# Patient Record
Sex: FEMALE | Race: WHITE | NOT HISPANIC OR LATINO | Employment: OTHER | URBAN - NONMETROPOLITAN AREA
[De-identification: names, ages, dates, MRNs, and addresses within clinical notes are randomized per-mention and may not be internally consistent; named-entity substitution may affect disease eponyms.]

---

## 2017-01-02 ENCOUNTER — HOSPITAL ENCOUNTER (OUTPATIENT)
Dept: PHYSICAL THERAPY | Facility: HOSPITAL | Age: 62
Setting detail: THERAPIES SERIES
Discharge: HOME | End: 2017-01-02

## 2017-01-02 DIAGNOSIS — I97.2 POSTMASTECTOMY LYMPHEDEMA SYNDROME: Primary | ICD-10-CM

## 2017-01-02 PROCEDURE — 97140 MANUAL THERAPY 1/> REGIONS: CPT

## 2017-01-02 NOTE — PROGRESS NOTES
Outpatient Physical Therapy Lymphedema Treatment Note  [facility]     Patient Name: Demi Morataya  : 1955  MRN: [id number]  Today's Date: 2017        Visit Date: 2017    Visit Dx:    ICD-10-CM ICD-9-CM   1. 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             Lymphedema       17 1045          Subjective Comments    Subjective Comments She has her sleeve and it fits well.  She has the pump, and someone is coming today to show her how to use it.  She still feels like she has the swelling that accumulates in the Left uppre arm where the sleeve ends as well as the left chest.   -AL      Subjective Pain    Able to rate subjective pain? yes  -AL      Pre-Treatment Pain Level 0  -AL      Post-Treatment Pain Level 0  -AL      Manual Lymphatic Drainage    Manual Lymphatic Drainage initial sequence;opened regional lymph nodes;opened anastamoses;extremity treatment  -AL      Initial Sequence short neck;abdomen;diaphragmatic breathing  -AL      Supraclavicular left;right  -AL      Abdomen superficial;deep  -AL      Diaphragmatic Breathing x 9 with deep abdominals  -AL      Opened Regional Lymph Nodes axillary;inguinal  -AL      Inguinal left  -AL      Opened Anastamoses axillo-inguinal  -AL      Axillo-Inguinal left  -AL      Axillo-Inguinal Comment Spent extra time on the Left chest area and abdomen   -AL      Extremity Treatment MLD to full limb;extremity treatment focus on   left arm and trunk  -AL      MLD to Full Limb Supine and R sidelying  -AL      Compression/Skin Care    Compression/Skin Care compression garment  -AL      Compression Garment Comments assisted with donning L compression sleeve  -AL        User Key  (r) = Recorded By, (t) = Taken By, (c) = Cosigned By    Initials Name Provider Type    AL Divina Robertson PTA Physical Therapy Assistant                              PT Assessment/Plan       17 1045          PT  Assessment    Functional Limitations Decreased safety during functional activities;Performance in leisure activities;Limitation in home management;Limitations in functional capacity and performance  -AL      Impairments Edema;Impaired lymphatic circulation  -AL      Assessment Comments Patient continues to be compliant with complete decongestive therapy.  Her new copmression sleeve fits nicely, and is not pinching anywhere.  She is going to be trained on the home pump today.  -AL      PT Plan    PT Plan Comments Will plan on seeing patient one more visit to make sure the pump is working for her, as well as her copression sleeve is still containing the swelling in the left arm.   -AL        User Key  (r) = Recorded By, (t) = Taken By, (c) = Cosigned By    Initials Name Provider Type    LAWRENCE Robertson PTA Physical Therapy Assistant                     Exercises       01/02/17 1045          Subjective Comments    Subjective Comments She has her sleeve and it fits well.  She has the pump, and someone is coming today to show her how to use it.  She still feels like she has the swelling that accumulates in the Left uppre arm where the sleeve ends as well as the left chest.   -AL      Subjective Pain    Able to rate subjective pain? yes  -AL      Pre-Treatment Pain Level 0  -AL      Post-Treatment Pain Level 0  -AL        User Key  (r) = Recorded By, (t) = Taken By, (c) = Cosigned By    Initials Name Provider Type    LAWRENCE Robertson PTA Physical Therapy Assistant                              PT OP Goals       01/02/17 1045 12/20/16 0900       PT Short Term Goals    STG Date to Achieve 09/13/16  -AL      STG 1 Patient demonstrate proper awareness of What is Lymphedema for improved management, care of symptoms and self-care of condition.   -AL      STG 1 Progress Met  -AL      STG 2 Patient demonstrate decreased net edema of upper extremity >/= 10% for decreased risk of infection.   -AL      STG 2 Progress Ongoing  -AL       STG 2 Progress Comments She is working on wrapping and wearing her new compression sleeve  -AL      STG 3 Patient independent and compliant with self-massage techniques with spouse as needed for improved lymphatic drainage.  -AL      STG 3 Progress Met   also has flexitouch pump  -AL      STG 4 Patient independent and compliant with initial home exercise program focused on deep breathing, range of motion for decreased edema and decreased risk of infection.   -AL      STG 4 Progress Met  -AL      STG 5 Patient independent and compliant with self-wrapping techniques of compression bandages with spouse for self-management of condition.   -AL      STG 5 Progress Met  -AL      Long Term Goals    LTG Date to Achieve 01/19/17  -AL 01/19/17  -HR     LTG 1 Patient independent and compliant with compression garments as indicated for self-management of condition.   -AL Patient independent and compliant with compression garments as indicated for self-management of condition.   -HR     LTG 1 Progress Progressing  -AL Progressing  -HR     LTG 1 Progress Comments The sleeve fits well now, she has not worn the gaunlet much, but she did put it on today.  -AL The sleeve did not fit so had to be re-ordered. She is supposed to pick it up today.   -HR     LTG 2 Patient will receive and be trained on home lymphedema pump as basic sequential pump is not keeping the arm or trunk edema controlled.   -AL Patient will receive and be trained on home lymphedema pump as basic sequential pump is not keeping the arm or trunk edema controlled.   -HR     LTG 2 Progress Ongoing  -AL New  -HR     LTG 2 Progress Comments She is going to be trained on the pump today.  -AL      Time Calculation    PT Goal Re-Cert Due Date 01/19/17  -AL 01/19/17  -HR       User Key  (r) = Recorded By, (t) = Taken By, (c) = Cosigned By    Initials Name Provider Type    LAWRENCE Robertson, PTA Physical Therapy Assistant    HR Kerline Good, PT Physical Therapist                 Therapy Education       01/02/17 1045    Therapy Education    Given Edema management  -AL    Program Reinforced  -AL    How Provided Verbal  -AL    Provided to Patient  -AL    Level of Understanding Verbalized  -AL      User Key  (r) = Recorded By, (t) = Taken By, (c) = Cosigned By    Initials Name Provider Type    LAWRENCE Robertson PTA Physical Therapy Assistant                Time Calculation:   Start Time: 1045  Stop Time: 1157  Time Calculation (min): 72 min  Total Timed Code Minutes- PT: 72 minute(s)     Therapy Charges for Today     Code Description Service Date Service Provider Modifiers Qty    54953655232 HC PT MANUAL THERAPY EA 15 MIN 1/2/2017 Divina Robertson PTA GP 5                    Divina Robertson PTA  1/2/2017

## 2017-01-17 ENCOUNTER — APPOINTMENT (OUTPATIENT)
Dept: PHYSICAL THERAPY | Facility: HOSPITAL | Age: 62
End: 2017-01-17

## 2017-01-18 ENCOUNTER — HOSPITAL ENCOUNTER (OUTPATIENT)
Dept: PHYSICAL THERAPY | Facility: HOSPITAL | Age: 62
Setting detail: THERAPIES SERIES
Discharge: HOME | End: 2017-01-18

## 2017-01-18 DIAGNOSIS — I97.2 POSTMASTECTOMY LYMPHEDEMA SYNDROME: Primary | ICD-10-CM

## 2017-01-18 PROCEDURE — 97140 MANUAL THERAPY 1/> REGIONS: CPT

## 2017-01-18 NOTE — PROGRESS NOTES
Outpatient Physical Therapy Lymphedema Treatment Note  [facility]     Patient Name: Demi Morataya  : 1955  MRN: [id number]  Today's Date: 2017        Visit Date: 2017    Visit Dx:    ICD-10-CM ICD-9-CM   1. 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             Lymphedema       17 0815          Subjective Comments    Subjective Comments She is using the Flexitouch pump every other day for an hour.  She feels like her arm is maintaining in size.  She is having pain under both breast and she is not sure why.  No pain right now.    -AL      Subjective Pain    Able to rate subjective pain? yes  -AL      Pre-Treatment Pain Level 0  -AL      Post-Treatment Pain Level 0  -AL      Lymphedema Measurements    Quick Girth Areas Upper extremities   abdomen 104.8; chest at axillae 114.4;nipple line 122.0  -AL      LUE Volumetric (cm)    Reference Point 0 17.4 distal wrist crease   Web space 18.6  -AL      RUE Volumetric (cm)    Reference Point 0 0  -AL      LUE Volumetric (cm)    5 25 cm  -AL      10 29.5 cm  -AL      15 32.4 cm  -AL      20 34 cm  -AL      25 34.4 cm   elbow  -AL      30 39.1 cm  -AL      35 41 cm  -AL      40 43 cm   axilla  -AL      Manual Lymphatic Drainage    Manual Lymphatic Drainage initial sequence;opened regional lymph nodes;opened anastamoses;extremity treatment  -AL      Initial Sequence short neck;abdomen;diaphragmatic breathing  -AL      Supraclavicular left;right  -AL      Abdomen superficial;deep  -AL      Diaphragmatic Breathing x 9 with deep abdominals  -AL      Opened Regional Lymph Nodes axillary;inguinal  -AL      Inguinal left  -AL      Opened Anastamoses axillo-inguinal  -AL      Axillo-Inguinal left  -AL      Axillo-Inguinal Comment Spent extra time on the Left chest area and abdomen   -AL      Extremity Treatment MLD to full limb;extremity treatment focus on   left arm and trunk  -AL      MLD to  Full Limb Supine and R sidelying  -AL      Compression/Skin Care    Compression/Skin Care compression garment  -AL      Compression Garment Comments assisted with donning L compression sleeve  -AL        User Key  (r) = Recorded By, (t) = Taken By, (c) = Cosigned By    Initials Name Provider Type    LAWRENCE Robertson PTA Physical Therapy Assistant                              PT Assessment/Plan       01/18/17 0815          PT Assessment    Functional Limitations Decreased safety during functional activities;Performance in leisure activities;Limitation in home management;Limitations in functional capacity and performance  -AL      Impairments Edema;Impaired lymphatic circulation  -AL      Assessment Comments Patient is compliant with her complete decongestive therapy.  She has some questions about the Flexitouch, she is going to get in touch with the company.   -AL      PT Plan    PT Plan Comments Will plan to place patient on hold for the next 3 weeks, if we do not hear from this patient will d/c this patient.   -AL        User Key  (r) = Recorded By, (t) = Taken By, (c) = Cosigned By    Initials Name Provider Type    LAWRENCE Robertson PTA Physical Therapy Assistant                     Exercises       01/18/17 0815          Subjective Comments    Subjective Comments She is using the Flexitouch pump every other day for an hour.  She feels like her arm is maintaining in size.  She is having pain under both breast and she is not sure why.  No pain right now.    -AL      Subjective Pain    Able to rate subjective pain? yes  -AL      Pre-Treatment Pain Level 0  -AL      Post-Treatment Pain Level 0  -AL        User Key  (r) = Recorded By, (t) = Taken By, (c) = Cosigned By    Initials Name Provider Type    LAWRENCE Robertson PTA Physical Therapy Assistant                              PT OP Goals       01/18/17 0815          PT Short Term Goals    STG Date to Achieve 09/13/16  -AL      STG 1 Patient demonstrate proper  awareness of What is Lymphedema for improved management, care of symptoms and self-care of condition.   -AL      STG 1 Progress Met  -AL      STG 2 Patient demonstrate decreased net edema of upper extremity >/= 10% for decreased risk of infection.   -AL      STG 2 Progress Ongoing  -AL      STG 2 Progress Comments Fluctuates  -AL      STG 3 Patient independent and compliant with self-massage techniques with spouse as needed for improved lymphatic drainage.  -AL      STG 3 Progress Met   also has flexitouch pump  -AL      STG 4 Patient independent and compliant with initial home exercise program focused on deep breathing, range of motion for decreased edema and decreased risk of infection.   -AL      STG 4 Progress Met  -AL      STG 5 Patient independent and compliant with self-wrapping techniques of compression bandages with spouse for self-management of condition.   -AL      STG 5 Progress Met  -AL      Long Term Goals    LTG Date to Achieve 01/19/17  -AL      LTG 1 Patient independent and compliant with compression garments as indicated for self-management of condition.   -AL      LTG 1 Progress Met  -AL      LTG 2 Patient will receive and be trained on home lymphedema pump as basic sequential pump is not keeping the arm or trunk edema controlled.   -AL      LTG 2 Progress Partially Met  -AL      LTG 2 Progress Comments She is not sure if the pump should give her more pressure in the chest, she is going to call [employer]  -AL      Time Calculation    PT Goal Re-Cert Due Date 01/19/17  -AL        User Key  (r) = Recorded By, (t) = Taken By, (c) = Cosigned By    Initials Name Provider Type    LAWRENCE Robertson PTA Physical Therapy Assistant                Therapy Education       01/18/17 0891    Therapy Education    Given Edema management  -AL    Program Reinforced  -AL    How Provided Verbal  -AL    Provided to Patient  -AL    Level of Understanding Verbalized  -AL      User Key  (r) = Recorded By, (t) =  Taken By, (c) = Cosigned By    Initials Name Provider Type    AL Divina Robertson PTA Physical Therapy Assistant                Time Calculation:   Start Time: 0815  Stop Time: 0930  Time Calculation (min): 75 min  Total Timed Code Minutes- PT: 75 minute(s)     Therapy Charges for Today     Code Description Service Date Service Provider Modifiers Qty    41216460572 HC PT MANUAL THERAPY EA 15 MIN 1/18/2017 Divina Robertson PTA GP 5                    Divina Robertson PTA  1/18/2017

## 2017-01-23 ENCOUNTER — APPOINTMENT (OUTPATIENT)
Dept: PHYSICAL THERAPY | Facility: HOSPITAL | Age: 62
End: 2017-01-23

## 2017-03-20 ENCOUNTER — OFFICE VISIT (OUTPATIENT)
Dept: OBGYN | Age: 62
End: 2017-03-20
Payer: MEDICARE

## 2017-03-20 VITALS
HEIGHT: 64 IN | WEIGHT: 239 LBS | SYSTOLIC BLOOD PRESSURE: 131 MMHG | BODY MASS INDEX: 40.8 KG/M2 | DIASTOLIC BLOOD PRESSURE: 81 MMHG | HEART RATE: 71 BPM

## 2017-03-20 DIAGNOSIS — Z01.419 WELL WOMAN EXAM WITH ROUTINE GYNECOLOGICAL EXAM: Primary | ICD-10-CM

## 2017-03-20 DIAGNOSIS — Z12.72 SCREENING FOR VAGINAL CANCER: ICD-10-CM

## 2017-03-20 PROCEDURE — G0101 CA SCREEN;PELVIC/BREAST EXAM: HCPCS | Performed by: NURSE PRACTITIONER

## 2017-03-20 RX ORDER — ACETAMINOPHEN 160 MG
TABLET,DISINTEGRATING ORAL
COMMUNITY

## 2017-03-20 RX ORDER — LEVOTHYROXINE SODIUM 0.05 MG/1
50 TABLET ORAL DAILY
COMMUNITY
End: 2019-04-02 | Stop reason: CLARIF

## 2017-03-20 RX ORDER — EZETIMIBE 10 MG/1
10 TABLET ORAL DAILY
COMMUNITY

## 2017-03-20 RX ORDER — OMEPRAZOLE 20 MG/1
20 CAPSULE, DELAYED RELEASE ORAL DAILY
COMMUNITY
End: 2018-04-26 | Stop reason: ALTCHOICE

## 2017-03-20 RX ORDER — MAGNESIUM GLUCONATE 27 MG(500)
500 TABLET ORAL 2 TIMES DAILY
COMMUNITY
End: 2019-04-02

## 2017-03-20 ASSESSMENT — ENCOUNTER SYMPTOMS
EYES NEGATIVE: 1
GASTROINTESTINAL NEGATIVE: 1
RESPIRATORY NEGATIVE: 1

## 2017-03-28 ENCOUNTER — TELEPHONE (OUTPATIENT)
Dept: GASTROENTEROLOGY | Age: 62
End: 2017-03-28

## 2017-03-30 ENCOUNTER — TELEPHONE (OUTPATIENT)
Dept: GASTROENTEROLOGY | Age: 62
End: 2017-03-30

## 2017-04-04 ENCOUNTER — TELEPHONE (OUTPATIENT)
Dept: GASTROENTEROLOGY | Age: 62
End: 2017-04-04

## 2017-04-20 ENCOUNTER — ANESTHESIA EVENT (OUTPATIENT)
Dept: OPERATING ROOM | Age: 62
End: 2017-04-20

## 2017-04-26 ENCOUNTER — HOSPITAL ENCOUNTER (OUTPATIENT)
Age: 62
Setting detail: OUTPATIENT SURGERY
Discharge: HOME | End: 2017-04-26
Attending: INTERNAL MEDICINE | Admitting: INTERNAL MEDICINE
Payer: MEDICARE

## 2017-04-26 ENCOUNTER — ANESTHESIA (OUTPATIENT)
Dept: OPERATING ROOM | Age: 62
End: 2017-04-26
Payer: MEDICARE

## 2017-04-26 VITALS
DIASTOLIC BLOOD PRESSURE: 74 MMHG | HEART RATE: 57 BPM | WEIGHT: 215 LBS | TEMPERATURE: 98.2 F | OXYGEN SATURATION: 98 % | HEIGHT: 64 IN | RESPIRATION RATE: 18 BRPM | BODY MASS INDEX: 36.7 KG/M2 | SYSTOLIC BLOOD PRESSURE: 116 MMHG

## 2017-04-26 VITALS — DIASTOLIC BLOOD PRESSURE: 57 MMHG | OXYGEN SATURATION: 97 % | SYSTOLIC BLOOD PRESSURE: 110 MMHG

## 2017-04-26 PROBLEM — Z12.11 SCREENING FOR COLON CANCER: Status: ACTIVE | Noted: 2017-04-26

## 2017-04-26 PROCEDURE — 00810 PR ANESTH,INTESTINE,SCOPE,LOW: CPT | Performed by: NURSE ANESTHETIST, CERTIFIED REGISTERED

## 2017-04-26 PROCEDURE — G8907 PT DOC NO EVENTS ON DISCHARG: HCPCS

## 2017-04-26 PROCEDURE — G8918 PT W/O PREOP ORDER IV AB PRO: HCPCS

## 2017-04-26 PROCEDURE — G0121 COLON CA SCRN NOT HI RSK IND: HCPCS

## 2017-04-26 PROCEDURE — G0121 COLON CA SCRN NOT HI RSK IND: HCPCS | Performed by: INTERNAL MEDICINE

## 2017-04-26 RX ORDER — PROMETHAZINE HYDROCHLORIDE 25 MG/ML
12.5 INJECTION, SOLUTION INTRAMUSCULAR; INTRAVENOUS
Status: DISCONTINUED | OUTPATIENT
Start: 2017-04-26 | End: 2017-04-26 | Stop reason: HOSPADM

## 2017-04-26 RX ORDER — MEPERIDINE HYDROCHLORIDE 25 MG/ML
12.5 INJECTION INTRAMUSCULAR; INTRAVENOUS; SUBCUTANEOUS EVERY 5 MIN PRN
Status: DISCONTINUED | OUTPATIENT
Start: 2017-04-26 | End: 2017-04-26 | Stop reason: HOSPADM

## 2017-04-26 RX ORDER — HYDRALAZINE HYDROCHLORIDE 20 MG/ML
5 INJECTION INTRAMUSCULAR; INTRAVENOUS EVERY 10 MIN PRN
Status: DISCONTINUED | OUTPATIENT
Start: 2017-04-26 | End: 2017-04-26 | Stop reason: HOSPADM

## 2017-04-26 RX ORDER — SODIUM CHLORIDE, SODIUM LACTATE, POTASSIUM CHLORIDE, CALCIUM CHLORIDE 600; 310; 30; 20 MG/100ML; MG/100ML; MG/100ML; MG/100ML
INJECTION, SOLUTION INTRAVENOUS CONTINUOUS
Status: DISCONTINUED | OUTPATIENT
Start: 2017-04-26 | End: 2017-04-26 | Stop reason: HOSPADM

## 2017-04-26 RX ORDER — LABETALOL HYDROCHLORIDE 5 MG/ML
5 INJECTION, SOLUTION INTRAVENOUS EVERY 10 MIN PRN
Status: DISCONTINUED | OUTPATIENT
Start: 2017-04-26 | End: 2017-04-26 | Stop reason: HOSPADM

## 2017-04-26 RX ORDER — PROPOFOL 10 MG/ML
INJECTION, EMULSION INTRAVENOUS PRN
Status: DISCONTINUED | OUTPATIENT
Start: 2017-04-26 | End: 2017-04-26 | Stop reason: SDUPTHER

## 2017-04-26 RX ORDER — ONDANSETRON 2 MG/ML
4 INJECTION INTRAMUSCULAR; INTRAVENOUS
Status: DISCONTINUED | OUTPATIENT
Start: 2017-04-26 | End: 2017-04-26 | Stop reason: HOSPADM

## 2017-04-26 RX ORDER — LIDOCAINE HYDROCHLORIDE 10 MG/ML
1 INJECTION, SOLUTION EPIDURAL; INFILTRATION; INTRACAUDAL; PERINEURAL
Status: DISCONTINUED | OUTPATIENT
Start: 2017-04-26 | End: 2017-04-26 | Stop reason: HOSPADM

## 2017-04-26 RX ORDER — SODIUM CHLORIDE 9 MG/ML
INJECTION, SOLUTION INTRAVENOUS CONTINUOUS
Status: DISCONTINUED | OUTPATIENT
Start: 2017-04-26 | End: 2017-04-26 | Stop reason: HOSPADM

## 2017-04-26 RX ORDER — DIPHENHYDRAMINE HYDROCHLORIDE 50 MG/ML
12.5 INJECTION INTRAMUSCULAR; INTRAVENOUS
Status: DISCONTINUED | OUTPATIENT
Start: 2017-04-26 | End: 2017-04-26 | Stop reason: HOSPADM

## 2017-04-26 RX ADMIN — SODIUM CHLORIDE, SODIUM LACTATE, POTASSIUM CHLORIDE, CALCIUM CHLORIDE: 600; 310; 30; 20 INJECTION, SOLUTION INTRAVENOUS at 07:45

## 2017-04-26 RX ADMIN — PROPOFOL 200 MG: 10 INJECTION, EMULSION INTRAVENOUS at 08:35

## 2017-04-26 RX ADMIN — SODIUM CHLORIDE: 9 INJECTION, SOLUTION INTRAVENOUS at 07:45

## 2017-05-17 ENCOUNTER — DOCUMENTATION (OUTPATIENT)
Dept: PHYSICAL THERAPY | Facility: HOSPITAL | Age: 62
End: 2017-05-17

## 2017-05-17 DIAGNOSIS — I97.2 POSTMASTECTOMY LYMPHEDEMA SYNDROME: Primary | ICD-10-CM

## 2017-06-22 ENCOUNTER — TELEPHONE (OUTPATIENT)
Dept: UROLOGY | Facility: CLINIC | Age: 62
End: 2017-06-22

## 2017-06-22 DIAGNOSIS — N20.0 KIDNEY STONE ON LEFT SIDE: Primary | ICD-10-CM

## 2017-07-13 ENCOUNTER — APPOINTMENT (OUTPATIENT)
Dept: ULTRASOUND IMAGING | Facility: HOSPITAL | Age: 62
End: 2017-07-13

## 2017-07-13 ENCOUNTER — HOSPITAL ENCOUNTER (EMERGENCY)
Facility: HOSPITAL | Age: 62
Discharge: HOME | End: 2017-07-14
Attending: EMERGENCY MEDICINE | Admitting: EMERGENCY MEDICINE

## 2017-07-13 DIAGNOSIS — R10.9 ABDOMINAL PAIN, UNSPECIFIED LOCATION: Primary | ICD-10-CM

## 2017-07-13 LAB
ALBUMIN SERPL-MCNC: 4.3 G/DL (ref 3.5–5)
ALBUMIN/GLOB SERPL: 1.5 G/DL (ref 1.1–2.5)
ALP SERPL-CCNC: 73 U/L (ref 24–120)
ALT SERPL W P-5'-P-CCNC: 43 U/L (ref 0–54)
ANION GAP SERPL CALCULATED.3IONS-SCNC: 9 MMOL/L (ref 4–13)
AST SERPL-CCNC: 30 U/L (ref 7–45)
BASOPHILS # BLD AUTO: 0.03 10*3/MM3 (ref 0–0.2)
BASOPHILS NFR BLD AUTO: 0.4 % (ref 0–2)
BILIRUB SERPL-MCNC: 1.9 MG/DL (ref 0.1–1)
BUN BLD-MCNC: 18 MG/DL (ref 5–21)
BUN/CREAT SERPL: 23.1 (ref 7–25)
CALCIUM SPEC-SCNC: 9.2 MG/DL (ref 8.4–10.4)
CHLORIDE SERPL-SCNC: 103 MMOL/L (ref 98–110)
CO2 SERPL-SCNC: 27 MMOL/L (ref 24–31)
CREAT BLD-MCNC: 0.78 MG/DL (ref 0.5–1.4)
DEPRECATED RDW RBC AUTO: 40.7 FL (ref 40–54)
EOSINOPHIL # BLD AUTO: 0.07 10*3/MM3 (ref 0–0.7)
EOSINOPHIL NFR BLD AUTO: 0.9 % (ref 0–4)
ERYTHROCYTE [DISTWIDTH] IN BLOOD BY AUTOMATED COUNT: 13.4 % (ref 12–15)
GFR SERPL CREATININE-BSD FRML MDRD: 75 ML/MIN/1.73
GLOBULIN UR ELPH-MCNC: 2.8 GM/DL
GLUCOSE BLD-MCNC: 90 MG/DL (ref 70–100)
HCT VFR BLD AUTO: 39.9 % (ref 37–47)
HGB BLD-MCNC: 13.3 G/DL (ref 12–16)
IMM GRANULOCYTES # BLD: 0.01 10*3/MM3 (ref 0–0.03)
IMM GRANULOCYTES NFR BLD: 0.1 % (ref 0–5)
LIPASE SERPL-CCNC: 50 U/L (ref 23–203)
LYMPHOCYTES # BLD AUTO: 2.58 10*3/MM3 (ref 0.72–4.86)
LYMPHOCYTES NFR BLD AUTO: 33.3 % (ref 15–45)
MCH RBC QN AUTO: 27.7 PG (ref 28–32)
MCHC RBC AUTO-ENTMCNC: 33.3 G/DL (ref 33–36)
MCV RBC AUTO: 83 FL (ref 82–98)
MONOCYTES # BLD AUTO: 0.61 10*3/MM3 (ref 0.19–1.3)
MONOCYTES NFR BLD AUTO: 7.9 % (ref 4–12)
NEUTROPHILS # BLD AUTO: 4.44 10*3/MM3 (ref 1.87–8.4)
NEUTROPHILS NFR BLD AUTO: 57.4 % (ref 39–78)
PLATELET # BLD AUTO: 309 10*3/MM3 (ref 130–400)
PMV BLD AUTO: 9.6 FL (ref 6–12)
POTASSIUM BLD-SCNC: 4.5 MMOL/L (ref 3.5–5.3)
PROT SERPL-MCNC: 7.1 G/DL (ref 6.3–8.7)
RBC # BLD AUTO: 4.81 10*6/MM3 (ref 4.2–5.4)
SODIUM BLD-SCNC: 139 MMOL/L (ref 135–145)
WBC NRBC COR # BLD: 7.74 10*3/MM3 (ref 4.8–10.8)

## 2017-07-13 PROCEDURE — 85025 COMPLETE CBC W/AUTO DIFF WBC: CPT | Performed by: EMERGENCY MEDICINE

## 2017-07-13 PROCEDURE — 96360 HYDRATION IV INFUSION INIT: CPT

## 2017-07-13 PROCEDURE — 76700 US EXAM ABDOM COMPLETE: CPT

## 2017-07-13 PROCEDURE — 83690 ASSAY OF LIPASE: CPT | Performed by: EMERGENCY MEDICINE

## 2017-07-13 PROCEDURE — 80053 COMPREHEN METABOLIC PANEL: CPT | Performed by: EMERGENCY MEDICINE

## 2017-07-13 PROCEDURE — 99284 EMERGENCY DEPT VISIT MOD MDM: CPT

## 2017-07-13 RX ORDER — OMEPRAZOLE 20 MG/1
20 CAPSULE, DELAYED RELEASE ORAL DAILY
COMMUNITY

## 2017-07-13 RX ORDER — ERGOCALCIFEROL (VITAMIN D2) 10 MCG
400 TABLET ORAL DAILY
COMMUNITY

## 2017-07-13 RX ORDER — LEVOTHYROXINE SODIUM 0.05 MG/1
50 TABLET ORAL DAILY
COMMUNITY

## 2017-07-13 RX ORDER — ASPIRIN 81 MG/1
81 TABLET, CHEWABLE ORAL DAILY
COMMUNITY
End: 2017-07-31 | Stop reason: HOSPADM

## 2017-07-13 RX ADMIN — SODIUM CHLORIDE 1000 ML: 9 INJECTION, SOLUTION INTRAVENOUS at 20:33

## 2017-07-14 VITALS
HEART RATE: 73 BPM | TEMPERATURE: 98.8 F | HEIGHT: 64 IN | OXYGEN SATURATION: 97 % | DIASTOLIC BLOOD PRESSURE: 69 MMHG | BODY MASS INDEX: 40.26 KG/M2 | WEIGHT: 235.8 LBS | SYSTOLIC BLOOD PRESSURE: 131 MMHG | RESPIRATION RATE: 17 BRPM

## 2017-07-14 NOTE — ED PROVIDER NOTES
Subjective   HPI Comments: Pt c/o intermittent episodes of sharp epigastric pain.  The pain began yesterday.  SHe has not had any N/V with the pain.  The pain is 10/10 when it occurs.  Pt is not currently having any pain.  She has a remote hx of sphincter of phani stenting done 13 yrs a go in [facility].  She has not had an appetite since yesterday when the pain began.  Pt denies any fever.        History provided by:  Patient      Review of Systems   Constitutional: Negative for activity change, fatigue and fever.   HENT: Negative for congestion, ear pain, facial swelling, postnasal drip, rhinorrhea, sinus pressure, sore throat and trouble swallowing.    Eyes: Negative for photophobia and redness.   Respiratory: Negative for chest tightness, shortness of breath and wheezing.    Cardiovascular: Negative for chest pain and palpitations.   Gastrointestinal: Positive for abdominal pain. Negative for abdominal distention, diarrhea, nausea and vomiting.   Genitourinary: Negative for difficulty urinating, dysuria and flank pain.   Musculoskeletal: Negative for back pain and neck pain.   Skin: Negative for color change and wound.   Neurological: Negative for dizziness, speech difficulty, weakness, light-headedness and headaches.   Psychiatric/Behavioral: Negative for agitation, confusion, self-injury and suicidal ideas.       Past Medical History:   Diagnosis Date   • Breast cancer    • Disease of thyroid gland        Allergies   Allergen Reactions   • Azithromycin    • Neomycin-Polymyxin-Gramicidin    • Penicillins    • Sulfa Antibiotics        Past Surgical History:   Procedure Laterality Date   • CHOLECYSTECTOMY     • MASTECTOMY         History reviewed. No pertinent family history.    Social History     Social History   • Marital status:      Spouse name: N/A   • Number of children: N/A   • Years of education: N/A     Social History Main Topics   • Smoking status: Never Smoker   • Smokeless tobacco: None   • Alcohol  use No   • Drug use: No   • Sexual activity: Not Asked     Other Topics Concern   • None     Social History Narrative   • None           Objective   Physical Exam   Constitutional: She is oriented to person, place, and time. She appears well-developed and well-nourished. No distress.   HENT:   Head: Normocephalic and atraumatic.   Mouth/Throat: Oropharynx is clear and moist. No oropharyngeal exudate.   Eyes: EOM are normal. Pupils are equal, round, and reactive to light.   Neck: Normal range of motion. Neck supple. No JVD present.   Cardiovascular: Normal rate, regular rhythm and normal heart sounds.  Exam reveals no friction rub.    No murmur heard.  Pulmonary/Chest: Effort normal and breath sounds normal. No respiratory distress. She has no wheezes. She has no rales.   Abdominal: Soft. Bowel sounds are normal. She exhibits no distension. There is no tenderness. There is no rebound and no guarding.   Neurological: She is alert and oriented to person, place, and time. No cranial nerve deficit.   Skin: Skin is warm and dry. No rash noted.   Psychiatric: She has a normal mood and affect. Her behavior is normal. Judgment and thought content normal.   Nursing note and vitals reviewed.      Procedures         ED Course  ED Course                  MDM  Number of Diagnoses or Management Options  Abdominal pain, unspecified location: new and requires workup  Diagnosis management comments: Patient has not had any abdominal pain while here in the ED.  Lab work and ultrasound has not showed any acute cause for her abdominal pain.  Will have her follow-up with her PCP.  She was told to return to the ED if she has any further issues or new complaints.       Amount and/or Complexity of Data Reviewed  Clinical lab tests: ordered and reviewed  Tests in the radiology section of CPT®: ordered and reviewed  Tests in the medicine section of CPT®: ordered and reviewed  Independent visualization of images, tracings, or specimens:  yes    Risk of Complications, Morbidity, and/or Mortality  Presenting problems: moderate  Diagnostic procedures: moderate  Management options: moderate    Patient Progress  Patient progress: stable      Final diagnoses:   Abdominal pain, unspecified location            Brijesh Bowden MD  07/14/17 0001

## 2017-07-25 ENCOUNTER — OFFICE VISIT (OUTPATIENT)
Dept: UROLOGY | Facility: CLINIC | Age: 62
End: 2017-07-25

## 2017-07-25 ENCOUNTER — HOSPITAL ENCOUNTER (OUTPATIENT)
Dept: ULTRASOUND IMAGING | Facility: HOSPITAL | Age: 62
Discharge: HOME | End: 2017-07-25
Attending: UROLOGY | Admitting: UROLOGY

## 2017-07-25 VITALS
DIASTOLIC BLOOD PRESSURE: 76 MMHG | SYSTOLIC BLOOD PRESSURE: 138 MMHG | HEIGHT: 64 IN | TEMPERATURE: 97.8 F | WEIGHT: 237.8 LBS | BODY MASS INDEX: 40.6 KG/M2

## 2017-07-25 DIAGNOSIS — N20.0 KIDNEY STONE ON LEFT SIDE: ICD-10-CM

## 2017-07-25 DIAGNOSIS — N20.0 KIDNEY STONE ON LEFT SIDE: Primary | ICD-10-CM

## 2017-07-25 LAB
BILIRUB BLD-MCNC: NEGATIVE MG/DL
CLARITY, POC: CLEAR
COLOR UR: YELLOW
GLUCOSE UR STRIP-MCNC: NEGATIVE MG/DL
KETONES UR QL: NEGATIVE
LEUKOCYTE EST, POC: NEGATIVE
NITRITE UR-MCNC: NEGATIVE MG/ML
PH UR: 6.5 [PH] (ref 5–8)
PROT UR STRIP-MCNC: NEGATIVE MG/DL
RBC # UR STRIP: NEGATIVE /UL
SP GR UR: 1.02 (ref 1–1.03)
UROBILINOGEN UR QL: NORMAL

## 2017-07-25 PROCEDURE — 76775 US EXAM ABDO BACK WALL LIM: CPT

## 2017-07-25 PROCEDURE — 81003 URINALYSIS AUTO W/O SCOPE: CPT | Performed by: UROLOGY

## 2017-07-25 PROCEDURE — 99213 OFFICE O/P EST LOW 20 MIN: CPT | Performed by: UROLOGY

## 2017-07-25 NOTE — PROGRESS NOTES
Ms. Morataya is 62 y.o. female    Chief Complaint   Patient presents with   • Nephrolithiasis       History of Present Illness  Recurrent Urolithiasis  Patient presents for recurrent urolithiasis. Severity is best defined as having approximately 0 stone episodes over the last 1 year(s). The most recent stone episode was approximately 1year(s) ago. Anatomically, the patient has experienced left ureteral calculi. Present stone burden is left renal calculus. Current symptoms/signs possible related to urolithiasis include none. Prevention management includes hydration. Prior procedures include none.     The following portions of the patient's history were reviewed and updated as appropriate: allergies, current medications, past family history, past medical history, past social history, past surgical history and problem list.    Review of Systems   Constitutional: Negative for chills and fever.   Gastrointestinal: Negative for abdominal pain, anal bleeding and blood in stool.   Genitourinary: Negative for flank pain, frequency, hematuria and urgency.         Current Outpatient Prescriptions:   •  aspirin 81 MG chewable tablet, Chew 81 mg Daily., Disp: , Rfl:   •  Ezetimibe (ZETIA PO), Take  by mouth., Disp: , Rfl:   •  levothyroxine (SYNTHROID, LEVOTHROID) 50 MCG tablet, Take 50 mcg by mouth Daily., Disp: , Rfl:   •  omeprazole (priLOSEC) 20 MG capsule, Take 20 mg by mouth Daily., Disp: , Rfl:   •  Vitamin D, Cholecalciferol, (CHOLECALCIFEROL) 400 UNITS tablet, Take 400 Units by mouth Daily., Disp: , Rfl:     Past Medical History:   Diagnosis Date   • Breast cancer    • Disease of thyroid gland        Past Surgical History:   Procedure Laterality Date   • CHOLECYSTECTOMY     • MASTECTOMY         Social History     Social History   • Marital status:      Spouse name: N/A   • Number of children: N/A   • Years of education: N/A     Social History Main Topics   • Smoking status: Never Smoker   • Smokeless tobacco: None  "  • Alcohol use No   • Drug use: No   • Sexual activity: Not Asked     Other Topics Concern   • None     Social History Narrative       Family History   Problem Relation Age of Onset   • No Known Problems Father    • No Known Problems Mother        Objective    /76  Temp 97.8 °F (36.6 °C)  Ht 64\" (162.6 cm)  Wt 237 lb 12.8 oz (108 kg)  BMI 40.82 kg/m2    Physical Exam    Admission on 07/13/2017, Discharged on 07/14/2017   Component Date Value Ref Range Status   • Glucose 07/13/2017 90  70 - 100 mg/dL Final   • BUN 07/13/2017 18  5 - 21 mg/dL Final   • Creatinine 07/13/2017 0.78  0.50 - 1.40 mg/dL Final   • Sodium 07/13/2017 139  135 - 145 mmol/L Final   • Potassium 07/13/2017 4.5  3.5 - 5.3 mmol/L Final   • Chloride 07/13/2017 103  98 - 110 mmol/L Final   • CO2 07/13/2017 27.0  24.0 - 31.0 mmol/L Final   • Calcium 07/13/2017 9.2  8.4 - 10.4 mg/dL Final   • Total Protein 07/13/2017 7.1  6.3 - 8.7 g/dL Final   • Albumin 07/13/2017 4.30  3.50 - 5.00 g/dL Final   • ALT (SGPT) 07/13/2017 43  0 - 54 U/L Final   • AST (SGOT) 07/13/2017 30  7 - 45 U/L Final   • Alkaline Phosphatase 07/13/2017 73  24 - 120 U/L Final   • Total Bilirubin 07/13/2017 1.9* 0.1 - 1.0 mg/dL Final   • eGFR Non African Amer 07/13/2017 75  >60 mL/min/1.73 Final   • Globulin 07/13/2017 2.8  gm/dL Final   • A/G Ratio 07/13/2017 1.5  1.1 - 2.5 g/dL Final   • BUN/Creatinine Ratio 07/13/2017 23.1  7.0 - 25.0 Final   • Anion Gap 07/13/2017 9.0  4.0 - 13.0 mmol/L Final   • Lipase 07/13/2017 50  23 - 203 U/L Final   • WBC 07/13/2017 7.74  4.80 - 10.80 10*3/mm3 Final   • RBC 07/13/2017 4.81  4.20 - 5.40 10*6/mm3 Final   • Hemoglobin 07/13/2017 13.3  12.0 - 16.0 g/dL Final   • Hematocrit 07/13/2017 39.9  37.0 - 47.0 % Final   • MCV 07/13/2017 83.0  82.0 - 98.0 fL Final   • MCH 07/13/2017 27.7* 28.0 - 32.0 pg Final   • MCHC 07/13/2017 33.3  33.0 - 36.0 g/dL Final   • RDW 07/13/2017 13.4  12.0 - 15.0 % Final   • RDW-SD 07/13/2017 40.7  40.0 - 54.0 fl " Final   • MPV 07/13/2017 9.6  6.0 - 12.0 fL Final   • Platelets 07/13/2017 309  130 - 400 10*3/mm3 Final   • Neutrophil % 07/13/2017 57.4  39.0 - 78.0 % Final   • Lymphocyte % 07/13/2017 33.3  15.0 - 45.0 % Final   • Monocyte % 07/13/2017 7.9  4.0 - 12.0 % Final   • Eosinophil % 07/13/2017 0.9  0.0 - 4.0 % Final   • Basophil % 07/13/2017 0.4  0.0 - 2.0 % Final   • Immature Grans % 07/13/2017 0.1  0.0 - 5.0 % Final   • Neutrophils, Absolute 07/13/2017 4.44  1.87 - 8.40 10*3/mm3 Final   • Lymphocytes, Absolute 07/13/2017 2.58  0.72 - 4.86 10*3/mm3 Final   • Monocytes, Absolute 07/13/2017 0.61  0.19 - 1.30 10*3/mm3 Final   • Eosinophils, Absolute 07/13/2017 0.07  0.00 - 0.70 10*3/mm3 Final   • Basophils, Absolute 07/13/2017 0.03  0.00 - 0.20 10*3/mm3 Final   • Immature Grans, Absolute 07/13/2017 0.01  0.00 - 0.03 10*3/mm3 Final       Results for orders placed or performed in visit on 07/25/17   POC Urinalysis Dipstick, Automated   Result Value Ref Range    Color Yellow Yellow, Straw, Dark Yellow, Noris    Clarity, UA Clear Clear    Glucose, UA Negative Negative, 1000 mg/dL (3+) mg/dL    Bilirubin Negative Negative    Ketones, UA Negative Negative    Specific Gravity  1.025 1.005 - 1.030    Blood, UA Negative Negative    pH, Urine 6.5 5.0 - 8.0    Protein, POC Negative Negative mg/dL    Urobilinogen, UA Normal Normal    Leukocytes Negative Negative    Nitrite, UA Negative Negative     Assessment and Plan    Demi was seen today for nephrolithiasis.    Diagnoses and all orders for this visit:    Kidney stone on left side  -     POC Urinalysis Dipstick, Automated  -     XR Abdomen KUB; Future  I reviewed her renal ultrasound which shows a possible stone in the left kidney.  This appears to be larger than the stone seen on her renal ultrasound a year ago.  She is interested in treatment of this stone and is most interested in ESWL.  I would a KUB and have her come back and see me this week and if appropriate schedule her  for ESWL next week.    Renal ultrasound independent review    The renal ultrasound is available for me to review.  Treatment recommendations require an independent review.  This film has been reviewed by the radiologist to determine any non urologic abnormalities that are presents.  However, I very closely inspected the kidneys for size, symmetry, contour, parenchymal thickness, perinephric reaction, presence of calcifications, and intrarenal dilation of the collecting system.       The right kidney appears normal on this ultrasound.  The renal parenchymal is norml in thickness.  There are no solid masses or cysts.  There is no hydronephrosis.  There are no stones.      The left kidney appears abnormal on this ultrasound.     No masses or hydronephrosis.  There is a vertical occlusion with some shadowing in the left kidney upper pole consistent with possible stone measuring at 10 mm    The bladder appears normal on thisultrsaound.  The bladder appears normal in thickness.  There no masses or stones seen on this exam.

## 2017-07-26 ENCOUNTER — OFFICE VISIT (OUTPATIENT)
Dept: UROLOGY | Facility: CLINIC | Age: 62
End: 2017-07-26

## 2017-07-26 ENCOUNTER — APPOINTMENT (OUTPATIENT)
Dept: PREADMISSION TESTING | Facility: HOSPITAL | Age: 62
End: 2017-07-26

## 2017-07-26 ENCOUNTER — HOSPITAL ENCOUNTER (OUTPATIENT)
Dept: GENERAL RADIOLOGY | Facility: HOSPITAL | Age: 62
Discharge: HOME | End: 2017-07-26

## 2017-07-26 ENCOUNTER — HOSPITAL ENCOUNTER (OUTPATIENT)
Dept: GENERAL RADIOLOGY | Facility: HOSPITAL | Age: 62
Discharge: HOME | End: 2017-07-26
Attending: UROLOGY | Admitting: UROLOGY

## 2017-07-26 VITALS
HEART RATE: 69 BPM | OXYGEN SATURATION: 98 % | SYSTOLIC BLOOD PRESSURE: 122 MMHG | HEIGHT: 65 IN | DIASTOLIC BLOOD PRESSURE: 68 MMHG | BODY MASS INDEX: 39.65 KG/M2 | WEIGHT: 238 LBS | RESPIRATION RATE: 18 BRPM

## 2017-07-26 VITALS
BODY MASS INDEX: 40.6 KG/M2 | HEIGHT: 64 IN | SYSTOLIC BLOOD PRESSURE: 140 MMHG | DIASTOLIC BLOOD PRESSURE: 90 MMHG | TEMPERATURE: 98.1 F | WEIGHT: 237.8 LBS

## 2017-07-26 DIAGNOSIS — N20.0 KIDNEY STONE ON LEFT SIDE: Primary | ICD-10-CM

## 2017-07-26 DIAGNOSIS — N20.0 KIDNEY STONE ON LEFT SIDE: ICD-10-CM

## 2017-07-26 LAB
ANION GAP SERPL CALCULATED.3IONS-SCNC: 11 MMOL/L (ref 4–13)
BILIRUB BLD-MCNC: NEGATIVE MG/DL
BILIRUB UR QL STRIP: NEGATIVE
BUN BLD-MCNC: 17 MG/DL (ref 5–21)
BUN/CREAT SERPL: 22.1 (ref 7–25)
CALCIUM SPEC-SCNC: 9.1 MG/DL (ref 8.4–10.4)
CHLORIDE SERPL-SCNC: 102 MMOL/L (ref 98–110)
CLARITY UR: CLEAR
CLARITY, POC: CLEAR
CO2 SERPL-SCNC: 26 MMOL/L (ref 24–31)
COLOR UR: YELLOW
COLOR UR: YELLOW
CREAT BLD-MCNC: 0.77 MG/DL (ref 0.5–1.4)
DEPRECATED RDW RBC AUTO: 41.6 FL (ref 40–54)
ERYTHROCYTE [DISTWIDTH] IN BLOOD BY AUTOMATED COUNT: 13.5 % (ref 12–15)
GFR SERPL CREATININE-BSD FRML MDRD: 76 ML/MIN/1.73
GLUCOSE BLD-MCNC: 98 MG/DL (ref 70–100)
GLUCOSE UR STRIP-MCNC: NEGATIVE MG/DL
GLUCOSE UR STRIP-MCNC: NEGATIVE MG/DL
HCT VFR BLD AUTO: 40.3 % (ref 37–47)
HGB BLD-MCNC: 13.2 G/DL (ref 12–16)
HGB UR QL STRIP.AUTO: NEGATIVE
KETONES UR QL STRIP: NEGATIVE
KETONES UR QL: NEGATIVE
LEUKOCYTE EST, POC: NEGATIVE
LEUKOCYTE ESTERASE UR QL STRIP.AUTO: NEGATIVE
MCH RBC QN AUTO: 27.7 PG (ref 28–32)
MCHC RBC AUTO-ENTMCNC: 32.8 G/DL (ref 33–36)
MCV RBC AUTO: 84.7 FL (ref 82–98)
NITRITE UR QL STRIP: NEGATIVE
NITRITE UR-MCNC: NEGATIVE MG/ML
PH UR STRIP.AUTO: 6 [PH] (ref 5–8)
PH UR: 5.5 [PH] (ref 5–8)
PLATELET # BLD AUTO: 320 10*3/MM3 (ref 130–400)
PMV BLD AUTO: 10 FL (ref 6–12)
POTASSIUM BLD-SCNC: 4.6 MMOL/L (ref 3.5–5.3)
PROT UR QL STRIP: NEGATIVE
PROT UR STRIP-MCNC: NEGATIVE MG/DL
RBC # BLD AUTO: 4.76 10*6/MM3 (ref 4.2–5.4)
RBC # UR STRIP: NEGATIVE /UL
SODIUM BLD-SCNC: 139 MMOL/L (ref 135–145)
SP GR UR STRIP: 1.02 (ref 1–1.03)
SP GR UR: 1.02 (ref 1–1.03)
UROBILINOGEN UR QL STRIP: NORMAL
UROBILINOGEN UR QL: NORMAL
WBC NRBC COR # BLD: 8.5 10*3/MM3 (ref 4.8–10.8)

## 2017-07-26 PROCEDURE — 81003 URINALYSIS AUTO W/O SCOPE: CPT | Performed by: UROLOGY

## 2017-07-26 PROCEDURE — 80048 BASIC METABOLIC PNL TOTAL CA: CPT | Performed by: UROLOGY

## 2017-07-26 PROCEDURE — 74000 HC ABDOMEN KUB: CPT

## 2017-07-26 PROCEDURE — 93010 ELECTROCARDIOGRAM REPORT: CPT | Performed by: INTERNAL MEDICINE

## 2017-07-26 PROCEDURE — 99213 OFFICE O/P EST LOW 20 MIN: CPT | Performed by: UROLOGY

## 2017-07-26 PROCEDURE — 93005 ELECTROCARDIOGRAM TRACING: CPT

## 2017-07-26 PROCEDURE — 85027 COMPLETE CBC AUTOMATED: CPT | Performed by: UROLOGY

## 2017-07-26 PROCEDURE — 71010 HC CHEST PA OR AP: CPT

## 2017-07-26 PROCEDURE — 36415 COLL VENOUS BLD VENIPUNCTURE: CPT

## 2017-07-26 RX ORDER — SODIUM CHLORIDE 0.9 % (FLUSH) 0.9 %
1-10 SYRINGE (ML) INJECTION AS NEEDED
Status: CANCELLED | OUTPATIENT
Start: 2017-07-26

## 2017-07-26 NOTE — DISCHARGE INSTRUCTIONS
DAY OF SURGERY INSTRUCTIONS        YOUR SURGEON: DARLENE LAUGHLIN     PROCEDURE: LEFT EXTRACORPOREAL SHOCKWAVE LITHOTRIPSY    DATE OF SURGERY: July 31, 2017     ARRIVAL TIME: AS DIRECTED BY OFFICE    DAY OF SURGERY TAKE ONLY THESE MEDICATIONS: NONE            BEFORE YOU COME TO THE HOSPITAL  (Pre-op instructions)  • Do not eat, drink, smoke or chew gum after midnight the night before surgery.  This also includes no mints.  • Morning of surgery take only the medicines you have been instructed with a sip of water unless otherwise instructed  by your physician.  • Do not shave, wear makeup or dark nail polish.  • Remove all jewelry including rings.  • Leave anything you consider valuable at home.  • Leave your suitcase in the car until after your surgery.  • Bring the following with you if applicable:  o Picture ID and insurance, Medicare or Medicaid cards  o Co-pay/deductible required by insurance (cash, check, credit card)  o Copy of advance directive, living will or power-of- documents if not brought to PAT  o CPAP or BIPAP mask and tubing  o Relaxation aids (MP3 player, book, magazine)  • On the day of surgery check in at registration located at the main entrance of the hospital.       Outpatient Surgery Guidelines, Adult  Outpatient procedures are those for which the person having the procedure is allowed to go home the same day as the procedure. Various procedures are done on an outpatient basis. You should follow some general guidelines if you will be having an outpatient procedure.  LET YOUR HEALTH CARE PROVIDER KNOW ABOUT:  · Any allergies you have.  · All medicines you are taking, including vitamins, herbs, eye drops, creams, and over-the-counter medicines.  · Previous problems you or members of your family have had with the use of anesthetics.  · Any blood disorders you have.  · Previous surgeries you have had.  · Medical conditions you have.  RISKS AND COMPLICATIONS  Your health care provider will  discuss possible risks and complications with you before surgery. Common risks and complications include:    · Problems due to the use of anesthetics.  · Blood loss and replacement (does not apply to minor surgical procedures).  · Temporary increase in pain due to surgery.  · Uncorrected pain or problems that the surgery was meant to correct.  · Infection.  · New damage.  BEFORE THE PROCEDURE  · Ask your health care provider about changing or stopping your regular medicines. You may need to stop taking certain medicines in the days or weeks before the procedure.  · Stop smoking at least 2 weeks before surgery. This lowers your risk for complications during and after surgery. Ask your health care provider for help with this if needed.  · Eat your usual meals and a light supper the day before surgery. Continue fluid intake. Do not drink alcohol.  · Do not eat or drink after midnight the night before your surgery.   · Arrange for someone to take you home and to stay with you for 24 hours after the procedure. Medicine given for your procedure may affect your ability to drive or to care for yourself.  · Call your health care provider's office if you develop an illness or problem that may prevent you from safely having your procedure.  AFTER THE PROCEDURE  After surgery, you will be taken to a recovery area, where your progress will be monitored. If there are no complications, you will be allowed to go home when you are awake, stable, and taking fluids well. You may have numbness around the surgical site. Healing will take some time. You will have tenderness at the surgical site and may have some swelling and bruising. You may also have some nausea.  HOME CARE INSTRUCTIONS  · Do not drive for 24 hours, or as directed by your health care provider. Do not drive while taking prescription pain medicines.  · Do not drink alcohol for 24 hours.  · Do not make important decisions or sign legal documents for 24 hours.  · You may  resume a normal diet and activities as directed.  · Do not lift anything heavier than 10 pounds (4.5 kg) or play contact sports until your health care provider says it is okay.  · Change your bandages (dressings) as directed.  · Only take over-the-counter or prescription medicines as directed by your health care provider.  · Follow up with your health care provider as directed.  SEEK MEDICAL CARE IF:  · You have increased bleeding (more than a small spot) from the surgical site.  · You have redness, swelling, or increasing pain in the wound.  · You see pus coming from the wound.  · You have a fever.  · You notice a bad smell coming from the wound or dressing.  · You feel lightheaded or faint.  · You develop a rash.  · You have trouble breathing.  · You develop allergies.  MAKE SURE YOU:  · Understand these instructions.  · Will watch your condition.  · Will get help right away if you are not doing well or get worse.     This information is not intended to replace advice given to you by your health care provider. Make sure you discuss any questions you have with your health care provider.     Document Released: 09/12/2002 Document Revised: 05/03/2016 Document Reviewed: 05/22/2014  [employer] Interactive Patient Education ©2016 [employer]       Fall Prevention in Hospitals, Adult  As a hospital patient, your condition and the treatments you receive can increase your risk for falls. Some additional risk factors for falls in a hospital include:  · Being in an unfamiliar environment.  · Being on bed rest.  · Your surgery.  · Taking certain medicines.  · Your tubing requirements, such as intravenous (IV) therapy or catheters.  It is important that you learn how to decrease fall risks while at the hospital. Below are important tips that can help prevent falls.  SAFETY TIPS FOR PREVENTING FALLS  Talk about your risk of falling.  · Ask your health care provider why you are at risk for falling. Is it your medicine, illness,  tubing placement, or something else?  · Make a plan with your health care provider to keep you safe from falls.  · Ask your health care provider or pharmacist about side effects of your medicines. Some medicines can make you dizzy or affect your coordination.  Ask for help.  · Ask for help before getting out of bed. You may need to press your call button.  · Ask for assistance in getting safely to the toilet.  · Ask for a walker or cane to be put at your bedside. Ask that most of the side rails on your bed be placed up before your health care provider leaves the room.  · Ask family or friends to sit with you.  · Ask for things that are out of your reach, such as your glasses, hearing aids, telephone, bedside table, or call button.  Follow these tips to avoid falling:  · Stay lying or seated, rather than standing, while waiting for help.  · Wear rubber-soled slippers or shoes whenever you walk in the hospital.  · Avoid quick, sudden movements.  ¨ Change positions slowly.  ¨ Sit on the side of your bed before standing.  ¨ Stand up slowly and wait before you start to walk.  · Let your health care provider know if there is a spill on the floor.  · Pay careful attention to the medical equipment, electrical cords, and tubes around you.  · When you need help, use your call button by your bed or in the bathroom. Wait for one of your health care providers to help you.  · If you feel dizzy or unsure of your footing, return to bed and wait for assistance.  · Avoid being distracted by the TV, telephone, or another person in your room.  · Do not lean or support yourself on rolling objects, such as IV poles or bedside tables.     This information is not intended to replace advice given to you by your health care provider. Make sure you discuss any questions you have with your health care provider.     Document Released: 12/15/2001 Document Revised: 01/08/2016 Document Reviewed: 08/25/2013  [employer] Interactive Patient Education  ©2016 [employer]       Surgical Site Infections FAQs  What is a Surgical Site Infection (SSI)?  A surgical site infection is an infection that occurs after surgery in the part of the body where the surgery took place. Most patients who have surgery do not develop an infection. However, infections develop in about 1 to 3 out of every 100 patients who have surgery.  Some of the common symptoms of a surgical site infection are:  · Redness and pain around the area where you had surgery  · Drainage of cloudy fluid from your surgical wound  · Fever  Can SSIs be treated?  Yes. Most surgical site infections can be treated with antibiotics. The antibiotic given to you depends on the bacteria (germs) causing the infection. Sometimes patients with SSIs also need another surgery to treat the infection.  What are some of the things that hospitals are doing to prevent SSIs?  To prevent SSIs, doctors, nurses, and other healthcare providers:  · Clean their hands and arms up to their elbows with an antiseptic agent just before the surgery.  · Clean their hands with soap and water or an alcohol-based hand rub before and after caring for each patient.  · May remove some of your hair immediately before your surgery using electric clippers if the hair is in the same area where the procedure will occur. They should not shave you with a razor.  · Wear special hair covers, masks, gowns, and gloves during surgery to keep the surgery area clean.  · Give you antibiotics before your surgery starts. In most cases, you should get antibiotics within 60 minutes before the surgery starts and the antibiotics should be stopped within 24 hours after surgery.  · Clean the skin at the site of your surgery with a special soap that kills germs.  What can I do to help prevent SSIs?  Before your surgery:  · Tell your doctor about other medical problems you may have. Health problems such as allergies, diabetes, and obesity could affect your surgery and  your treatment.  · Quit smoking. Patients who smoke get more infections. Talk to your doctor about how you can quit before your surgery.  · Do not shave near where you will have surgery. Shaving with a razor can irritate your skin and make it easier to develop an infection.  At the time of your surgery:  · Speak up if someone tries to shave you with a razor before surgery. Ask why you need to be shaved and talk with your surgeon if you have any concerns.  · Ask if you will get antibiotics before surgery.  After your surgery:  · Make sure that your healthcare providers clean their hands before examining you, either with soap and water or an alcohol-based hand rub.  · If you do not see your providers clean their hands, please ask them to do so.  · Family and friends who visit you should not touch the surgical wound or dressings.  · Family and friends should clean their hands with soap and water or an alcohol-based hand rub before and after visiting you. If you do not see them clean their hands, ask them to clean their hands.  What do I need to do when I go home from the hospital?  · Before you go home, your doctor or nurse should explain everything you need to know about taking care of your wound. Make sure you understand how to care for your wound before you leave the hospital.  · Always clean your hands before and after caring for your wound.  · Before you go home, make sure you know who to contact if you have questions or problems after you get home.  · If you have any symptoms of an infection, such as redness and pain at the surgery site, drainage, or fever, call your doctor immediately.  If you have additional questions, please ask your doctor or nurse.  Developed and co-sponsored by The Society for Healthcare Epidemiology of [place] (SHEA); Infectious Diseases Society of [place] (IDSA); American Hospital Association; Association for Professionals in Infection Control and Epidemiology (APIC); Centers for Disease  Control and Prevention (CDC); and The Joint Commission.     This information is not intended to replace advice given to you by your health care provider. Make sure you discuss any questions you have with your health care provider.     Document Released: 12/23/2014 Document Revised: 01/08/2016 Document Reviewed: 03/02/2016  [employer] Interactive Patient Education ©2016 [employer]     PATIENT/FAMILY/RESPONSIBLE PARTY VERBALIZES UNDERSTANDING OF ABOVE EDUCATION.  COPY OF PAIN SCALE GIVEN AND REVIEWED WITH VERBALIZED UNDERSTANDING.

## 2017-07-26 NOTE — PROGRESS NOTES
Ms. Morataya is 62 y.o. female    Chief Complaint   Patient presents with   • Nephrolithiasis       History of Present Illness  Recurrent Urolithiasis  Patient presents for recurrent urolithiasis. Severity is best defined as having approximately 0 stone episodes over the last 1 year(s). The most recent stone episode was approximately 1year(s) ago. Anatomically, the patient has experienced left ureteral calculi. Present stone burden is left renal calculus. Current symptoms/signs possible related to urolithiasis include none. Prevention management includes hydration. Prior procedures include none.   The following portions of the patient's history were reviewed and updated as appropriate: allergies, current medications, past family history, past medical history, past social history, past surgical history and problem list.    Review of Systems   Constitutional: Negative for chills and fever.   Gastrointestinal: Negative for abdominal pain, anal bleeding and blood in stool.   Genitourinary: Negative for difficulty urinating, flank pain, frequency, hematuria, pelvic pain and urgency.         Current Outpatient Prescriptions:   •  aspirin 81 MG chewable tablet, Chew 81 mg Daily., Disp: , Rfl:   •  Ezetimibe (ZETIA PO), Take 10 mg by mouth Daily., Disp: , Rfl:   •  levothyroxine (SYNTHROID, LEVOTHROID) 50 MCG tablet, Take 50 mcg by mouth Daily., Disp: , Rfl:   •  omeprazole (priLOSEC) 20 MG capsule, Take 20 mg by mouth Daily., Disp: , Rfl:   •  Vitamin D, Cholecalciferol, (CHOLECALCIFEROL) 400 UNITS tablet, Take 400 Units by mouth Daily., Disp: , Rfl:     Past Medical History:   Diagnosis Date   • Acid reflux    • Breast cancer    • Disease of thyroid gland    • High cholesterol    • Kidney stone    • PONV (postoperative nausea and vomiting)        Past Surgical History:   Procedure Laterality Date   • BONE CYST EXCISION Left 1981    CYST REMOVED FROM LEFT RING FINGER, GRAFT TAKEN FROM RIGHT HIP   • BREAST RECONSTRUCTION  "Bilateral 2010   • CHOLECYSTECTOMY  2005   • HYSTERECTOMY  2001   • MASTECTOMY Bilateral 2008   • OTHER SURGICAL HISTORY  2005    SPHINCTER OF ODDI REPAIR        Social History     Social History   • Marital status:      Spouse name: N/A   • Number of children: N/A   • Years of education: N/A     Social History Main Topics   • Smoking status: Never Smoker   • Smokeless tobacco: Never Used   • Alcohol use No   • Drug use: No   • Sexual activity: Not Asked     Other Topics Concern   • None     Social History Narrative       Family History   Problem Relation Age of Onset   • No Known Problems Father    • No Known Problems Mother        Objective    /90  Temp 98.1 °F (36.7 °C)  Ht 64\" (162.6 cm)  Wt 237 lb 12.8 oz (108 kg)  BMI 40.82 kg/m2    Physical Exam   Constitutional: She is oriented to person, place, and time. She appears well-developed and well-nourished. No distress.   Pulmonary/Chest: Effort normal.   Abdominal: Soft. She exhibits no distension and no mass. There is no tenderness. There is no rebound and no guarding. No hernia.   Neurological: She is alert and oriented to person, place, and time.   Skin: Skin is warm and dry. She is not diaphoretic.   Psychiatric: She has a normal mood and affect.   Vitals reviewed.      Office Visit on 07/25/2017   Component Date Value Ref Range Status   • Color 07/25/2017 Yellow  Yellow, Straw, Dark Yellow, Noris Final   • Clarity, UA 07/25/2017 Clear  Clear Final   • Glucose, UA 07/25/2017 Negative  Negative, 1000 mg/dL (3+) mg/dL Final   • Bilirubin 07/25/2017 Negative  Negative Final   • Ketones, UA 07/25/2017 Negative  Negative Final   • Specific Gravity  07/25/2017 1.025  1.005 - 1.030 Final   • Blood, UA 07/25/2017 Negative  Negative Final   • pH, Urine 07/25/2017 6.5  5.0 - 8.0 Final   • Protein, POC 07/25/2017 Negative  Negative mg/dL Final   • Urobilinogen, UA 07/25/2017 Normal  Normal Final   • Leukocytes 07/25/2017 Negative  Negative Final   • " Nitrite, UA 07/25/2017 Negative  Negative Final       Results for orders placed or performed in visit on 07/26/17   POC Urinalysis Dipstick, Automated   Result Value Ref Range    Color Yellow Yellow, Straw, Dark Yellow, Noris    Clarity, UA Clear Clear    Glucose, UA Negative Negative, 1000 mg/dL (3+) mg/dL    Bilirubin Negative Negative    Ketones, UA Negative Negative    Specific Gravity  1.025 1.005 - 1.030    Blood, UA Negative Negative    pH, Urine 5.5 5.0 - 8.0    Protein, POC Negative Negative mg/dL    Urobilinogen, UA Normal Normal    Leukocytes Negative Negative    Nitrite, UA Negative Negative     Assessment and Plan    Demi was seen today for nephrolithiasis.    Diagnoses and all orders for this visit:    Kidney stone on left side  -     POC Urinalysis Dipstick, Automated  -     Case Request; Standing  -     CBC (No diff); Future  -     Basic metabolic panel; Future  -     Urinalysis w/Culture if Indicated; Future  -     ECG 12 Lead; Future  -     XR chest 1 vw; Future  -     sodium chloride 0.9 % flush 1-10 mL; Infuse 1-10 mL into a venous catheter As Needed for Line Care.  -     levoFLOXacin (LEVAQUIN) 500 mg in sodium chloride 0.9 % 150 mL IVPB; Infuse 500 mg into a venous catheter 1 (One) Time.  -     Case Request    Other orders  -     Follow Anesthesia Guidelines / Standing Orders; Future  -     Provide instructions to patient on NPO status  -     Obtain informed consent  -     Follow Anesthesia Guidelines / Standing Orders; Standing  -     Verify NPO Status; Standing  -     ESA hose- To be placed on patient in pre-op; Standing  -     Insert Peripheral IV; Standing  -     Saline Lock & Maintain IV Access; Standing  Left-sided stone visible on KUB.  She is chosen ESWL with risks and benefits as outlined below.    The patient has a left renal calculus.  The radiographs, including size, location, and symptoms are discussed with the patient.  Options for management including ureteroscopic, open, and  percutaneous approaches are explained to the patient.  Ms. Morataya  has chosen to proceed with ESWL due to its less invasive approach combined with its effectiveness.  Understanding of the risks including damage to the kidney (even long term), inability to render stone free, need for ancillary procedures, bleeding, infection, steinstrasse, post op pain and discomfort,  and possible arrhythmia is expressed by the patient.  Given the size and location of the stone, the patient will not need ureteral stent placement.  The patient also understands the spontaneous passage of the stone is an option, or even following the stone size and location with serial imaging.     KUB independent review    A KUB is available for me to review today.  The image is inspected for a bowel gas pattern and the general bone structure of the spine and pelvis. The kidneys are then inspected closely.  Renal outline is noted if identifiable. The kidney, collecting system, and anticipated path of the ureter are examined for calcifications including those in the true pelvis.  This film reveals:    On the right there are no calcificaitons seen in the kidney or the expected course of the ureter. .    On the left there is a single renal stone

## 2017-07-28 ENCOUNTER — APPOINTMENT (OUTPATIENT)
Dept: PREADMISSION TESTING | Facility: HOSPITAL | Age: 62
End: 2017-07-28

## 2017-07-31 ENCOUNTER — ANESTHESIA (OUTPATIENT)
Dept: PERIOP | Facility: HOSPITAL | Age: 62
End: 2017-07-31

## 2017-07-31 ENCOUNTER — ANESTHESIA EVENT (OUTPATIENT)
Dept: PERIOP | Facility: HOSPITAL | Age: 62
End: 2017-07-31

## 2017-07-31 ENCOUNTER — HOSPITAL ENCOUNTER (OUTPATIENT)
Facility: HOSPITAL | Age: 62
Setting detail: HOSPITAL OUTPATIENT SURGERY
Discharge: HOME | End: 2017-07-31
Attending: UROLOGY | Admitting: UROLOGY

## 2017-07-31 VITALS
OXYGEN SATURATION: 97 % | TEMPERATURE: 97.3 F | RESPIRATION RATE: 20 BRPM | HEART RATE: 66 BPM | SYSTOLIC BLOOD PRESSURE: 137 MMHG | DIASTOLIC BLOOD PRESSURE: 80 MMHG

## 2017-07-31 DIAGNOSIS — N20.0 KIDNEY STONE ON LEFT SIDE: ICD-10-CM

## 2017-07-31 LAB
INR PPP: 0.94 (ref 0.91–1.09)
PROTHROMBIN TIME: 12.9 SECONDS (ref 11.9–14.6)

## 2017-07-31 PROCEDURE — 50590 FRAGMENTING OF KIDNEY STONE: CPT | Performed by: UROLOGY

## 2017-07-31 PROCEDURE — 25010000002 METOCLOPRAMIDE PER 10 MG: Performed by: ANESTHESIOLOGY

## 2017-07-31 PROCEDURE — 25010000002 MIDAZOLAM PER 1 MG: Performed by: ANESTHESIOLOGY

## 2017-07-31 PROCEDURE — 25010000003 CEFAZOLIN PER 500 MG: Performed by: UROLOGY

## 2017-07-31 PROCEDURE — 25010000002 FENTANYL CITRATE (PF) 100 MCG/2ML SOLUTION: Performed by: NURSE ANESTHETIST, CERTIFIED REGISTERED

## 2017-07-31 PROCEDURE — 25010000002 PROPOFOL 10 MG/ML EMULSION: Performed by: NURSE ANESTHETIST, CERTIFIED REGISTERED

## 2017-07-31 PROCEDURE — 85610 PROTHROMBIN TIME: CPT | Performed by: UROLOGY

## 2017-07-31 RX ORDER — ONDANSETRON 2 MG/ML
4 INJECTION INTRAMUSCULAR; INTRAVENOUS ONCE AS NEEDED
Status: DISCONTINUED | OUTPATIENT
Start: 2017-07-31 | End: 2017-07-31 | Stop reason: HOSPADM

## 2017-07-31 RX ORDER — METOCLOPRAMIDE HYDROCHLORIDE 5 MG/ML
5 INJECTION INTRAMUSCULAR; INTRAVENOUS
Status: DISCONTINUED | OUTPATIENT
Start: 2017-07-31 | End: 2017-07-31 | Stop reason: HOSPADM

## 2017-07-31 RX ORDER — HYDROCODONE BITARTRATE AND ACETAMINOPHEN 5; 325 MG/1; MG/1
1 TABLET ORAL EVERY 6 HOURS PRN
Qty: 18 TABLET | Refills: 0 | Status: SHIPPED | OUTPATIENT
Start: 2017-07-31 | End: 2017-08-09

## 2017-07-31 RX ORDER — SODIUM CHLORIDE 0.9 % (FLUSH) 0.9 %
1-10 SYRINGE (ML) INJECTION AS NEEDED
Status: DISCONTINUED | OUTPATIENT
Start: 2017-07-31 | End: 2017-07-31 | Stop reason: HOSPADM

## 2017-07-31 RX ORDER — MIDAZOLAM HYDROCHLORIDE 1 MG/ML
2 INJECTION INTRAMUSCULAR; INTRAVENOUS
Status: DISCONTINUED | OUTPATIENT
Start: 2017-07-31 | End: 2017-07-31 | Stop reason: HOSPADM

## 2017-07-31 RX ORDER — HYDRALAZINE HYDROCHLORIDE 20 MG/ML
5 INJECTION INTRAMUSCULAR; INTRAVENOUS
Status: DISCONTINUED | OUTPATIENT
Start: 2017-07-31 | End: 2017-07-31 | Stop reason: HOSPADM

## 2017-07-31 RX ORDER — CEFAZOLIN SODIUM 1 G/3ML
2 INJECTION, POWDER, FOR SOLUTION INTRAMUSCULAR; INTRAVENOUS ONCE
Status: DISCONTINUED | OUTPATIENT
Start: 2017-07-31 | End: 2017-07-31

## 2017-07-31 RX ORDER — FLUMAZENIL 0.1 MG/ML
0.2 INJECTION INTRAVENOUS AS NEEDED
Status: DISCONTINUED | OUTPATIENT
Start: 2017-07-31 | End: 2017-07-31 | Stop reason: HOSPADM

## 2017-07-31 RX ORDER — LEVOFLOXACIN 5 MG/ML
500 INJECTION, SOLUTION INTRAVENOUS ONCE
Status: DISCONTINUED | OUTPATIENT
Start: 2017-07-31 | End: 2017-07-31

## 2017-07-31 RX ORDER — NALOXONE HCL 0.4 MG/ML
0.04 VIAL (ML) INJECTION AS NEEDED
Status: DISCONTINUED | OUTPATIENT
Start: 2017-07-31 | End: 2017-07-31 | Stop reason: HOSPADM

## 2017-07-31 RX ORDER — PROPOFOL 10 MG/ML
VIAL (ML) INTRAVENOUS AS NEEDED
Status: DISCONTINUED | OUTPATIENT
Start: 2017-07-31 | End: 2017-07-31 | Stop reason: SURG

## 2017-07-31 RX ORDER — LIDOCAINE HYDROCHLORIDE 10 MG/ML
0.5 INJECTION, SOLUTION INFILTRATION; PERINEURAL ONCE AS NEEDED
Status: DISCONTINUED | OUTPATIENT
Start: 2017-07-31 | End: 2017-07-31 | Stop reason: CLARIF

## 2017-07-31 RX ORDER — LABETALOL HYDROCHLORIDE 5 MG/ML
5 INJECTION, SOLUTION INTRAVENOUS
Status: DISCONTINUED | OUTPATIENT
Start: 2017-07-31 | End: 2017-07-31 | Stop reason: HOSPADM

## 2017-07-31 RX ORDER — MEPERIDINE HYDROCHLORIDE 25 MG/ML
12.5 INJECTION INTRAMUSCULAR; INTRAVENOUS; SUBCUTANEOUS
Status: DISCONTINUED | OUTPATIENT
Start: 2017-07-31 | End: 2017-07-31 | Stop reason: HOSPADM

## 2017-07-31 RX ORDER — HYDROCODONE BITARTRATE AND ACETAMINOPHEN 5; 325 MG/1; MG/1
1 TABLET ORAL ONCE AS NEEDED
Status: CANCELLED | OUTPATIENT
Start: 2017-07-31 | End: 2017-08-10

## 2017-07-31 RX ORDER — IPRATROPIUM BROMIDE AND ALBUTEROL SULFATE 2.5; .5 MG/3ML; MG/3ML
3 SOLUTION RESPIRATORY (INHALATION) ONCE AS NEEDED
Status: DISCONTINUED | OUTPATIENT
Start: 2017-07-31 | End: 2017-07-31 | Stop reason: HOSPADM

## 2017-07-31 RX ORDER — MIDAZOLAM HYDROCHLORIDE 1 MG/ML
1 INJECTION INTRAMUSCULAR; INTRAVENOUS
Status: DISCONTINUED | OUTPATIENT
Start: 2017-07-31 | End: 2017-07-31 | Stop reason: HOSPADM

## 2017-07-31 RX ORDER — NITROFURANTOIN 25; 75 MG/1; MG/1
100 CAPSULE ORAL 2 TIMES DAILY
Qty: 6 CAPSULE | Refills: 0 | Status: SHIPPED | OUTPATIENT
Start: 2017-07-31 | End: 2017-08-03

## 2017-07-31 RX ORDER — SODIUM CHLORIDE, SODIUM LACTATE, POTASSIUM CHLORIDE, CALCIUM CHLORIDE 600; 310; 30; 20 MG/100ML; MG/100ML; MG/100ML; MG/100ML
100 INJECTION, SOLUTION INTRAVENOUS CONTINUOUS
Status: DISCONTINUED | OUTPATIENT
Start: 2017-07-31 | End: 2017-07-31 | Stop reason: HOSPADM

## 2017-07-31 RX ORDER — SODIUM CHLORIDE, SODIUM LACTATE, POTASSIUM CHLORIDE, CALCIUM CHLORIDE 600; 310; 30; 20 MG/100ML; MG/100ML; MG/100ML; MG/100ML
1000 INJECTION, SOLUTION INTRAVENOUS CONTINUOUS PRN
Status: DISCONTINUED | OUTPATIENT
Start: 2017-07-31 | End: 2017-07-31 | Stop reason: HOSPADM

## 2017-07-31 RX ORDER — ONDANSETRON 2 MG/ML
4 INJECTION INTRAMUSCULAR; INTRAVENOUS AS NEEDED
Status: DISCONTINUED | OUTPATIENT
Start: 2017-07-31 | End: 2017-07-31 | Stop reason: HOSPADM

## 2017-07-31 RX ORDER — LIDOCAINE HYDROCHLORIDE 20 MG/ML
INJECTION, SOLUTION INFILTRATION; PERINEURAL AS NEEDED
Status: DISCONTINUED | OUTPATIENT
Start: 2017-07-31 | End: 2017-07-31 | Stop reason: SURG

## 2017-07-31 RX ADMIN — MIDAZOLAM HYDROCHLORIDE 2 MG: 1 INJECTION, SOLUTION INTRAMUSCULAR; INTRAVENOUS at 14:49

## 2017-07-31 RX ADMIN — LIDOCAINE HYDROCHLORIDE 0.5 ML: 10 INJECTION, SOLUTION EPIDURAL; INFILTRATION; INTRACAUDAL; PERINEURAL at 13:25

## 2017-07-31 RX ADMIN — LIDOCAINE HYDROCHLORIDE 50 MG: 20 INJECTION, SOLUTION INFILTRATION; PERINEURAL at 15:21

## 2017-07-31 RX ADMIN — SODIUM CHLORIDE, POTASSIUM CHLORIDE, SODIUM LACTATE AND CALCIUM CHLORIDE: 600; 310; 30; 20 INJECTION, SOLUTION INTRAVENOUS at 15:21

## 2017-07-31 RX ADMIN — CEFAZOLIN SODIUM 2 G: 2 SOLUTION INTRAVENOUS at 15:25

## 2017-07-31 RX ADMIN — SODIUM CHLORIDE, POTASSIUM CHLORIDE, SODIUM LACTATE AND CALCIUM CHLORIDE 1000 ML: 600; 310; 30; 20 INJECTION, SOLUTION INTRAVENOUS at 13:25

## 2017-07-31 RX ADMIN — PROPOFOL 200 MG: 10 INJECTION, EMULSION INTRAVENOUS at 15:21

## 2017-07-31 RX ADMIN — METOCLOPRAMIDE 20 MG: 5 INJECTION, SOLUTION INTRAMUSCULAR; INTRAVENOUS at 14:47

## 2017-07-31 RX ADMIN — FENTANYL CITRATE 100 MCG: 50 INJECTION, SOLUTION INTRAMUSCULAR; INTRAVENOUS at 16:44

## 2017-08-02 RX ORDER — FENTANYL CITRATE 50 UG/ML
INJECTION, SOLUTION INTRAMUSCULAR; INTRAVENOUS AS NEEDED
Status: DISCONTINUED | OUTPATIENT
Start: 2017-07-31 | End: 2017-08-02 | Stop reason: SURG

## 2017-08-09 ENCOUNTER — HOSPITAL ENCOUNTER (OUTPATIENT)
Dept: GENERAL RADIOLOGY | Facility: HOSPITAL | Age: 62
Discharge: HOME | End: 2017-08-09
Attending: UROLOGY | Admitting: UROLOGY

## 2017-08-09 ENCOUNTER — OFFICE VISIT (OUTPATIENT)
Dept: UROLOGY | Facility: CLINIC | Age: 62
End: 2017-08-09

## 2017-08-09 VITALS — WEIGHT: 237 LBS | BODY MASS INDEX: 40.46 KG/M2 | TEMPERATURE: 97.7 F | HEIGHT: 64 IN

## 2017-08-09 DIAGNOSIS — N20.0 KIDNEY STONE ON LEFT SIDE: ICD-10-CM

## 2017-08-09 DIAGNOSIS — N20.0 KIDNEY STONE ON LEFT SIDE: Primary | ICD-10-CM

## 2017-08-09 LAB
BILIRUB BLD-MCNC: NEGATIVE MG/DL
CLARITY, POC: CLEAR
COLOR UR: YELLOW
GLUCOSE UR STRIP-MCNC: NEGATIVE MG/DL
KETONES UR QL: NEGATIVE
LEUKOCYTE EST, POC: NEGATIVE
NITRITE UR-MCNC: NEGATIVE MG/ML
PH UR: 6 [PH] (ref 5–8)
PROT UR STRIP-MCNC: NEGATIVE MG/DL
RBC # UR STRIP: ABNORMAL /UL
SP GR UR: 1.02 (ref 1–1.03)
UROBILINOGEN UR QL: NORMAL

## 2017-08-09 PROCEDURE — 81003 URINALYSIS AUTO W/O SCOPE: CPT | Performed by: UROLOGY

## 2017-08-09 PROCEDURE — 99024 POSTOP FOLLOW-UP VISIT: CPT | Performed by: UROLOGY

## 2017-08-09 PROCEDURE — 88300 SURGICAL PATH GROSS: CPT | Performed by: UROLOGY

## 2017-08-09 PROCEDURE — 74000 HC ABDOMEN KUB: CPT

## 2017-08-09 PROCEDURE — 82360 CALCULUS ASSAY QUANT: CPT | Performed by: UROLOGY

## 2017-08-09 NOTE — PROGRESS NOTES
Ms. Morataya is 62 y.o. female    Chief Complaint   Patient presents with   • Nephrolithiasis       History of Present Illness  Post-Operative Follow-up  Patient here for post-op follow-up. Patient is 1 week status post Left ESWL.   The patient reports no problems with eating, bowel movements, voiding, or their wound. Pain is controlled without any medications.    The following portions of the patient's history were reviewed and updated as appropriate: allergies, current medications, past family history, past medical history, past social history, past surgical history and problem list.    Review of Systems   Constitutional: Negative for chills and fever.   Gastrointestinal: Negative for abdominal pain, anal bleeding and blood in stool.   Genitourinary: Positive for flank pain. Negative for hematuria.         Current Outpatient Prescriptions:   •  Ezetimibe (ZETIA PO), Take 10 mg by mouth Daily., Disp: , Rfl:   •  levothyroxine (SYNTHROID, LEVOTHROID) 50 MCG tablet, Take 50 mcg by mouth Daily., Disp: , Rfl:   •  omeprazole (priLOSEC) 20 MG capsule, Take 20 mg by mouth Daily., Disp: , Rfl:   •  Vitamin D, Cholecalciferol, (CHOLECALCIFEROL) 400 UNITS tablet, Take 400 Units by mouth Daily., Disp: , Rfl:     Past Medical History:   Diagnosis Date   • Acid reflux    • Breast cancer    • Disease of thyroid gland    • High cholesterol    • Kidney stone    • PONV (postoperative nausea and vomiting)        Past Surgical History:   Procedure Laterality Date   • BONE CYST EXCISION Left 1981    CYST REMOVED FROM LEFT RING FINGER, GRAFT TAKEN FROM RIGHT HIP   • BREAST RECONSTRUCTION Bilateral 2010   • CHOLECYSTECTOMY  2005   • EXTRACORPOREAL SHOCK WAVE LITHOTRIPSY (ESWL) Left 7/31/2017    Procedure: EXTRACORPOREAL SHOCKWAVE LITHOTRIPSY;  Surgeon: Hollis Garcia MD;  Location: [facility];  Service:    • HYSTERECTOMY  2001   • MASTECTOMY Bilateral 2008   • OTHER SURGICAL HISTORY  2005    SPHINCTER OF ODDI REPAIR        Social  "History     Social History   • Marital status:      Spouse name: N/A   • Number of children: N/A   • Years of education: N/A     Social History Main Topics   • Smoking status: Never Smoker   • Smokeless tobacco: Never Used   • Alcohol use No   • Drug use: No   • Sexual activity: Not Asked     Other Topics Concern   • None     Social History Narrative       Family History   Problem Relation Age of Onset   • No Known Problems Father    • No Known Problems Mother        Objective    Temp 97.7 °F (36.5 °C)  Ht 64\" (162.6 cm)  Wt 237 lb (108 kg)  BMI 40.68 kg/m2    Physical Exam    Admission on 07/31/2017, Discharged on 07/31/2017   Component Date Value Ref Range Status   • Protime 07/31/2017 12.9  11.9 - 14.6 Seconds Final   • INR 07/31/2017 0.94  0.91 - 1.09 Final       Results for orders placed or performed in visit on 08/09/17   POC Urinalysis Dipstick, Automated   Result Value Ref Range    Color Yellow Yellow, Straw, Dark Yellow, Noris    Clarity, UA Clear Clear    Glucose, UA Negative Negative, 1000 mg/dL (3+) mg/dL    Bilirubin Negative Negative    Ketones, UA Negative Negative    Specific Gravity  1.025 1.005 - 1.030    Blood, UA Trace (A) Negative    pH, Urine 6.0 5.0 - 8.0    Protein, POC Negative Negative mg/dL    Urobilinogen, UA Normal Normal    Leukocytes Negative Negative    Nitrite, UA Negative Negative     Assessment and Plan    Demi was seen today for nephrolithiasis.    Diagnoses and all orders for this visit:    Kidney stone on left side  -     POC Urinalysis Dipstick, Automated  -     Tissue Pathology Exam    I reviewed her KUB.  No stones.  I will see her back in 1 year with a renal ultrasound prior  "

## 2017-08-22 LAB
LAB AP CASE REPORT: NORMAL
LAB AP CLINICAL INFORMATION: NORMAL
Lab: NORMAL
PATH REPORT.FINAL DX SPEC: NORMAL
PATH REPORT.GROSS SPEC: NORMAL

## 2017-11-17 ENCOUNTER — APPOINTMENT (OUTPATIENT)
Dept: ULTRASOUND IMAGING | Facility: HOSPITAL | Age: 62
End: 2017-11-17

## 2017-11-17 ENCOUNTER — HOSPITAL ENCOUNTER (EMERGENCY)
Facility: HOSPITAL | Age: 62
Discharge: HOME | End: 2017-11-17
Attending: EMERGENCY MEDICINE | Admitting: EMERGENCY MEDICINE

## 2017-11-17 VITALS
RESPIRATION RATE: 12 BRPM | HEART RATE: 74 BPM | SYSTOLIC BLOOD PRESSURE: 126 MMHG | DIASTOLIC BLOOD PRESSURE: 69 MMHG | HEIGHT: 64 IN | BODY MASS INDEX: 39.27 KG/M2 | TEMPERATURE: 97.1 F | WEIGHT: 230 LBS | OXYGEN SATURATION: 100 %

## 2017-11-17 DIAGNOSIS — L03.114 CELLULITIS OF LEFT UPPER EXTREMITY: Primary | ICD-10-CM

## 2017-11-17 DIAGNOSIS — I89.0 LYMPHEDEMA: ICD-10-CM

## 2017-11-17 LAB
ALBUMIN SERPL-MCNC: 4.3 G/DL (ref 3.5–5)
ALBUMIN/GLOB SERPL: 1.4 G/DL (ref 1.1–2.5)
ALP SERPL-CCNC: 82 U/L (ref 24–120)
ALT SERPL W P-5'-P-CCNC: 34 U/L (ref 0–54)
ANION GAP SERPL CALCULATED.3IONS-SCNC: 14 MMOL/L (ref 4–13)
AST SERPL-CCNC: 26 U/L (ref 7–45)
BASOPHILS # BLD AUTO: 0.03 10*3/MM3 (ref 0–0.2)
BASOPHILS NFR BLD AUTO: 0.2 % (ref 0–2)
BILIRUB SERPL-MCNC: 0.9 MG/DL (ref 0.1–1)
BUN BLD-MCNC: 15 MG/DL (ref 5–21)
BUN/CREAT SERPL: 17.4 (ref 7–25)
CALCIUM SPEC-SCNC: 9.2 MG/DL (ref 8.4–10.4)
CHLORIDE SERPL-SCNC: 102 MMOL/L (ref 98–110)
CO2 SERPL-SCNC: 26 MMOL/L (ref 24–31)
CREAT BLD-MCNC: 0.86 MG/DL (ref 0.5–1.4)
D-LACTATE SERPL-SCNC: 1.9 MMOL/L (ref 0.5–2)
DEPRECATED RDW RBC AUTO: 42.2 FL (ref 40–54)
EOSINOPHIL # BLD AUTO: 0.13 10*3/MM3 (ref 0–0.7)
EOSINOPHIL NFR BLD AUTO: 0.8 % (ref 0–4)
ERYTHROCYTE [DISTWIDTH] IN BLOOD BY AUTOMATED COUNT: 13.4 % (ref 12–15)
GFR SERPL CREATININE-BSD FRML MDRD: 67 ML/MIN/1.73
GLOBULIN UR ELPH-MCNC: 3 GM/DL
GLUCOSE BLD-MCNC: 103 MG/DL (ref 70–100)
HCT VFR BLD AUTO: 39.7 % (ref 37–47)
HGB BLD-MCNC: 12.9 G/DL (ref 12–16)
IMM GRANULOCYTES # BLD: 0.04 10*3/MM3 (ref 0–0.03)
IMM GRANULOCYTES NFR BLD: 0.2 % (ref 0–5)
LYMPHOCYTES # BLD AUTO: 1.49 10*3/MM3 (ref 0.72–4.86)
LYMPHOCYTES NFR BLD AUTO: 9 % (ref 15–45)
MCH RBC QN AUTO: 28 PG (ref 28–32)
MCHC RBC AUTO-ENTMCNC: 32.5 G/DL (ref 33–36)
MCV RBC AUTO: 86.1 FL (ref 82–98)
MONOCYTES # BLD AUTO: 1.03 10*3/MM3 (ref 0.19–1.3)
MONOCYTES NFR BLD AUTO: 6.2 % (ref 4–12)
NEUTROPHILS # BLD AUTO: 13.9 10*3/MM3 (ref 1.87–8.4)
NEUTROPHILS NFR BLD AUTO: 83.6 % (ref 39–78)
PLATELET # BLD AUTO: 344 10*3/MM3 (ref 130–400)
PMV BLD AUTO: 9.8 FL (ref 6–12)
POTASSIUM BLD-SCNC: 4.2 MMOL/L (ref 3.5–5.3)
PROT SERPL-MCNC: 7.3 G/DL (ref 6.3–8.7)
RBC # BLD AUTO: 4.61 10*6/MM3 (ref 4.2–5.4)
SODIUM BLD-SCNC: 142 MMOL/L (ref 135–145)
WBC NRBC COR # BLD: 16.62 10*3/MM3 (ref 4.8–10.8)

## 2017-11-17 PROCEDURE — 25010000002 KETOROLAC TROMETHAMINE PER 15 MG: Performed by: EMERGENCY MEDICINE

## 2017-11-17 PROCEDURE — 96365 THER/PROPH/DIAG IV INF INIT: CPT

## 2017-11-17 PROCEDURE — 99284 EMERGENCY DEPT VISIT MOD MDM: CPT

## 2017-11-17 PROCEDURE — 85025 COMPLETE CBC W/AUTO DIFF WBC: CPT | Performed by: EMERGENCY MEDICINE

## 2017-11-17 PROCEDURE — 93971 EXTREMITY STUDY: CPT | Performed by: SURGERY

## 2017-11-17 PROCEDURE — 87040 BLOOD CULTURE FOR BACTERIA: CPT | Performed by: EMERGENCY MEDICINE

## 2017-11-17 PROCEDURE — 83605 ASSAY OF LACTIC ACID: CPT | Performed by: EMERGENCY MEDICINE

## 2017-11-17 PROCEDURE — 93971 EXTREMITY STUDY: CPT

## 2017-11-17 PROCEDURE — 96375 TX/PRO/DX INJ NEW DRUG ADDON: CPT

## 2017-11-17 PROCEDURE — 80053 COMPREHEN METABOLIC PANEL: CPT | Performed by: EMERGENCY MEDICINE

## 2017-11-17 RX ORDER — CLINDAMYCIN PHOSPHATE 600 MG/50ML
600 INJECTION INTRAVENOUS ONCE
Status: COMPLETED | OUTPATIENT
Start: 2017-11-17 | End: 2017-11-17

## 2017-11-17 RX ORDER — CLINDAMYCIN HYDROCHLORIDE 300 MG/1
300 CAPSULE ORAL 4 TIMES DAILY
Qty: 40 CAPSULE | Refills: 0 | Status: SHIPPED | OUTPATIENT
Start: 2017-11-17 | End: 2019-02-20

## 2017-11-17 RX ORDER — SODIUM CHLORIDE 0.9 % (FLUSH) 0.9 %
10 SYRINGE (ML) INJECTION AS NEEDED
Status: DISCONTINUED | OUTPATIENT
Start: 2017-11-17 | End: 2017-11-17 | Stop reason: HOSPADM

## 2017-11-17 RX ORDER — KETOROLAC TROMETHAMINE 30 MG/ML
30 INJECTION, SOLUTION INTRAMUSCULAR; INTRAVENOUS ONCE
Status: COMPLETED | OUTPATIENT
Start: 2017-11-17 | End: 2017-11-17

## 2017-11-17 RX ADMIN — CLINDAMYCIN PHOSPHATE 600 MG: 12 INJECTION, SOLUTION INTRAVENOUS at 08:07

## 2017-11-17 RX ADMIN — KETOROLAC TROMETHAMINE 30 MG: 30 INJECTION, SOLUTION INTRAMUSCULAR at 09:07

## 2017-11-17 NOTE — ED PROVIDER NOTES
[facility]  eMERGENCY dEPARTMENT eNCOUnter      Pt Name: Demi Morataya  MRN: [id number]  Birthdate 1955  Date of evaluation: 11/17/2017      CHIEF COMPLAINT       Chief Complaint   Patient presents with   • Cellulitis       Nurses Notes reviewed and I agree except as noted in the HPI.      HISTORY OF PRESENT ILLNESS    Demi Morataya is a 62 y.o. female who presents   Location/Symptom: With a cellulitis.  Timing/Onset: Patient notices at home about 4 o'clock this morning  Context/Setting: She has a remote history of breast cancer which is quiescent.  She has chronic lymphedema in the left arm.  She has had cellulitis before.  She thinks it may have started from a cuticle on her left thumb.  Quality: She describes a pins and needle sensation  Duration: Constant  Modifying Factors: Touching the arm  Severity: She is not otherwise demonstrating any constitutional symptoms of fever chills nausea vomiting or diaphoresis       REVIEW OF SYSTEMS     Review of Systems   Constitutional: Negative for chills and fever.   HENT: Negative for ear pain, rhinorrhea and sore throat.    Eyes: Negative for pain, discharge and visual disturbance.   Respiratory: Negative for cough and shortness of breath.    Cardiovascular: Negative for chest pain and palpitations.   Gastrointestinal: Negative for abdominal pain, diarrhea, nausea and vomiting.   Genitourinary: Negative for difficulty urinating, dysuria and hematuria.   Musculoskeletal: Negative for gait problem, neck pain and neck stiffness.   Skin: Positive for rash and wound.   Neurological: Negative for dizziness, syncope and headaches.   Psychiatric/Behavioral: Negative for self-injury and suicidal ideas. The patient is not nervous/anxious.          PAST MEDICAL HISTORY     Past Medical History:   Diagnosis Date   • Acid reflux    • Breast cancer    • Disease of thyroid gland    • High cholesterol    • Kidney stone    • PONV (postoperative nausea and vomiting)   "      SURGICAL HISTORY      has a past surgical history that includes Cholecystectomy (2005); Mastectomy (Bilateral, 2008); Breast reconstruction (Bilateral, 2010); Other surgical history (2005); Hysterectomy (2001); Bone cyst excision (Left, 1981); and Extracorporeal shock wave lithotripsy (Left, 7/31/2017).    CURRENT MEDICATIONS        Medication List      START taking these medications          clindamycin 300 MG capsule   Commonly known as:  CLEOCIN   Take 1 capsule by mouth 4 (Four) Times a Day.         CONTINUE taking these medications          levothyroxine 50 MCG tablet   Commonly known as:  SYNTHROID, LEVOTHROID       omeprazole 20 MG capsule   Commonly known as:  priLOSEC       Vitamin D (Cholecalciferol) 400 units tablet   Commonly known as:  CHOLECALCIFEROL       ZETIA PO           ALLERGIES     is allergic to sulfa antibiotics; azithromycin; neomycin-polymyxin-gramicidin; neosporin [neomycin-bacitracin zn-polymyx]; other; and penicillins.    FAMILY HISTORY     indicated that the status of her mother is unknown. She indicated that the status of her father is unknown.  family history includes No Known Problems in her father and mother.    SOCIAL HISTORY      reports that she has never smoked. She has never used smokeless tobacco. She reports that she does not drink alcohol or use illicit drugs.    PHYSICAL EXAM     INITIAL VITALS:  height is 64\" (162.6 cm) and weight is 230 lb (104 kg). Her temporal artery  temperature is 97.1 °F (36.2 °C). Her blood pressure is 138/79 and her pulse is 77. Her respiration is 12 and oxygen saturation is 96%.    Physical Exam   Constitutional: She is oriented to person, place, and time. She appears well-developed and well-nourished.  Non-toxic appearance.   Eyes: Right eye exhibits no discharge. Left eye exhibits no discharge. No scleral icterus.   Neck: Normal range of motion.   Pulmonary/Chest: Effort normal. No stridor. No respiratory distress.   Musculoskeletal: Normal " range of motion. She exhibits no tenderness or deformity.   Neurological: She is alert and oriented to person, place, and time. Coordination normal.   Skin: Skin is warm and dry. No rash (on exposed surfaces) noted.   She has erythema and warmth of the entire left arm which is lymphedematous.  It is no axillary adenopathy.  The erythema does spread up onto the chest wall.   Psychiatric: She has a normal mood and affect. Her behavior is normal. Thought content normal.         DIFFERENTIAL DIAGNOSIS:   In the past she has not required admission.  She states clindamycin has worked well for her.  However it is quite extensive today and extending onto the chest wall so I think at least intravenous clindamycin as indicated    DIAGNOSTIC RESULTS     EKG: All EKG's are interpreted by the Emergency Department Physician who either signs or Co-signs this chart in the absence of a cardiologist.  None    RADIOLOGY: non-plain film images(s) such as CT, Ultrasound and MRI are read by the radiologist.  Plain radiographic images are visualized and preliminarily interpreted by the emergency physician unless otherwise stated below.  Written report is negative on the duplex for DVT         LABS:   Lab Results (last 24 hours)     Procedure Component Value Units Date/Time    CBC & Differential [[id number]] Collected:  11/17/17 0806    Specimen:  Blood Updated:  11/17/17 0819    Narrative:       The following orders were created for panel order CBC & Differential.  Procedure                               Abnormality         Status                     ---------                               -----------         ------                     CBC Auto Differential[[id number]]        Abnormal            Final result                 Please view results for these tests on the individual orders.    Comprehensive Metabolic Panel [[id number]]  (Abnormal) Collected:  11/17/17 0806    Specimen:  Blood Updated:  11/17/17 0827     Glucose 103 (H) mg/dL       "BUN 15 mg/dL      Creatinine 0.86 mg/dL      Sodium 142 mmol/L      Potassium 4.2 mmol/L      Chloride 102 mmol/L      CO2 26.0 mmol/L      Calcium 9.2 mg/dL      Total Protein 7.3 g/dL      Albumin 4.30 g/dL      ALT (SGPT) 34 U/L      AST (SGOT) 26 U/L      Alkaline Phosphatase 82 U/L      Total Bilirubin 0.9 mg/dL      eGFR Non African Amer 67 mL/min/1.73      Globulin 3.0 gm/dL      A/G Ratio 1.4 g/dL      BUN/Creatinine Ratio 17.4     Anion Gap 14.0 (H) mmol/L     Blood Culture - Blood, [[id number]] Collected:  11/17/17 0806    Specimen:  Blood from Arm, Right Updated:  11/17/17 0826    Lactic Acid, Plasma [[id number]]  (Normal) Collected:  11/17/17 0806    Specimen:  Blood Updated:  11/17/17 0826     Lactate 1.9 mmol/L     CBC Auto Differential [[id number]]  (Abnormal) Collected:  11/17/17 0806    Specimen:  Blood Updated:  11/17/17 0819     WBC 16.62 (H) 10*3/mm3      RBC 4.61 10*6/mm3      Hemoglobin 12.9 g/dL      Hematocrit 39.7 %      MCV 86.1 fL      MCH 28.0 pg      MCHC 32.5 (L) g/dL      RDW 13.4 %      RDW-SD 42.2 fl      MPV 9.8 fL      Platelets 344 10*3/mm3      Neutrophil % 83.6 (H) %      Lymphocyte % 9.0 (L) %      Monocyte % 6.2 %      Eosinophil % 0.8 %      Basophil % 0.2 %      Immature Grans % 0.2 %      Neutrophils, Absolute 13.90 (H) 10*3/mm3      Lymphocytes, Absolute 1.49 10*3/mm3      Monocytes, Absolute 1.03 10*3/mm3      Eosinophils, Absolute 0.13 10*3/mm3      Basophils, Absolute 0.03 10*3/mm3      Immature Grans, Absolute 0.04 (H) 10*3/mm3           EMERGENCY DEPARTMENT COURSE:   Vitals:    Vitals:    11/17/17 0730 11/17/17 0731   BP:  138/79   BP Location:  Right arm   Patient Position:  Sitting   Pulse:  77   Resp:  12   Temp: 97.1 °F (36.2 °C)    TempSrc: Temporal Artery     SpO2:  96%   Weight: 230 lb (104 kg)    Height: 64\" (162.6 cm)      Stable  The patient was given the following medications:  Medications   sodium chloride 0.9 % flush 10 mL (not administered)   clindamycin " (CLEOCIN) 600 mg in dextrose 5% 50 mL IVPB (premix) (0 mg Intravenous Stopped 11/17/17 0907)   ketorolac (TORADOL) injection 30 mg (30 mg Intravenous Given 11/17/17 0907)       CRITICAL CARE:  none    CONSULTS:  none    PROCEDURES:  None    FINAL IMPRESSION      1. Cellulitis of left upper extremity    2. Lymphedema          DISPOSITION/PLAN   Discharged      PATIENT REFERRED TO:  Shivam Chew, APRN  [address] [facility] [id number] [phone]    On 11/20/2017      [facility] Emergency Department  [address] [place] [id number] [phone]    If symptoms worsen      DISCHARGE MEDICATIONS:     Medication List      START taking these medications          clindamycin 300 MG capsule   Commonly known as:  CLEOCIN   Take 1 capsule by mouth 4 (Four) Times a Day.         CONTINUE taking these medications          levothyroxine 50 MCG tablet   Commonly known as:  SYNTHROID, LEVOTHROID       omeprazole 20 MG capsule   Commonly known as:  priLOSEC       Vitamin D (Cholecalciferol) 400 units tablet   Commonly known as:  CHOLECALCIFEROL       ZETIA PO             MD Herve Dunlap MD  11/17/17 0919

## 2017-11-17 NOTE — ED NOTES
PATIENT HAS A HX OF LYMPHEDEMA TO THE LEFT ARM AND ALWAYS HAS SOME SWELLING HOWEVER SHE WOKEUP THIS MORNING WITH A SIGNIFICANT SWELLING AND REDNESS THAT EXTENDS FROM HER LEFT HAND INTO HER ENTIRE ARM AND INTO HER LEFT CHEST AND BREAST      Renu Mcguire RN  11/17/17 0756

## 2017-11-19 ENCOUNTER — HOSPITAL ENCOUNTER (EMERGENCY)
Facility: HOSPITAL | Age: 62
Discharge: HOME | End: 2017-11-19
Attending: EMERGENCY MEDICINE | Admitting: EMERGENCY MEDICINE

## 2017-11-19 VITALS
SYSTOLIC BLOOD PRESSURE: 169 MMHG | RESPIRATION RATE: 17 BRPM | BODY MASS INDEX: 38.76 KG/M2 | HEIGHT: 64 IN | OXYGEN SATURATION: 97 % | DIASTOLIC BLOOD PRESSURE: 82 MMHG | WEIGHT: 227 LBS | TEMPERATURE: 98.5 F | HEART RATE: 68 BPM

## 2017-11-19 DIAGNOSIS — I89.0 LYMPHEDEMA OF LEFT UPPER EXTREMITY: Primary | ICD-10-CM

## 2017-11-19 PROCEDURE — 99282 EMERGENCY DEPT VISIT SF MDM: CPT

## 2017-11-19 NOTE — ED PROVIDER NOTES
Subjective   HPI Comments: Patient was seen 11/17 for cellulitis of her left arm read patient was given IV antibiotics and sent home on oral antibiotics.  She returns tonight complaining of increased swelling of her left extremity.  Patient states that she went on a road trip, but may have left her arm down during that trip.  She now has increased edema.  Patient was afraid that the cellulitis was worsening.  She has not been running a fever.  She states the pain that she experienced few days ago has improved.  She became very concerned secondary to the increased edema.  She states that she usually uses a compression stocking, but states that the arm is bigger than what the compression stocking can handle.  She has not attempted to use an Ace bandage like she usually does.  Short obese check to see if the cellulitis was worse.      History provided by:  Patient      Review of Systems   Constitutional: Negative for activity change, fatigue and fever.   HENT: Negative for congestion, ear pain, facial swelling, postnasal drip, rhinorrhea, sinus pressure, sore throat and trouble swallowing.    Eyes: Negative for photophobia and redness.   Respiratory: Negative for chest tightness, shortness of breath and wheezing.    Cardiovascular: Negative for chest pain and palpitations.   Gastrointestinal: Negative for abdominal distention, abdominal pain, diarrhea, nausea and vomiting.   Genitourinary: Negative for difficulty urinating, dysuria and flank pain.   Musculoskeletal: Negative for back pain and neck pain.        Lymphedema of left upper extremity   Skin: Negative for color change and wound.   Neurological: Negative for dizziness, speech difficulty, weakness, light-headedness and headaches.   Psychiatric/Behavioral: Negative for agitation, confusion, self-injury and suicidal ideas.       Past Medical History:   Diagnosis Date   • Acid reflux    • Breast cancer    • Disease of thyroid gland    • High cholesterol    •  Kidney stone    • PONV (postoperative nausea and vomiting)        Allergies   Allergen Reactions   • Sulfa Antibiotics Swelling     TONGUE   • Azithromycin Rash   • Neomycin-Polymyxin-Gramicidin Rash   • Neosporin [Neomycin-Bacitracin Zn-Polymyx] Rash   • Other Other (See Comments)     CIPROMAX--RASH   • Penicillins Rash       Past Surgical History:   Procedure Laterality Date   • BONE CYST EXCISION Left 1981    CYST REMOVED FROM LEFT RING FINGER, GRAFT TAKEN FROM RIGHT HIP   • BREAST RECONSTRUCTION Bilateral 2010   • CHOLECYSTECTOMY  2005   • EXTRACORPOREAL SHOCK WAVE LITHOTRIPSY (ESWL) Left 7/31/2017    Procedure: EXTRACORPOREAL SHOCKWAVE LITHOTRIPSY;  Surgeon: Hollis Garcia MD;  Location: [facility] OR;  Service:    • HYSTERECTOMY  2001   • MASTECTOMY Bilateral 2008   • OTHER SURGICAL HISTORY  2005    SPHINCTER OF ODDI REPAIR        Family History   Problem Relation Age of Onset   • No Known Problems Father    • No Known Problems Mother        Social History     Social History   • Marital status:      Spouse name: N/A   • Number of children: N/A   • Years of education: N/A     Social History Main Topics   • Smoking status: Never Smoker   • Smokeless tobacco: Never Used   • Alcohol use No   • Drug use: No   • Sexual activity: Not Asked     Other Topics Concern   • None     Social History Narrative           Objective   Physical Exam   Constitutional: She is oriented to person, place, and time. She appears well-developed and well-nourished. No distress.   HENT:   Head: Normocephalic and atraumatic.   Mouth/Throat: Oropharynx is clear and moist.   Eyes: Conjunctivae and EOM are normal. Pupils are equal, round, and reactive to light.   Musculoskeletal: Normal range of motion. She exhibits edema. She exhibits no tenderness.        Left upper arm: She exhibits edema.        Arms:  Neurological: She is alert and oriented to person, place, and time. No cranial nerve deficit.   Skin: Skin is warm and dry. No rash  noted. No erythema.   Psychiatric: She has a normal mood and affect. Her behavior is normal. Judgment and thought content normal.   Nursing note and vitals reviewed.      Procedures         ED Course  ED Course                  MDM  Number of Diagnoses or Management Options  Lymphedema of left upper extremity: new and does not require workup  Diagnosis management comments: There is no redness along patient's left upper extremity or to her chest as described in the previous note.  I do not know what the arm size was when she was initially evaluated, but the arm is swollen.  She has lymphedema secondary to breast cancer.  I suggested to try the Ace bandage to decrease the edema to the point where she can get the compression stocking back on.  I do not believe there is a worsening of her cellulitis.  We will have her follow up with her primary care.  She will to return to ED if any further issues or new complaints.    Patient Progress  Patient progress: stable      Final diagnoses:   Lymphedema of left upper extremity            Brijesh Bowden MD  11/19/17 0216

## 2017-11-22 LAB — BACTERIA SPEC AEROBE CULT: NORMAL

## 2018-03-16 ENCOUNTER — OFFICE VISIT (OUTPATIENT)
Dept: GASTROENTEROLOGY | Age: 63
End: 2018-03-16
Payer: MEDICARE

## 2018-03-16 VITALS
OXYGEN SATURATION: 98 % | BODY MASS INDEX: 40.6 KG/M2 | HEART RATE: 92 BPM | WEIGHT: 237.8 LBS | SYSTOLIC BLOOD PRESSURE: 124 MMHG | HEIGHT: 64 IN | DIASTOLIC BLOOD PRESSURE: 74 MMHG

## 2018-03-16 DIAGNOSIS — R10.10 UPPER ABDOMINAL PAIN: ICD-10-CM

## 2018-03-16 DIAGNOSIS — Z98.890 HISTORY OF SPHINCTEROTOMY OF SPHINCTER OF ODDI: Primary | ICD-10-CM

## 2018-03-16 PROCEDURE — 3017F COLORECTAL CA SCREEN DOC REV: CPT | Performed by: NURSE PRACTITIONER

## 2018-03-16 PROCEDURE — 99214 OFFICE O/P EST MOD 30 MIN: CPT | Performed by: NURSE PRACTITIONER

## 2018-03-16 PROCEDURE — 3014F SCREEN MAMMO DOC REV: CPT | Performed by: NURSE PRACTITIONER

## 2018-03-16 PROCEDURE — G8427 DOCREV CUR MEDS BY ELIG CLIN: HCPCS | Performed by: NURSE PRACTITIONER

## 2018-03-16 PROCEDURE — G8482 FLU IMMUNIZE ORDER/ADMIN: HCPCS | Performed by: NURSE PRACTITIONER

## 2018-03-16 PROCEDURE — 1036F TOBACCO NON-USER: CPT | Performed by: NURSE PRACTITIONER

## 2018-03-16 PROCEDURE — G8417 CALC BMI ABV UP PARAM F/U: HCPCS | Performed by: NURSE PRACTITIONER

## 2018-03-16 RX ORDER — LISINOPRIL 5 MG/1
TABLET ORAL
COMMUNITY
Start: 2018-02-27 | End: 2019-04-02

## 2018-03-16 RX ORDER — ESCITALOPRAM OXALATE 10 MG/1
TABLET ORAL
COMMUNITY
Start: 2017-12-20 | End: 2019-07-15

## 2018-03-16 ASSESSMENT — ENCOUNTER SYMPTOMS
BLOOD IN STOOL: 0
ABDOMINAL PAIN: 1
CONSTIPATION: 0
VOICE CHANGE: 0
ABDOMINAL DISTENTION: 0
CHEST TIGHTNESS: 0
BACK PAIN: 0
SORE THROAT: 0
DIARRHEA: 0
SHORTNESS OF BREATH: 0
RECTAL PAIN: 0
COUGH: 0
VOMITING: 0
NAUSEA: 0

## 2018-03-16 NOTE — PROGRESS NOTES
Don Larson is a 63 y.o. female  Primary Care Provider Phil Castro CNP  Referral Source No ref. provider found    Chief Complaint:  Chief Complaint   Patient presents with    Abdominal Pain           HPI     S/P cholecystectomy 2006  Hx of sphincter of Oddi [id number] with ERCP with stent and latter stent removal.    RUQ sharp pain after eating. Described as sharp and stabbing. 5/10. She states that it has been more constant in the last 2 months. It occurs with all foods, and nothing in particular. It has been occurring since July 2017. Nothing improves the pain. Denies nausea or vomiting. She denies heartburn. Denies melena. Denies jaundice. Denies weight loss. She did go to [facility] ED on 7/17 ABD US was unremarkable. Labs were unrevealing. She was later diagnosed with a left kidney stone and has undergone lithotripsy per Dr. Maykel Vazquez in 7/17. LUQ she states that she has a \"twisting pain\". She states that it is not the same pain experienced with her kidney stone. She states that this happens at random times and is unpredictable. Occurs approximately once per month or less. She denies melena. Denies change in bowel habits. She states that it last 15-30 minutes each time. She has had an EGD in the remote past in [address] [place] [address]. Denies NSAID use.       Past Medical History:   Diagnosis Date    Acid reflux     Breast cancer ([address]     Dysplasia of cervix     Hyperlipidemia     Thyroid disease       Past Surgical History:   Procedure Laterality Date    BREAST RECONSTRUCTION  2010    BREAST RECONSTRUCTION  2011    BREAST RECONSTRUCTION  2011    CHOLECYSTECTOMY  2006    COLONOSCOPY  12/2011    Pt will see Dr. Edilma Barrios 2017   [address] [facility]  [id number] with retained ovaries    MASTECTOMY, BILATERAL  2008    OTHER SURGICAL HISTORY  2006    Sphincter ODODDI    [place] COLONOSCOPY FLX DX W/COLLJ SPEC WHEN PFRMD N/A 4/26/2017    Dr Mart-diverticulosis, 5

## 2018-03-16 NOTE — PATIENT INSTRUCTIONS
doctor will tell you which medicines to take or stop before your procedure. You may need to stop taking certain medicines a week or more before the procedure. So talk to your doctor as soon as you can.   ? · If you have an advance directive, let your doctor know. It may include a living will and a durable power of  for health care. Bring a copy to the hospital. If you don't have one, you may want to prepare one. It lets your doctor and loved ones know your health care wishes. Doctors advise that everyone prepare these papers before any type of surgery or procedure. Procedures can be stressful. This information will help you understand what you can expect. And it will help you safely prepare for your procedure. What happens on the day of the procedure? · Follow the instructions exactly about when to stop eating and drinking. If you don't, your procedure may be canceled. If your doctor told you to take your medicines on the day of the procedure, take them with only a sip of water. ? · Take a bath or shower before you come in for your procedure. Do not apply lotions, perfumes, deodorants, or nail polish. ? · Take off all jewelry and piercings. And take out contact lenses, if you wear them. ? At the hospital or surgery center   · Bring a picture ID. ? · The test may take 15 to 30 minutes. ? · The doctor may spray medicine on the back of your throat to numb it. You also will get medicine to prevent pain and to relax you. ? · You will lie on your left side. The doctor will put the scope in your mouth and toward the back of your throat. The doctor will tell you when to swallow. This helps the scope move down your throat. You will be able to breathe normally. The doctor will move the scope down your esophagus into your stomach. The doctor also may look at the duodenum.    ? · If your doctor wants to take a sample of tissue for a biopsy, he or she may use small surgical tools, which are put into the

## 2018-03-21 ENCOUNTER — HOSPITAL ENCOUNTER (OUTPATIENT)
Dept: ULTRASOUND IMAGING | Age: 63
Discharge: HOME | End: 2018-03-21
Payer: MEDICARE

## 2018-03-21 DIAGNOSIS — R10.10 UPPER ABDOMINAL PAIN: ICD-10-CM

## 2018-03-21 DIAGNOSIS — Z98.890 HISTORY OF SPHINCTEROTOMY OF SPHINCTER OF ODDI: ICD-10-CM

## 2018-03-21 PROCEDURE — 76705 ECHO EXAM OF ABDOMEN: CPT

## 2018-03-26 ENCOUNTER — OFFICE VISIT (OUTPATIENT)
Dept: OBGYN | Age: 63
End: 2018-03-26
Payer: MEDICARE

## 2018-03-26 VITALS
DIASTOLIC BLOOD PRESSURE: 83 MMHG | WEIGHT: 238 LBS | BODY MASS INDEX: 40.63 KG/M2 | HEIGHT: 64 IN | SYSTOLIC BLOOD PRESSURE: 135 MMHG

## 2018-03-26 DIAGNOSIS — Z78.0 POSTMENOPAUSAL: Primary | ICD-10-CM

## 2018-03-26 DIAGNOSIS — N95.2 VAGINAL ATROPHY: ICD-10-CM

## 2018-03-26 PROCEDURE — G8482 FLU IMMUNIZE ORDER/ADMIN: HCPCS | Performed by: NURSE PRACTITIONER

## 2018-03-26 PROCEDURE — G8427 DOCREV CUR MEDS BY ELIG CLIN: HCPCS | Performed by: NURSE PRACTITIONER

## 2018-03-26 PROCEDURE — 3014F SCREEN MAMMO DOC REV: CPT | Performed by: NURSE PRACTITIONER

## 2018-03-26 PROCEDURE — 99213 OFFICE O/P EST LOW 20 MIN: CPT | Performed by: NURSE PRACTITIONER

## 2018-03-26 PROCEDURE — G8417 CALC BMI ABV UP PARAM F/U: HCPCS | Performed by: NURSE PRACTITIONER

## 2018-03-26 PROCEDURE — 3017F COLORECTAL CA SCREEN DOC REV: CPT | Performed by: NURSE PRACTITIONER

## 2018-03-26 PROCEDURE — 1036F TOBACCO NON-USER: CPT | Performed by: NURSE PRACTITIONER

## 2018-03-26 ASSESSMENT — ENCOUNTER SYMPTOMS
EYES NEGATIVE: 1
GASTROINTESTINAL NEGATIVE: 1
RESPIRATORY NEGATIVE: 1

## 2018-03-26 NOTE — PROGRESS NOTES
You should begin tests for colon cancer at age 48. You may have one of several tests. Your doctor will tell you how often to have tests based on your age and risk. Risks include whether you already had a precancerous polyp removed from your colon or whether your parents, sisters and brothers, or children have had colon cancer. · Thyroid disease. Talk to your doctor about whether to have your thyroid checked as part of a regular physical exam. Women have an increased chance of a thyroid problem. · Osteoporosis. You should begin tests for bone density at age 72. If you are younger than 72, ask your doctor whether you have factors that may increase your risk for this disease. You may want to have this test before age 72. · Heart attack and stroke risk. At least every 4 to 6 years, you should have your risk for heart attack and stroke assessed. Your doctor uses factors such as your age, blood pressure, cholesterol, and whether you smoke or have diabetes to show what your risk for a heart attack or stroke is over the next 10 years. When should you call for help? Watch closely for changes in your health, and be sure to contact your doctor if you have any problems or symptoms that concern you. Where can you learn more? Go to https://Teads.Smartsy. org and sign in to your [facility] account. Enter V469 in the [facility] box to learn more about \"Well Visit, Women 50 to 72: Care Instructions. \"     If you do not have an account, please click on the \"Sign Up Now\" link. Current as of: May 12, 2017  Content Version: 11.5  © 0936-0608 [employer], Incorporated. Care instructions adapted under license by [facility] [address] If you have questions about a medical condition or this instruction, always ask your healthcare professional. [place] [address] any warranty or liability for your use of this information.

## 2018-04-11 ENCOUNTER — ANESTHESIA (OUTPATIENT)
Dept: OPERATING ROOM | Age: 63
End: 2018-04-11

## 2018-04-11 ENCOUNTER — ANESTHESIA EVENT (OUTPATIENT)
Dept: OPERATING ROOM | Age: 63
End: 2018-04-11

## 2018-04-11 ENCOUNTER — HOSPITAL ENCOUNTER (OUTPATIENT)
Age: 63
Setting detail: SPECIMEN
Discharge: HOME | End: 2018-04-11
Payer: MEDICARE

## 2018-04-11 ENCOUNTER — HOSPITAL ENCOUNTER (OUTPATIENT)
Age: 63
Setting detail: OUTPATIENT SURGERY
Discharge: HOME | End: 2018-04-11
Attending: INTERNAL MEDICINE | Admitting: INTERNAL MEDICINE
Payer: MEDICARE

## 2018-04-11 VITALS
DIASTOLIC BLOOD PRESSURE: 58 MMHG | WEIGHT: 215 LBS | SYSTOLIC BLOOD PRESSURE: 112 MMHG | HEART RATE: 74 BPM | OXYGEN SATURATION: 94 % | RESPIRATION RATE: 16 BRPM | BODY MASS INDEX: 36.7 KG/M2 | HEIGHT: 64 IN | TEMPERATURE: 98.4 F

## 2018-04-11 VITALS — DIASTOLIC BLOOD PRESSURE: 59 MMHG | SYSTOLIC BLOOD PRESSURE: 119 MMHG | OXYGEN SATURATION: 95 %

## 2018-04-11 PROBLEM — R10.12 LUQ ABDOMINAL PAIN: Status: ACTIVE | Noted: 2018-04-11

## 2018-04-11 PROCEDURE — G8918 PT W/O PREOP ORDER IV AB PRO: HCPCS

## 2018-04-11 PROCEDURE — 43239 EGD BIOPSY SINGLE/MULTIPLE: CPT

## 2018-04-11 PROCEDURE — 43239 EGD BIOPSY SINGLE/MULTIPLE: CPT | Performed by: INTERNAL MEDICINE

## 2018-04-11 PROCEDURE — G8907 PT DOC NO EVENTS ON DISCHARG: HCPCS

## 2018-04-11 PROCEDURE — 87077 CULTURE AEROBIC IDENTIFY: CPT

## 2018-04-11 PROCEDURE — 88305 TISSUE EXAM BY PATHOLOGIST: CPT

## 2018-04-11 RX ORDER — ONDANSETRON 2 MG/ML
4 INJECTION INTRAMUSCULAR; INTRAVENOUS
Status: DISCONTINUED | OUTPATIENT
Start: 2018-04-11 | End: 2018-04-11 | Stop reason: HOSPADM

## 2018-04-11 RX ORDER — PROMETHAZINE HYDROCHLORIDE 25 MG/ML
6.25 INJECTION, SOLUTION INTRAMUSCULAR; INTRAVENOUS
Status: DISCONTINUED | OUTPATIENT
Start: 2018-04-11 | End: 2018-04-11 | Stop reason: HOSPADM

## 2018-04-11 RX ORDER — DIPHENHYDRAMINE HYDROCHLORIDE 50 MG/ML
12.5 INJECTION INTRAMUSCULAR; INTRAVENOUS
Status: DISCONTINUED | OUTPATIENT
Start: 2018-04-11 | End: 2018-04-11 | Stop reason: HOSPADM

## 2018-04-11 RX ORDER — SODIUM CHLORIDE 9 MG/ML
INJECTION, SOLUTION INTRAVENOUS CONTINUOUS
Status: DISCONTINUED | OUTPATIENT
Start: 2018-04-11 | End: 2018-04-11 | Stop reason: HOSPADM

## 2018-04-11 RX ORDER — PROPOFOL 10 MG/ML
INJECTION, EMULSION INTRAVENOUS PRN
Status: DISCONTINUED | OUTPATIENT
Start: 2018-04-11 | End: 2018-04-11 | Stop reason: SDUPTHER

## 2018-04-11 RX ORDER — LIDOCAINE HYDROCHLORIDE 10 MG/ML
INJECTION, SOLUTION EPIDURAL; INFILTRATION; INTRACAUDAL; PERINEURAL PRN
Status: DISCONTINUED | OUTPATIENT
Start: 2018-04-11 | End: 2018-04-11 | Stop reason: SDUPTHER

## 2018-04-11 RX ADMIN — PROPOFOL 250 MG: 10 INJECTION, EMULSION INTRAVENOUS at 08:00

## 2018-04-11 RX ADMIN — LIDOCAINE HYDROCHLORIDE 5 ML: 10 INJECTION, SOLUTION EPIDURAL; INFILTRATION; INTRACAUDAL; PERINEURAL at 08:00

## 2018-04-11 RX ADMIN — SODIUM CHLORIDE: 9 INJECTION, SOLUTION INTRAVENOUS at 07:40

## 2018-04-11 ASSESSMENT — PAIN SCALES - GENERAL
PAINLEVEL_OUTOF10: 0
PAINLEVEL_OUTOF10: 0

## 2018-04-12 LAB — CLOTEST: NEGATIVE

## 2018-04-16 ENCOUNTER — TELEPHONE (OUTPATIENT)
Dept: GASTROENTEROLOGY | Age: 63
End: 2018-04-16

## 2018-04-26 ENCOUNTER — OFFICE VISIT (OUTPATIENT)
Dept: GASTROENTEROLOGY | Age: 63
End: 2018-04-26
Payer: MEDICARE

## 2018-04-26 VITALS
DIASTOLIC BLOOD PRESSURE: 74 MMHG | WEIGHT: 236.8 LBS | SYSTOLIC BLOOD PRESSURE: 120 MMHG | HEIGHT: 64 IN | OXYGEN SATURATION: 95 % | HEART RATE: 67 BPM | BODY MASS INDEX: 40.43 KG/M2

## 2018-04-26 DIAGNOSIS — K22.70 BARRETT'S ESOPHAGUS WITHOUT DYSPLASIA: Primary | ICD-10-CM

## 2018-04-26 DIAGNOSIS — R10.12 LUQ ABDOMINAL PAIN: ICD-10-CM

## 2018-04-26 PROCEDURE — 1036F TOBACCO NON-USER: CPT | Performed by: NURSE PRACTITIONER

## 2018-04-26 PROCEDURE — G8417 CALC BMI ABV UP PARAM F/U: HCPCS | Performed by: NURSE PRACTITIONER

## 2018-04-26 PROCEDURE — 99213 OFFICE O/P EST LOW 20 MIN: CPT | Performed by: NURSE PRACTITIONER

## 2018-04-26 PROCEDURE — G8427 DOCREV CUR MEDS BY ELIG CLIN: HCPCS | Performed by: NURSE PRACTITIONER

## 2018-04-26 PROCEDURE — 3017F COLORECTAL CA SCREEN DOC REV: CPT | Performed by: NURSE PRACTITIONER

## 2018-04-26 RX ORDER — DICYCLOMINE HYDROCHLORIDE 10 MG/1
CAPSULE ORAL
COMMUNITY
Start: 2018-04-17 | End: 2018-04-26 | Stop reason: SINTOL

## 2018-04-26 RX ORDER — PANTOPRAZOLE SODIUM 40 MG/1
40 TABLET, DELAYED RELEASE ORAL DAILY
Qty: 90 TABLET | Refills: 3 | Status: SHIPPED | OUTPATIENT
Start: 2018-04-26 | End: 2019-07-15

## 2018-05-01 ASSESSMENT — ENCOUNTER SYMPTOMS
VOICE CHANGE: 0
CONSTIPATION: 0
SHORTNESS OF BREATH: 0
RECTAL PAIN: 0
DIARRHEA: 0
CHOKING: 0
ABDOMINAL DISTENTION: 0
BLOOD IN STOOL: 0
ABDOMINAL PAIN: 1
VOMITING: 0
NAUSEA: 0
COUGH: 1
TROUBLE SWALLOWING: 0

## 2018-05-09 ENCOUNTER — TRANSCRIBE ORDERS (OUTPATIENT)
Dept: PHYSICAL THERAPY | Facility: HOSPITAL | Age: 63
End: 2018-05-09

## 2018-05-09 DIAGNOSIS — I89.0 LYMPHEDEMA: Primary | ICD-10-CM

## 2018-05-15 ENCOUNTER — HOSPITAL ENCOUNTER (OUTPATIENT)
Dept: PHYSICAL THERAPY | Facility: HOSPITAL | Age: 63
Setting detail: THERAPIES SERIES
Discharge: HOME | End: 2018-05-15

## 2018-05-15 DIAGNOSIS — I97.2 POSTMASTECTOMY LYMPHEDEMA SYNDROME: Primary | ICD-10-CM

## 2018-05-15 PROCEDURE — 97140 MANUAL THERAPY 1/> REGIONS: CPT | Performed by: PHYSICAL THERAPIST

## 2018-05-15 PROCEDURE — G8990 OTHER PT/OT CURRENT STATUS: HCPCS | Performed by: PHYSICAL THERAPIST

## 2018-05-15 PROCEDURE — 97161 PT EVAL LOW COMPLEX 20 MIN: CPT | Performed by: PHYSICAL THERAPIST

## 2018-05-15 PROCEDURE — G8991 OTHER PT/OT GOAL STATUS: HCPCS | Performed by: PHYSICAL THERAPIST

## 2018-05-15 NOTE — THERAPY EVALUATION
Physical Therapy Lymphedema Initial Evaluation  [facility]     Patient Name: Demi Morataya  : 1955  MRN: [id number]  Today's Date: 5/15/2018      Visit Date: 05/15/2018    Visit Dx:    ICD-10-CM ICD-9-CM   1. 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       Patient Active Problem List   Diagnosis   • Kidney stone on left side        Past Medical History:   Diagnosis Date   • Acid reflux    • Breast cancer    • Disease of thyroid gland    • High cholesterol    • Kidney stone    • PONV (postoperative nausea and vomiting)         Past Surgical History:   Procedure Laterality Date   • BONE CYST EXCISION Left     CYST REMOVED FROM LEFT RING FINGER, GRAFT TAKEN FROM RIGHT HIP   • BREAST RECONSTRUCTION Bilateral    • CHOLECYSTECTOMY     • EXTRACORPOREAL SHOCK WAVE LITHOTRIPSY (ESWL) Left 2017    Procedure: EXTRACORPOREAL SHOCKWAVE LITHOTRIPSY;  Surgeon: Hollis Garcia MD;  Location: [facility];  Service:    • HYSTERECTOMY     • MASTECTOMY Bilateral    • OTHER SURGICAL HISTORY      SPHINCTER OF ODDI REPAIR        Visit Dx:    ICD-10-CM ICD-9-CM   1. 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 Morataya has had LUE lymphedema since . She had a bout of cellulitis back in 2017 and has been having more trouble controlling the swelling in the arm. She is having pain in the shoulder as well. Her sleeve no longer fits.   -HR      Patient/Caregiver Goals Decrease swelling  -HR      Patient's Rating of General Health Very good  -HR      Hand Dominance right-handed  -HR      Occupation/sports/leisure activities outdoor activities. active with grandchildren  -HR      How has patient tried to help current problem? trying to wrap the arm tighter, self massage, using the  pump  -HR         Pain     Pain Location Shoulder  -HR      Pain at Present 6  -HR      Pain Frequency Constant/continuous  -HR      Pain Description Aching;Burning;Tightness;Sore  -HR      What Performance Factors Make the Current Problem(s) WORSE? raising L arm  -HR         Fall Risk Assessment    Any falls in the past year: No  -HR         Services    Prior Rehab/Home Health Experiences Yes  -HR      When was the prior experience with Rehab/Home Health January 2017  -HR      Where was the prior experience with Rehab/Home Health [facility]  -HR      Are you currently receiving Home Health services No  -HR      Do you plan to receive Home Health services in the near future No  -HR         Daily Activities    Primary Language English  -HR      Are you able to read Yes  -HR      Are you able to write Yes  -HR      How does patient learn best? Listening  -HR      Teaching needs identified Management of Condition  -HR      Barriers to learning None  -HR      Pt Participated in POC and Goals Yes  -HR         Safety    Are you being hurt, hit, or frightened by anyone at home or in your life? No  -HR      Are you being neglected by a caregiver No  -HR        User Key  (r) = Recorded By, (t) = Taken By, (c) = Cosigned By    Initials Name Provider Type    HR Kerline Good, PT DPT Physical Therapist                Lymphedema     Row Name 05/15/18 1400 05/15/18 1300          Subjective Pain    Able to rate subjective pain?  -- yes  -HR     Pre-Treatment Pain Level  -- 6  -HR        Subjective Comments    Subjective Comments  -- Her arm is just out of control and her shoulder is hurting.   -HR        Lymphedema Assessment    Lymphedema Classification  -- LUE:;Trunk:;secondary;stage 2 (Spontaneously Irreversible)  -HR     Lymphedema Cancer Related Sx  -- axillary dissection;radical mastectomy;reconstructive;simple mastectomy  -HR     Lymphedema Surgery Comments  -- 2011  -HR     Chemo Received yes  -HR  --     Radiation  "Therapy Received yes  -HR  --     Infections or Cellulitis? yes  -HR  --     Infection/Cellulitis Treatment multiple bouts of cellulitis. Treated with IV antibiotics and oral antibiotics  -HR  --        Lymphedema Edema Assessment    Ptting Edema Category By grade out of 3  -HR  --     Pitting Edema + 1/3 (1 of 3);+ 2/3   1/3 proximal arm, 2/3 hand   -HR  --     Edema Assessment Comment Hand and distal forearm are pitting. Proximal arm still pitting but not as severe.  -HR  --        Lymphedema Measurements    Measurement Type(s)  -- Volumetric  -HR     Quick Girth Areas  -- Upper extremities  -HR     Volumetric Areas  -- Upper extremities  -HR     Volumetric UE Distance interval (cm)  -- 5  -HR        LUE Quick Girth (cm)    Web space  -- 21.8 cm  -HR     LUE Quick Girth Total  -- 21.8  -HR        RUE Quick Girth (cm)    Web space  -- 19 cm  -HR     RUE Quick Girth Total  -- 19  -HR        LUE Volumetric (cm)    Reference Point 0  -- 19.7  -HR     5  -- 28.8 cm  -HR     10  -- 32.9 cm  -HR     15  -- 36 cm  -HR     20  -- 39 cm  -HR     25  -- 41.6 cm  -HR     30  -- 42 cm  -HR     35  -- 44.2 cm  -HR     40  -- 43 cm  -HR     LUE Volume Calculation- 5cm  -- 4785  -HR        Compression/Skin Care    Compression/Skin Care  -- wrapping location;bandaging  -HR     Wrapping Location  -- upper extremity  -HR     Wrapping Location UE  -- left:;fingers to axilla  -HR     Wrapping Comments  -- 4\" stockinette, finger wraps. rosidal soft for padding 2 rolls. 6cm, 8cm, 10cm, 12cm comprilans used to apply gradient compression to the LUE.  -HR     Bandage Layers  -- cotton liner;padding/fluff layer;short-stretch bandages (comment size/quantity)  -HR     Bandaging Technique  -- circumferential/spiral;figure-eight;strong compression  -HR       User Key  (r) = Recorded By, (t) = Taken By, (c) = Cosigned By    Initials Name Provider Type    HR Kerline Good, PT DPT Physical Therapist                          Therapy " Education  Education Details: discussed options for more compression around the shoulder area.  Given: Edema management  Program: New  How Provided: Verbal, Demonstration  Provided to: Patient  Level of Understanding: Verbalized            Exercises     Row Name 05/15/18 1300             Subjective Comments    Subjective Comments Her arm is just out of control and her shoulder is hurting.   -HR         Subjective Pain    Able to rate subjective pain? yes  -HR      Pre-Treatment Pain Level 6  -HR        User Key  (r) = Recorded By, (t) = Taken By, (c) = Cosigned By    Initials Name Provider Type    HR Kerline Good, PT DPT Physical Therapist                              PT OP Goals     Row Name 05/15/18 1400          PT Short Term Goals    STG Date to Achieve 05/29/18  -HR     STG 1 Patient demonstrate decreased net edema of upper extremity >/= 10% for decreased risk of infection.   -HR     STG 1 Progress New  -HR     STG 2 Patient independent and compliant with self-massage techniques with spouse as needed for improved lymphatic drainage.  -HR     STG 2 Progress New  -HR     STG 3 Patient independent and compliant with initial home exercise program focused on deep breathing, range of motion for decreased edema and decreased risk of infection.   -HR     STG 3 Progress New  -HR     STG 4 Patient independent and compliant with self-wrapping techniques of compression bandages with spouse for self-management of condition.   -HR     STG 4 Progress New  -HR        Long Term Goals    LTG Date to Achieve 06/26/18  -HR     LTG 1 Patient independent and compliant with compression garments as indicated for self-management of condition.   -HR     LTG 1 Progress New  -HR     LTG 2 Patient will have no s/s of celluliitis.   -HR     LTG 2 Progress New  -HR        Time Calculation    PT Goal Re-Cert Due Date 06/14/18  -HR       User Key  (r) = Recorded By, (t) = Taken By, (c) = Cosigned By    Initials Name Provider Type    HR  Kerline Good, PT DPT Physical Therapist                PT Assessment/Plan     Row Name 05/15/18 1500          PT Assessment    Functional Limitations Decreased safety during functional activities;Performance in leisure activities;Limitation in home management;Limitations in functional capacity and performance   high risk of infection  -HR     Impairments Edema;Impaired lymphatic circulation;Pain  -HR     Assessment Comments Mrs. Morataya is returning for treatment of her LUE lymphedema. It has worsened in severity and she is no longer able to fit into her compression sleeves. She has continued to wrap the arm and use her pumps, but is not seeing a decrease. We will see her 2-3X/wk to work on MLD and compression and try to reduce the arm before she gets new garments.   -HR     Please refer to paper survey for additional self-reported information Yes  -HR     Rehab Potential Good  -HR     Patient/caregiver participated in establishment of treatment plan and goals Yes  -HR     Patient would benefit from skilled therapy intervention Yes  -HR        PT Plan    PT Frequency 2x/week;3x/week  -HR     Predicted Duration of Therapy Intervention (OT Eval) 6 weeks  -HR     Planned CPT's? PT THER PROC EA 15 MIN: 74022;PT MANUAL THERAPY EA 15 MIN: 47595  -HR     PT Plan Comments PT to see 2-3X/wk X 6 wks for CDT of LUQ.  -HR       User Key  (r) = Recorded By, (t) = Taken By, (c) = Cosigned By    Initials Name Provider Type    HR Kerline Good, PT DPT Physical Therapist                       Time Calculation:   Start Time: 1310  Stop Time: 1430  Time Calculation (min): 80 min  Total Timed Code Minutes- PT: 20 minute(s)     Therapy Charges for Today     Code Description Service Date Service Provider Modifiers Qty    64936425402 HC PT OTHER PRIME FUNCT CURRENT 5/15/2018 Kerline Good, PT DPT GP, CL 1    56346391117 HC PT OTHER PRIME FUNCT PROJECTED 5/15/2018 Kerline Good, PT DPT GP, CJ 1    [id number]   PT EVAL LOW COMPLEXITY 4 5/15/2018 Kerline Good, PT DPT GP 1    98489870807 HC PT MANUAL THERAPY EA 15 MIN 5/15/2018 Kerline Good, PT DPT GP 1          PT G-Codes  Functional Limitation: Other PT primary  Other PT Primary Current Status (): At least 60 percent but less than 80 percent impaired, limited or restricted  Other PT Primary Goal Status (): At least 20 percent but less than 40 percent impaired, limited or restricted         Kerline Good, PT DPT  5/15/2018

## 2018-05-22 ENCOUNTER — HOSPITAL ENCOUNTER (OUTPATIENT)
Dept: PHYSICAL THERAPY | Facility: HOSPITAL | Age: 63
Setting detail: THERAPIES SERIES
Discharge: HOME | End: 2018-05-22

## 2018-05-22 DIAGNOSIS — I97.2 POSTMASTECTOMY LYMPHEDEMA SYNDROME: Primary | ICD-10-CM

## 2018-05-22 PROCEDURE — 97140 MANUAL THERAPY 1/> REGIONS: CPT

## 2018-05-22 NOTE — THERAPY TREATMENT NOTE
Outpatient Physical Therapy Lymphedema Treatment Note  [facility]     Patient Name: Demi Morataya  : 1955  MRN: [id number]  Today's Date: 2018        Visit Date: 2018    Visit Dx:    ICD-10-CM ICD-9-CM   1. 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       Patient Active Problem List   Diagnosis   • Kidney stone on left side              Lymphedema     Row Name 18 0905             Subjective Pain    Able to rate subjective pain? yes  -AL      Pre-Treatment Pain Level 6  -AL      Subjective Pain Comment Under both arms  -AL         Subjective Comments    Subjective Comments She was able to wear her sleeve after she took the wraps off.  She wore them for two days.  She feels like it went down a lot.   -AL         Lymphedema Measurements    Measurement Type(s) Volumetric  -AL      Quick Girth Areas Upper extremities  -AL      Volumetric Areas Upper extremities  -AL      Volumetric UE Distance interval (cm) 5  -AL         RUE Quick Girth (cm)    Web space 21.3 cm  -AL      RUE Quick Girth Total 21.3  -AL         LUE Volumetric (cm)    Reference Point 0 19.8  -AL      5 27.3 cm  -AL      10 31.9 cm  -AL      15 35.8 cm  -AL      20 37 cm  -AL      25 39 cm  -AL      30 41.7 cm  -AL      35 43.7 cm  -AL      40 45.4 cm  -AL         Manual Lymphatic Drainage    Manual Lymphatic Drainage initial sequence;opened regional lymph nodes;opened anastamoses;extremity treatment  -AL      Initial Sequence short neck;abdomen;diaphragmatic breathing  -AL      Supraclavicular left;right  -AL      Abdomen superficial;deep  -AL      Diaphragmatic Breathing x 9 with deep abdominals  -AL      Opened Regional Lymph Nodes axillary;inguinal  -AL      Inguinal left;right  -AL      Opened Anastamoses axillo-inguinal  -AL      Axillo-Inguinal left;right  -AL      Axillo-Inguinal Comment Worked on truncal edema R and L lateral trunk  -AL      Extremity Treatment MLD to full limb;extremity treatment focus on   left  "arm and trunk  -AL      MLD to Full Limb Supine and sidelying  -AL         Compression/Skin Care    Compression/Skin Care wrapping location;skin care  -AL      Skin Care moisturizing lotion applied  -AL      Wrapping Location upper extremity  -AL      Wrapping Location UE left:;hand to axilla  -AL      Wrapping Comments 4\" stockinettchristina, sosa soft for padding 1 roll. 6cm, 8cm figure 8, 10cm figure 8, 12cm spiral comprilans used to apply gradient compression to the LUE. Also used Coban around trunk  She is to use the chip bags under each arm where the truncal edema is present.  -AL      Bandaging Technique circumferential/spiral  -AL        User Key  (r) = Recorded By, (t) = Taken By, (c) = Cosigned By    Initials Name Provider Type    LAWRENCE Robertson PTA Physical Therapy Assistant                              PT Assessment/Plan     Row Name 05/22/18 0905          PT Assessment    Assessment Comments The left UE has reduced since the last treatment.  As we continue with treatment, will assess if she will need a ready to wear sleeve vs a custom fit sleeve.  Will check the sizes on both the compression sleeves and gloves to see what will work.    -AL        PT Plan    PT Plan Comments Continue with POC.  -AL       User Key  (r) = Recorded By, (t) = Taken By, (c) = Cosigned By    Initials Name Provider Type    LAWRENCE Robertson PTA Physical Therapy Assistant                     Exercises     Row Name 05/22/18 0905             Subjective Comments    Subjective Comments She was able to wear her sleeve after she took the wraps off.  She wore them for two days.  She feels like it went down a lot.   -AL         Subjective Pain    Able to rate subjective pain? yes  -AL      Pre-Treatment Pain Level 6  -AL      Subjective Pain Comment Under both arms  -AL        User Key  (r) = Recorded By, (t) = Taken By, (c) = Cosigned By    Initials Name Provider Type    LAWRENCE Robertson PTA Physical Therapy Assistant    "                           PT OP Goals     Row Name 05/22/18 0905          PT Short Term Goals    STG Date to Achieve 05/29/18  -AL     STG 1 Patient demonstrate decreased net edema of upper extremity >/= 10% for decreased risk of infection.   -AL     STG 1 Progress New  -AL     STG 2 Patient independent and compliant with self-massage techniques with spouse as needed for improved lymphatic drainage.  -AL     STG 2 Progress Ongoing  -AL     STG 2 Progress Comments She is working on the MLD  -AL     STG 3 Patient independent and compliant with initial home exercise program focused on deep breathing, range of motion for decreased edema and decreased risk of infection.   -AL     STG 3 Progress New  -AL     STG 4 Patient independent and compliant with self-wrapping techniques of compression bandages with spouse for self-management of condition.   -AL     STG 4 Progress New  -AL        Long Term Goals    LTG Date to Achieve 06/26/18  -AL     LTG 1 Patient independent and compliant with compression garments as indicated for self-management of condition.   -AL     LTG 1 Progress Ongoing  -AL     LTG 1 Progress Comments discussed compression garments,   -AL     LTG 2 Patient will have no s/s of celluliitis.   -AL     LTG 2 Progress New  -AL        Time Calculation    PT Goal Re-Cert Due Date 06/14/18  -AL       User Key  (r) = Recorded By, (t) = Taken By, (c) = Cosigned By    Initials Name Provider Type    LAWRENCE Robertson PTA Physical Therapy Assistant          Therapy Education  Education Details: She will try using the chip bags for truncal edema   Given: Edema management  Program: New  How Provided: Verbal  Provided to: Patient  Level of Understanding: Verbalized              Time Calculation:   Start Time: 0905  Stop Time: 1025  Time Calculation (min): 80 min  Total Timed Code Minutes- PT: 80 minute(s)     Therapy Charges for Today     Code Description Service Date Service Provider Modifiers Qty    [id number]  PT  MANUAL THERAPY EA 15 MIN 5/22/2018 Divina Robertson, PTA GP 5                    Divina Robertson, PTA  5/22/2018

## 2018-06-04 ENCOUNTER — HOSPITAL ENCOUNTER (OUTPATIENT)
Dept: PHYSICAL THERAPY | Facility: HOSPITAL | Age: 63
Setting detail: THERAPIES SERIES
Discharge: HOME | End: 2018-06-04

## 2018-06-04 DIAGNOSIS — I97.2 POSTMASTECTOMY LYMPHEDEMA SYNDROME: Primary | ICD-10-CM

## 2018-06-04 PROCEDURE — 97140 MANUAL THERAPY 1/> REGIONS: CPT | Performed by: PHYSICAL THERAPIST

## 2018-06-06 ENCOUNTER — HOSPITAL ENCOUNTER (OUTPATIENT)
Dept: PHYSICAL THERAPY | Facility: HOSPITAL | Age: 63
Setting detail: THERAPIES SERIES
Discharge: HOME | End: 2018-06-06

## 2018-06-06 DIAGNOSIS — I97.2 POSTMASTECTOMY LYMPHEDEMA SYNDROME: Primary | ICD-10-CM

## 2018-06-06 PROCEDURE — 97140 MANUAL THERAPY 1/> REGIONS: CPT

## 2018-06-06 NOTE — THERAPY TREATMENT NOTE
Outpatient Physical Therapy Lymphedema Treatment Note   [facility]     Patient Name: Demi Morataya  : 1955  MRN: [id number]  Today's Date: 2018        Visit Date: 2018    Visit Dx:    ICD-10-CM ICD-9-CM   1. 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 yes  -AL --  -AL     Pre-Treatment Pain Level 8  -AL --  -AL     Subjective Pain Comment Left shoulder  -AL --  -AL        Subjective Comments    Subjective Comments She has a redness in the left breast she thinks is from the bandages on the left arm rubbing the left breast.   -AL --  -AL        Lymphedema Measurements    Measurement Type(s) Volumetric  -AL --  -AL     Quick Girth Areas Upper extremities  -AL --  -AL     Volumetric Areas Upper extremities  -AL --  -AL     Volumetric UE Distance interval (cm) 5  -AL --  -AL        LUE Quick Girth (cm)    Web space 20 cm  -AL  --     LUE Quick Girth Total 20  -AL  --        LUE Volumetric (cm)    LUE Volume Calculation- 3cm  -- --  -AL        RUE Volumetric (cm)    RUE Volume Calculation- 3cm  -- --  -AL        LUE Volumetric (cm)    LUE Volume Calculation- 4cm  -- --  -AL        RUE Volumetric (cm)    RUE Volume Calculation- 4cm  -- --  -AL        LUE Volumetric (cm)    Reference Point 0 19.2  -AL --  -AL     5 26.2 cm  -AL --  -AL     10 29.4 cm  -AL --  -AL     15 34.1 cm  -AL --  -AL     20 36.5 cm  -AL --  -AL     25 37.9 cm  -AL --  -AL     30 41.8 cm  -AL --  -AL     35 43.7 cm  -AL --  -AL     40 44.8 cm  -AL --  -AL     LUE Volume Calculation- 5cm 4434.9  -AL --  -AL        RUE Volumetric (cm)    RUE Volume Calculation- 5cm 0  -AL --  -AL        LUE Volumetric (cm)    LUE Volume Calculation- 10cm  -- --  -AL        RUE Volumetric (cm)    RUE Volume Calculation- 10cm  -- --  -AL        Manual  "Lymphatic Drainage    Manual Lymphatic Drainage initial sequence;opened regional lymph nodes;opened anastamoses;extremity treatment  -AL  --     Initial Sequence short neck;abdomen;diaphragmatic breathing  -AL  --     Supraclavicular left;right  -AL  --     Abdomen superficial;deep  -AL  --     Diaphragmatic Breathing x 9 with deep abdominals  -AL  --     Opened Regional Lymph Nodes axillary;inguinal  -AL  --     Inguinal left;right  -AL  --     Opened Anastamoses axillo-inguinal  -AL  --     Axillo-Inguinal left;right  -AL  --     Extremity Treatment MLD to full limb;extremity treatment focus on   left arm and trunk  -AL  --     MLD to Full Limb Supine and sidelying  -AL  --        Compression/Skin Care    Compression/Skin Care wrapping location;skin care  -AL  --     Skin Care moisturizing lotion applied  -AL  --     Wrapping Location upper extremity  -AL  --     Wrapping Location UE left:;hand to axilla  -AL  --     Wrapping Comments 4\" stockinette, rosidal soft for padding 1 roll. 6cm, 8cm figure 8, 10cm figure 8, used to apply gradient compression to the LUE.   -AL  --     Bandage Layers cotton liner;soft foam- 1/4 inch;short-stretch bandages (comment size/quantity)  -AL  --     Bandaging Technique circumferential/spiral  -AL  --       User Key  (r) = Recorded By, (t) = Taken By, (c) = Cosigned By    Initials Name Provider Type    AL Divina Robertson PTA Physical Therapy Assistant                              PT Assessment/Plan     Row Name 06/06/18 1050          PT Assessment    Assessment Comments She does have a red area on the left breast.  If this does not resolve in the next couple of days she is to call her DrFreddy and see if she needs an antibioitc.  She is planning on ordering the shoulder brace today.  Her arm has decreased in size.   -AL        PT Plan    PT Plan Comments Continue with CDT.   -AL       User Key  (r) = Recorded By, (t) = Taken By, (c) = Cosigned By    Initials Name Provider Type    AL " Divina Robertson PTA Physical Therapy Assistant                     Exercises     Row Name 06/06/18 1050 06/06/18 1000          Subjective Comments    Subjective Comments She has a redness in the left breast she thinks is from the bandages on the left arm rubbing the left breast.   -AL --  -AL        Subjective Pain    Able to rate subjective pain? yes  -AL --  -AL     Pre-Treatment Pain Level 8  -AL --  -AL     Subjective Pain Comment Left shoulder  -AL --  -AL       User Key  (r) = Recorded By, (t) = Taken By, (c) = Cosigned By    Initials Name Provider Type    AL Divina RADHA Robertson PTA Physical Therapy Assistant                              PT OP Goals     Row Name 06/06/18 1050          PT Short Term Goals    STG Date to Achieve 05/29/18  -AL     STG 1 Patient demonstrate decreased net edema of upper extremity >/= 10% for decreased risk of infection.   -AL     STG 1 Progress Ongoing  -AL     STG 1 Progress Comments She has had a decrease in size today as compared to last measurements  -AL     STG 2 Patient independent and compliant with self-massage techniques with spouse as needed for improved lymphatic drainage.  -AL     STG 2 Progress Ongoing  -AL     STG 2 Progress Comments She is working on the self MLD  -AL     STG 3 Patient independent and compliant with initial home exercise program focused on deep breathing, range of motion for decreased edema and decreased risk of infection.   -AL     STG 3 Progress Ongoing  -AL     STG 4 Patient independent and compliant with self-wrapping techniques of compression bandages with spouse for self-management of condition.   -AL     STG 4 Progress Ongoing  -AL     STG 4 Progress Comments She wraps her arm at home.   -AL        Long Term Goals    LTG Date to Achieve 06/26/18  -AL     LTG 1 Patient independent and compliant with compression garments as indicated for self-management of condition.   -AL     LTG 1 Progress Ongoing  -AL     LTG 2 Patient will have no s/s of  celluliitis.   -AL     LTG 2 Progress Ongoing  -AL        Time Calculation    PT Goal Re-Cert Due Date 07/04/18  -AL       User Key  (r) = Recorded By, (t) = Taken By, (c) = Cosigned By    Initials Name Provider Type    LAWRENCE Robertson PTA Physical Therapy Assistant          Therapy Education  Education Details: Continue with CDT  Given: Edema management  Program: Reinforced  How Provided: Verbal  Provided to: Patient  Level of Understanding: Verbalized              Time Calculation:   Start Time: 1050  Stop Time: 1152  Time Calculation (min): 62 min  Total Timed Code Minutes- PT: 62 minute(s)     Therapy Charges for Today     Code Description Service Date Service Provider Modifiers Qty    [id number] HC PT MANUAL THERAPY EA 15 MIN 6/6/2018 Divina Robertson PTA GP 4                    Divina Robertson PTA  6/6/2018

## 2018-06-19 ENCOUNTER — HOSPITAL ENCOUNTER (OUTPATIENT)
Dept: PHYSICAL THERAPY | Facility: HOSPITAL | Age: 63
Setting detail: THERAPIES SERIES
Discharge: HOME | End: 2018-06-19

## 2018-06-19 DIAGNOSIS — I97.2 POSTMASTECTOMY LYMPHEDEMA SYNDROME: Primary | ICD-10-CM

## 2018-06-19 PROCEDURE — 97140 MANUAL THERAPY 1/> REGIONS: CPT

## 2018-06-19 NOTE — THERAPY TREATMENT NOTE
Outpatient Physical Therapy Lymphedema Treatment Note  [facility]     Patient Name: Demi Morataya  : 1955  MRN: [id number]  Today's Date: 2018        Visit Date: 2018    Visit Dx:    ICD-10-CM ICD-9-CM   1. 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 yes  -AL      Pre-Treatment Pain Level 6  -AL      Subjective Pain Comment Left shoulder 6/10 with movement, 0/10 with rest.  -AL         Subjective Comments    Subjective Comments Her arm was the smallest it has been after her last treatment.  She has been on vacation for several days and her arm is up again.  She has the shoulder piece she ordered, but can't figure out how to put it on, she feels like it gaps in the back of the arm.   -AL         Lymphedema Measurements    Measurement Type(s) Volumetric  -AL      Quick Girth Areas Upper extremities  -AL      Volumetric Areas Upper extremities  -AL      Volumetric UE Distance interval (cm) 5  -AL         LUE Quick Girth (cm)    Web space 19.9 cm  -AL      LUE Quick Girth Total 19.9  -AL         LUE Volumetric (cm)    Reference Point 0 19.5   wrist crease  -AL      5 25.2 cm  -AL      10 30.6 cm  -AL      15 32 cm  -AL      20 34.8 cm  -AL      25 36.8 cm   Elbow  -AL      30 41.1 cm  -AL      35 43.1 cm  -AL      40 45 cm   Axilla  -AL      LUE Volume Calculation- 5cm 4270.3  -AL         Manual Lymphatic Drainage    Manual Lymphatic Drainage initial sequence;opened regional lymph nodes;opened anastamoses;extremity treatment  -AL      Initial Sequence short neck;abdomen;diaphragmatic breathing  -AL      Supraclavicular left;right  -AL      Abdomen superficial;deep  -AL      Diaphragmatic Breathing x 9 with deep abdominals  -AL      Opened Regional Lymph Nodes axillary;inguinal  -AL      Inguinal left;right   "-AL      Opened Anastamoses axillo-inguinal  -AL      Axillo-Inguinal left;right  -AL      Extremity Treatment MLD to full limb;extremity treatment focus on   left arm and trunk  -AL      MLD to Full Limb Supine and sidelying  -AL         Compression/Skin Care    Compression/Skin Care wrapping location;skin care  -AL      Skin Care moisturizing lotion applied  -AL      Wrapping Location upper extremity  -AL      Wrapping Location UE left:;hand to axilla  -AL      Wrapping Comments 4\" sosa sahni soft for padding 1 roll. 6cm, 8cm figure 8, 10cm spiral, used to apply gradient compression to the LUE.   I then helped patient don the shoulder compression strap she ordered, pulling the garment as far as I could towards the armpit.  This made the shoulder section bunch at the shoulder.  I used three pieces of small stong velcro from her Reduction Kit to fold the garment over at the neck and secured with the velcro.    -AL      Bandage Layers cotton liner;soft foam- 1/4 inch;short-stretch bandages (comment size/quantity)  -AL      Bandaging Technique circumferential/spiral  -AL      Compression/Skin Care Comments I also tried the  Reduction Kit on her, but it is very worn out and does not have adequate compression. I did measure her for a [place] OTS arm piece. She will need a large, regular length so she can order a new inelastic garment.   -AL        User Key  (r) = Recorded By, (t) = Taken By, (c) = Cosigned By    Initials Name Provider Type    LAWRENCE Robertson PTA Physical Therapy Assistant                              PT Assessment/Plan     Row Name 06/19/18 0915          PT Assessment    Assessment Comments Patient has been on vacation, but she has been compliant with CDT as well as using her pump.  I did help her don the shoulder Piece, will see how she tolerates this.  Hopefully her new compression will be in soon, she is also going to order the OTS arm piece to wear when sleeping.   -AL        PT Plan    " PT Plan Comments Continue with CDT.   -AL       User Key  (r) = Recorded By, (t) = Taken By, (c) = Cosigned By    Initials Name Provider Type    LAWRENCE Robertson PTA Physical Therapy Assistant                     Exercises     Row Name 06/19/18 0915             Subjective Comments    Subjective Comments Her arm was the smallest it has been after her last treatment.  She has been on vacation for several days and her arm is up again.  She has the shoulder piece she ordered, but can't figure out how to put it on, she feels like it gaps in the back of the arm.   -AL         Subjective Pain    Able to rate subjective pain? yes  -AL      Pre-Treatment Pain Level 6  -AL      Subjective Pain Comment Left shoulder 6/10 with movement, 0/10 with rest.  -AL         Total Minutes    42972 - PT Manual Therapy Minutes 75  -AL        User Key  (r) = Recorded By, (t) = Taken By, (c) = Cosigned By    Initials Name Provider Type    LAWRENCE Robertson PTA Physical Therapy Assistant                              PT OP Goals     Row Name 06/19/18 0915          PT Short Term Goals    STG Date to Achieve 05/29/18  -AL     STG 1 Patient demonstrate decreased net edema of upper extremity >/= 10% for decreased risk of infection.   -AL     STG 1 Progress Ongoing  -AL     STG 1 Progress Comments The left arm has decreased since her last visit.   -AL     STG 2 Patient independent and compliant with self-massage techniques with spouse as needed for improved lymphatic drainage.  -AL     STG 2 Progress Ongoing;Progressing  -AL     STG 2 Progress Comments Compliant with her MLD  -AL     STG 3 Patient independent and compliant with initial home exercise program focused on deep breathing, range of motion for decreased edema and decreased risk of infection.   -AL     STG 3 Progress Ongoing;Progressing  -AL     STG 3 Progress Comments Compliant with the HEP  -AL     STG 4 Patient independent and compliant with self-wrapping techniques of compression  bandages with spouse for self-management of condition.   -AL     STG 4 Progress Ongoing  -AL     STG 4 Progress Comments Her  helps her wrap the arm   -AL        Long Term Goals    LTG Date to Achieve 06/26/18  -AL     LTG 1 Patient independent and compliant with compression garments as indicated for self-management of condition.   -AL     LTG 1 Progress Ongoing  -AL     LTG 1 Progress Comments New compression has been ordered, and she is gong to look into ordering a new inelastic garment as well.   -AL     LTG 2 Patient will have no s/s of celluliitis.   -AL     LTG 2 Progress Ongoing  -AL     LTG 2 Progress Comments No signs or symptoms of cellulitis.   -AL        Time Calculation    PT Goal Re-Cert Due Date 07/04/18  -AL       User Key  (r) = Recorded By, (t) = Taken By, (c) = Cosigned By    Initials Name Provider Type    LAWRENCE Robertson PTA Physical Therapy Assistant          Therapy Education  Education Details: Try the new shoulder piece, order the inelastic compression.  Given: Edema management  Program: Reinforced  How Provided: Verbal  Provided to: Patient  Level of Understanding: Verbalized              Time Calculation:   Start Time: 0915  Stop Time: 1030  Time Calculation (min): 75 min  Total Timed Code Minutes- PT: 75 minute(s)   Therapy Suggested Charges     Code   Minutes Charges    03804 (CPT®) Hc Pt Neuromusc Re Education Ea 15 Min      82966 (CPT®) Hc Pt Ther Proc Ea 15 Min      06941 (CPT®) Hc Gait Training Ea 15 Min      34914 (CPT®) Hc Pt Therapeutic Act Ea 15 Min      51145 (CPT®) Hc Pt Manual Therapy Ea 15 Min 75 5    93638 (CPT®) Hc Pt Ther Massage- Per 15 Min      00570 (CPT®) Hc Pt Iontophoresis Ea 15 Min      20843 (CPT®) Hc Pt Elec Stim Ea-Per 15 Min      03650 (CPT®) Hc Pt Ultrasound Ea 15 Min      44383 (CPT®) Hc Pt Self Care/Mgmt/Train Ea 15 Min      Total  75 5        Therapy Charges for Today     Code Description Service Date Service Provider Modifiers Qty    [id number]   PT MANUAL THERAPY EA 15 MIN 6/19/2018 Divina Robertson, PTA GP 5                    Divina Robertson PTA  6/19/2018

## 2018-06-21 ENCOUNTER — HOSPITAL ENCOUNTER (OUTPATIENT)
Dept: PHYSICAL THERAPY | Facility: HOSPITAL | Age: 63
Setting detail: THERAPIES SERIES
Discharge: HOME | End: 2018-06-21

## 2018-06-21 DIAGNOSIS — I97.2 POSTMASTECTOMY LYMPHEDEMA SYNDROME: Primary | ICD-10-CM

## 2018-06-21 PROCEDURE — 97140 MANUAL THERAPY 1/> REGIONS: CPT

## 2018-06-21 NOTE — THERAPY PROGRESS REPORT/RE-CERT
Outpatient Physical Therapy Lymphedema Treatment Note  [facility]     Patient Name: Demi Morataya  : 1955  MRN: [id number]  Today's Date: 2018        Visit Date: 2018    Visit Dx:    ICD-10-CM ICD-9-CM   1. 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 yes  -AL      Pre-Treatment Pain Level 6  -AL      Subjective Pain Comment Left shoulder  -AL         Subjective Comments    Subjective Comments She liked the way the shoulder stap felt and her shoulder did not hurt all day.  She did have some rubbing at the neck and under the arm.   -AL         Lymphedema Measurements    Measurement Type(s) Volumetric  -AL      Quick Girth Areas Upper extremities  -AL      Volumetric Areas Upper extremities  -AL      Volumetric UE Distance interval (cm) 5  -AL         LUE Quick Girth (cm)    Web space 20.8 cm  -AL      LUE Quick Girth Total 20.8  -AL         LUE Volumetric (cm)    Reference Point 0 19.4  -AL      5 26.6 cm  -AL      10 30.5 cm  -AL      15 34.4 cm  -AL      20 36.5 cm  -AL      25 35.6 cm  -AL      30 40 cm  -AL      35 45 cm  -AL      40 45 cm  -AL      LUE Volume Calculation- 5cm 4404.9  -AL         Manual Lymphatic Drainage    Manual Lymphatic Drainage initial sequence;opened regional lymph nodes;opened anastamoses;extremity treatment  -AL      Initial Sequence short neck;abdomen;diaphragmatic breathing  -AL      Supraclavicular left;right  -AL      Abdomen superficial;deep  -AL      Diaphragmatic Breathing x 9 with deep abdominals  -AL      Opened Regional Lymph Nodes axillary;inguinal  -AL      Inguinal left;right  -AL      Opened Anastamoses axillo-inguinal  -AL      Axillo-Inguinal left;right  -AL      Extremity Treatment MLD to full limb;extremity treatment focus on   left arm and trunk  -AL      MLD  "to Full Limb Supine and sidelying  -AL         Compression/Skin Care    Compression/Skin Care wrapping location;skin care  -AL      Skin Care moisturizing lotion applied  -AL      Wrapping Location upper extremity  -AL      Wrapping Location UE left:;hand to axilla  -AL      Wrapping Comments 4\" sosa sahni soft for padding 1 roll. 6cm, 8cm figure 8, 10cm spiral, used to apply gradient compression to the LUE.   I then helped patient don the shoulder compression strap she ordered, pulling the garment as far as I could towards the armpit.   I still used three pieces of small stong velcro from her Reduction Kit to fold the garment over at the neck and secured with the velcro.  I then made a padding out of cover roll with gauze on the inside and put this under the left arm where the brace rubs.   -AL      Bandage Layers cotton liner;soft foam- 1/4 inch;short-stretch bandages (comment size/quantity)  -AL      Bandaging Technique circumferential/spiral  -AL        User Key  (r) = Recorded By, (t) = Taken By, (c) = Cosigned By    Initials Name Provider Type    LAWRENCE Robertson PTA Physical Therapy Assistant                              PT Assessment/Plan     Row Name 06/21/18 0920          PT Assessment    Assessment Comments Patient has had an increase in size of the L UE.  She was in the pool yesterday and did not wrap the arm after she got out of the pool  She likes the shoulder support, she just needed padding under the left arm so the garment does not rub.    -AL        PT Plan    PT Plan Comments Continue with CDT.  -AL       User Key  (r) = Recorded By, (t) = Taken By, (c) = Cosigned By    Initials Name Provider Type    LAWRENCE Robertson PTA Physical Therapy Assistant                     Exercises     Row Name 06/21/18 0920             Subjective Comments    Subjective Comments She liked the way the shoulder stap felt and her shoulder did not hurt all day.  She did have some rubbing at the neck and under " the arm.   -AL         Subjective Pain    Able to rate subjective pain? yes  -AL      Pre-Treatment Pain Level 6  -AL      Subjective Pain Comment Left shoulder  -AL         Total Minutes    69604 - PT Manual Therapy Minutes 72  -AL        User Key  (r) = Recorded By, (t) = Taken By, (c) = Cosigned By    Initials Name Provider Type    LAWRENCE Robertson PTA Physical Therapy Assistant                              PT OP Goals     Row Name 06/21/18 0920          PT Short Term Goals    STG Date to Achieve 05/29/18  -AL     STG 1 Patient demonstrate decreased net edema of upper extremity >/= 10% for decreased risk of infection.   -AL     STG 1 Progress Ongoing  -AL     STG 1 Progress Comments The left arm is larger today  -AL     STG 2 Patient independent and compliant with self-massage techniques with spouse as needed for improved lymphatic drainage.  -AL     STG 2 Progress Ongoing;Progressing  -AL     STG 2 Progress Comments She is working on the MLD  -AL     STG 3 Patient independent and compliant with initial home exercise program focused on deep breathing, range of motion for decreased edema and decreased risk of infection.   -AL     STG 3 Progress Ongoing;Progressing  -AL     STG 3 Progress Comments She is working on the HEP  -AL     STG 4 Patient independent and compliant with self-wrapping techniques of compression bandages with spouse for self-management of condition.   -AL     STG 4 Progress Ongoing  -AL     STG 4 Progress Comments Her  wraps as needed.  -AL        Long Term Goals    LTG Date to Achieve 06/26/18  -AL     LTG 1 Patient independent and compliant with compression garments as indicated for self-management of condition.   -AL     LTG 1 Progress Ongoing  -AL     LTG 1 Progress Comments Her new compression is not in yet.  -AL     LTG 2 Patient will have no s/s of celluliitis.   -AL     LTG 2 Progress Ongoing  -AL     LTG 2 Progress Comments No s/s of cellulitis.  -AL        Time Calculation     PT Goal Re-Cert Due Date 07/04/18  -AL       User Key  (r) = Recorded By, (t) = Taken By, (c) = Cosigned By    Initials Name Provider Type    LAWRENCE Robertson PTA Physical Therapy Assistant          Therapy Education  Education Details: Continue with CDT  Given: Edema management  Program: Reinforced  How Provided: Verbal  Provided to: Patient  Level of Understanding: Verbalized              Time Calculation:   Start Time: 0920  Stop Time: 1032  Time Calculation (min): 72 min  Total Timed Code Minutes- PT: 72 minute(s)   Therapy Suggested Charges     Code   Minutes Charges    34508 (CPT®) Hc Pt Neuromusc Re Education Ea 15 Min      72466 (CPT®) Hc Pt Ther Proc Ea 15 Min      27748 (CPT®) Hc Gait Training Ea 15 Min      25702 (CPT®) Hc Pt Therapeutic Act Ea 15 Min      30924 (CPT®) Hc Pt Manual Therapy Ea 15 Min 72 5    54510 (CPT®) Hc Pt Ther Massage- Per 15 Min      11485 (CPT®) Hc Pt Iontophoresis Ea 15 Min      45697 (CPT®) Hc Pt Elec Stim Ea-Per 15 Min      45417 (CPT®) Hc Pt Ultrasound Ea 15 Min      26663 (CPT®) Hc Pt Self Care/Mgmt/Train Ea 15 Min      Total  72 5        Therapy Charges for Today     Code Description Service Date Service Provider Modifiers Qty    63719353731 HC PT MANUAL THERAPY EA 15 MIN 6/21/2018 Divina Robertson PTA GP 5                    Divina Robertson PTA  6/21/2018

## 2018-06-26 ENCOUNTER — HOSPITAL ENCOUNTER (OUTPATIENT)
Dept: PHYSICAL THERAPY | Facility: HOSPITAL | Age: 63
Setting detail: THERAPIES SERIES
Discharge: HOME | End: 2018-06-26

## 2018-06-26 DIAGNOSIS — I97.2 POSTMASTECTOMY LYMPHEDEMA SYNDROME: Primary | ICD-10-CM

## 2018-06-26 PROCEDURE — 97140 MANUAL THERAPY 1/> REGIONS: CPT

## 2018-06-26 NOTE — THERAPY TREATMENT NOTE
"    Outpatient Physical Therapy Lymphedema Treatment Note   [place]     Patient Name: Demi Morataya  : 1955  MRN: [id number]  Today's Date: 2018        Visit Date: 2018    Visit Dx:    ICD-10-CM ICD-9-CM   1. 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 yes  -AL      Pre-Treatment Pain Level 0  -AL         Subjective Comments    Subjective Comments She was able to wear the shoulder strap over the weekend and did fine.  The padding under the arm helped.    -AL         Manual Lymphatic Drainage    Manual Lymphatic Drainage initial sequence;opened regional lymph nodes;opened anastamoses;extremity treatment  -AL      Initial Sequence short neck;abdomen;diaphragmatic breathing  -AL      Supraclavicular left;right  -AL      Abdomen superficial;deep  -AL      Diaphragmatic Breathing x 9 with deep abdominals  -AL      Opened Regional Lymph Nodes axillary;inguinal  -AL      Inguinal left;right  -AL      Opened Anastamoses axillo-inguinal  -AL      Axillo-Inguinal left;right  -AL      Extremity Treatment MLD to full limb;extremity treatment focus on   left arm and trunk  -AL      MLD to Full Limb Supine and sidelying  -AL         Compression/Skin Care    Compression/Skin Care wrapping location;skin care  -AL      Skin Care moisturizing lotion applied  -AL      Wrapping Location upper extremity  -AL      Wrapping Location UE left:;hand to axilla  -AL      Wrapping Comments 4\" stockinette, rosidal soft for padding 1 roll. 6cm, 8cm figure 8, 10cm spiral, used to apply gradient compression to the LUE.   I then helped patient don the shoulder compression strap she ordered, pulling the garment as far as I could towards the armpit.   I still used three pieces of small stong velcro from her Reduction Kit to fold the garment " over at the neck and secured with the velcro.  I then made a padding out of cover roll with gauze on the inside and put this under the left arm where the brace rubs.   -AL      Bandage Layers cotton liner;soft foam- 1/4 inch;short-stretch bandages (comment size/quantity)  -AL      Bandaging Technique circumferential/spiral  -AL      Compression/Skin Care Comments She will re-wrap the arm as needed.  She does have a size 10 Kayley strong 20-30 mmHg sleeve and a size 7 Kayley Strong 20-30 mmHg glove.  I tried these on before wrapping the arm.  She needs to put the glove on first then the sleeve.  Will see how she does wearing the sleeve after she has been wrapped.   -AL        User Key  (r) = Recorded By, (t) = Taken By, (c) = Cosigned By    Initials Name Provider Type    LAWRENCE Robertson PTA Physical Therapy Assistant                              PT Assessment/Plan     Row Name 06/26/18 0915          PT Assessment    Assessment Comments The shoulder strap is working well for her, she is not having any left shoulder pain today.  The new sleeve she has falls into a crease at her wrist, the sleeve is better if it is worn over the glove.  Will see how she does with the new sleeve and glove after she has had her arm wrapped for a few days.   -AL        PT Plan    PT Plan Comments Continue with CDT.   -AL       User Key  (r) = Recorded By, (t) = Taken By, (c) = Cosigned By    Initials Name Provider Type    LAWRENCE Robertson PTA Physical Therapy Assistant                     Exercises     Row Name 06/26/18 0915             Subjective Comments    Subjective Comments She was able to wear the shoulder strap over the weekend and did fine.  The padding under the arm helped.    -AL         Subjective Pain    Able to rate subjective pain? yes  -AL      Pre-Treatment Pain Level 0  -AL         Total Minutes    58985 - PT Manual Therapy Minutes 80  -AL        User Key  (r) = Recorded By, (t) = Taken By, (c) = Cosigned By    Initials  Name Provider Type    LAWRENCE Robertson PTA Physical Therapy Assistant                              PT OP Goals     Row Name 06/26/18 0915          PT Short Term Goals    STG Date to Achieve 05/29/18  -AL     STG 1 Patient demonstrate decreased net edema of upper extremity >/= 10% for decreased risk of infection.   -AL     STG 1 Progress Ongoing  -AL     STG 1 Progress Comments Will measure next treatment.  -AL     STG 2 Patient independent and compliant with self-massage techniques with spouse as needed for improved lymphatic drainage.  -AL     STG 2 Progress Ongoing;Progressing  -AL     STG 2 Progress Comments She is working on the MLD  -AL     STG 3 Patient independent and compliant with initial home exercise program focused on deep breathing, range of motion for decreased edema and decreased risk of infection.   -AL     STG 3 Progress Ongoing;Progressing  -AL     STG 4 Patient independent and compliant with self-wrapping techniques of compression bandages with spouse for self-management of condition.   -AL     STG 4 Progress Ongoing  -AL     STG 4 Progress Comments She wraps the arm as needed.   -AL        Long Term Goals    LTG Date to Achieve 06/26/18  -AL     LTG 1 Patient independent and compliant with compression garments as indicated for self-management of condition.   -AL     LTG 1 Progress Ongoing  -AL     LTG 1 Progress Comments She has her new compression garments today.  -AL     LTG 2 Patient will have no s/s of celluliitis.   -AL     LTG 2 Progress Ongoing  -AL        Time Calculation    PT Goal Re-Cert Due Date 07/04/18  -AL       User Key  (r) = Recorded By, (t) = Taken By, (c) = Cosigned By    Initials Name Provider Type    LAWRENCE Robertson PTA Physical Therapy Assistant          Therapy Education  Education Details: Wrap the L UE as needed until her next appointment.   Given: Edema management  Program: Reinforced  How Provided: Verbal  Provided to: Patient  Level of Understanding: Verbalized               Time Calculation:   Start Time: 0915  Stop Time: 1035  Time Calculation (min): 80 min  Total Timed Code Minutes- PT: 80 minute(s)   Therapy Suggested Charges     Code   Minutes Charges    16518 (CPT®) Hc Pt Neuromusc Re Education Ea 15 Min      32156 (CPT®) Hc Pt Ther Proc Ea 15 Min      73743 (CPT®) Hc Gait Training Ea 15 Min      77521 (CPT®) Hc Pt Therapeutic Act Ea 15 Min      67616 (CPT®) Hc Pt Manual Therapy Ea 15 Min 80 5    14849 (CPT®) Hc Pt Ther Massage- Per 15 Min      02971 (CPT®) Hc Pt Iontophoresis Ea 15 Min      28478 (CPT®) Hc Pt Elec Stim Ea-Per 15 Min      22810 (CPT®) Hc Pt Ultrasound Ea 15 Min      50123 (CPT®) Hc Pt Self Care/Mgmt/Train Ea 15 Min      Total  80 5        Therapy Charges for Today     Code Description Service Date Service Provider Modifiers Qty    54844248852 HC PT MANUAL THERAPY EA 15 MIN 6/26/2018 Divina Robertson, PTA GP 5                    Divina Robertson PTA  6/26/2018

## 2018-06-28 ENCOUNTER — HOSPITAL ENCOUNTER (OUTPATIENT)
Dept: PHYSICAL THERAPY | Facility: HOSPITAL | Age: 63
Setting detail: THERAPIES SERIES
Discharge: HOME | End: 2018-06-28

## 2018-06-28 DIAGNOSIS — I97.2 POSTMASTECTOMY LYMPHEDEMA SYNDROME: Primary | ICD-10-CM

## 2018-06-28 PROCEDURE — 97140 MANUAL THERAPY 1/> REGIONS: CPT

## 2018-06-28 NOTE — THERAPY PROGRESS REPORT/RE-CERT
Outpatient Physical Therapy Lymphedema Progress Note  [facility]     Patient Name: Demi Morataya  : 1955  MRN: [id number]  Today's Date: 2018        Visit Date: 2018    Visit Dx:    ICD-10-CM ICD-9-CM   1. 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 yes  -AL      Pre-Treatment Pain Level 6  -AL      Subjective Pain Comment Left shoulder  -AL         Subjective Comments    Subjective Comments She woke up with shoulder pain this morning.  She is going on vacation, she is taking her pump with her.     -AL         Lymphedema Measurements    Measurement Type(s) Volumetric  -AL      Quick Girth Areas Upper extremities  -AL      Volumetric Areas Upper extremities  -AL      Volumetric UE Distance interval (cm) 5  -AL         LUE Quick Girth (cm)    Web space 20.5 cm  -AL      LUE Quick Girth Total 20.5  -AL         LUE Volumetric (cm)    Reference Point 0 19.3   wrist crease  -AL      5 25.2 cm  -AL      10 29.5 cm  -AL      15 34 cm  -AL      20 36 cm  -AL      25 36.5 cm   elbow  -AL      30 40.4 cm  -AL      35 43 cm  -AL      40 44.8 cm   Axilla  -AL      LUE Volume Calculation- 5cm 4288.3  -AL         Manual Lymphatic Drainage    Manual Lymphatic Drainage initial sequence;opened regional lymph nodes;opened anastamoses;extremity treatment  -AL      Initial Sequence short neck;abdomen;diaphragmatic breathing  -AL      Supraclavicular left;right  -AL      Abdomen superficial;deep  -AL      Diaphragmatic Breathing x 9 with deep abdominals  -AL      Opened Regional Lymph Nodes axillary;inguinal  -AL      Inguinal left;right  -AL      Opened Anastamoses axillo-inguinal  -AL      Axillo-Inguinal left;right  -AL      Extremity Treatment MLD to full limb;extremity treatment focus on   left arm and trunk  -AL      MLD  "to Full Limb Supine and sidelying  -AL         Compression/Skin Care    Compression/Skin Care wrapping location;skin care  -AL      Skin Care moisturizing lotion applied  -AL      Wrapping Location upper extremity  -AL      Wrapping Location UE left:;hand to axilla  -AL      Wrapping Comments 4\" sosa sahni soft for padding 1 roll. 6cm, 8cm figure 8, 10cm spiral, used to apply gradient compression to the LUE.   I then helped patient don the shoulder compression strap she ordered, pulling the garment as far as I could towards the armpit.   I still used three pieces of small stong velcro from her Reduction Kit to fold the garment over at the neck and secured with the velcro.  I then made a padding out of cover roll with gauze on the inside and put this under the left arm where the brace rubs.   -AL      Bandage Layers cotton liner;soft foam- 1/4 inch;short-stretch bandages (comment size/quantity)  -AL      Bandaging Technique circumferential/spiral  -AL      Compression/Skin Care Comments The glove fits fine, but the sleeve is too tight at the wrist and falls in a crease.  She will have to be measured for a custom sleeve.    -AL        User Key  (r) = Recorded By, (t) = Taken By, (c) = Cosigned By    Initials Name Provider Type    LAWRENCE Robertson PTA Physical Therapy Assistant                              PT Assessment/Plan     Row Name 06/28/18 0730          PT Assessment    Assessment Comments Patient will not fit into a ready to wear sleeve, she will have to be measured for a custom sleeve for the L UE.  Her new glove fits well, so she will be able to keep the hand compression.  The L UE has reduced since her last treatment.  I do believe her L UE has reduced enough since the inital eval for her to go ahead and be measured for a custom sleeve.  She has also ordered the [facility] for night compression.   -AL        PT Plan    PT Plan Comments Will continue with complete decongestive therapy.   -AL     "   User Key  (r) = Recorded By, (t) = Taken By, (c) = Cosigned By    Initials Name Provider Type    LAWRENCE Robertson PTA Physical Therapy Assistant                     Exercises     Row Name 06/28/18 0745             Subjective Comments    Subjective Comments She woke up with shoulder pain this morning.  She is going on vacation, she is taking her pump with her.     -AL         Subjective Pain    Able to rate subjective pain? yes  -AL      Pre-Treatment Pain Level 6  -AL      Subjective Pain Comment Left shoulder  -AL         Total Minutes    66133 - PT Manual Therapy Minutes 65  -AL        User Key  (r) = Recorded By, (t) = Taken By, (c) = Cosigned By    Initials Name Provider Type    LAWRENCE Robertson PTA Physical Therapy Assistant                              PT OP Goals     Row Name 06/28/18 0745          PT Short Term Goals    STG Date to Achieve 05/29/18  -AL     STG 1 Patient demonstrate decreased net edema of upper extremity >/= 10% for decreased risk of infection.   -AL     STG 1 Progress Ongoing  -AL     STG 1 Progress Comments She has had a decrease in size of the L UE since last treatment.   -AL     STG 2 Patient independent and compliant with self-massage techniques with spouse as needed for improved lymphatic drainage.  -AL     STG 2 Progress Ongoing;Progressing  -AL     STG 2 Progress Comments She is working on the MLD.  -AL     STG 3 Patient independent and compliant with initial home exercise program focused on deep breathing, range of motion for decreased edema and decreased risk of infection.   -AL     STG 3 Progress Ongoing;Progressing  -AL     STG 3 Progress Comments Compliant with HEP and also getting into the pool.  -AL     STG 4 Patient independent and compliant with self-wrapping techniques of compression bandages with spouse for self-management of condition.   -AL     STG 4 Progress Ongoing;Partially Met  -AL     STG 4 Progress Comments Her  wraps as needed.  -AL        Long Term  Goals    LTG Date to Achieve 06/26/18  -AL     LTG 1 Patient independent and compliant with compression garments as indicated for self-management of condition.   -AL     LTG 1 Progress Ongoing  -AL     LTG 1 Progress Comments She is going to be measured for a custom sleeve.   -AL     LTG 2 Patient will have no s/s of celluliitis.   -AL     LTG 2 Progress Ongoing;Progressing  -AL     LTG 2 Progress Comments No s/s of cellulitis.  -AL        Time Calculation    PT Goal Re-Cert Due Date 07/28/18  -AL       User Key  (r) = Recorded By, (t) = Taken By, (c) = Cosigned By    Initials Name Provider Type    LAWRENCE Robertson PTA Physical Therapy Assistant          Therapy Education  Education Details: Continue with CDT as well as using the Flexitouch pump.  Get measured for a custom sleeve.  Given: Edema management  Program: Progressed  How Provided: Verbal  Provided to: Patient  Level of Understanding: Verbalized              Time Calculation:   Start Time: 0745  Stop Time: 0850  Time Calculation (min): 65 min  Total Timed Code Minutes- PT: 65 minute(s)   Therapy Suggested Charges     Code   Minutes Charges    74373 (CPT®) Hc Pt Neuromusc Re Education Ea 15 Min      38554 (CPT®) Hc Pt Ther Proc Ea 15 Min      15936 (CPT®) Hc Gait Training Ea 15 Min      55895 (CPT®) Hc Pt Therapeutic Act Ea 15 Min      83898 (CPT®) Hc Pt Manual Therapy Ea 15 Min 65 4    89808 (CPT®) Hc Pt Ther Massage- Per 15 Min      57730 (CPT®) Hc Pt Iontophoresis Ea 15 Min      46045 (CPT®) Hc Pt Elec Stim Ea-Per 15 Min      58094 (CPT®) Hc Pt Ultrasound Ea 15 Min      65653 (CPT®) Hc Pt Self Care/Mgmt/Train Ea 15 Min      Total  65 4        Therapy Charges for Today     Code Description Service Date Service Provider Modifiers Qty    [id number] HC PT MANUAL THERAPY EA 15 MIN 6/28/2018 Divina Robertson PTA GP 4                    Divina Robertson PTA  6/28/2018

## 2018-07-17 ENCOUNTER — HOSPITAL ENCOUNTER (OUTPATIENT)
Dept: PHYSICAL THERAPY | Facility: HOSPITAL | Age: 63
Setting detail: THERAPIES SERIES
Discharge: HOME | End: 2018-07-17

## 2018-07-17 DIAGNOSIS — I97.2 POSTMASTECTOMY LYMPHEDEMA SYNDROME: Primary | ICD-10-CM

## 2018-07-17 PROCEDURE — G8991 OTHER PT/OT GOAL STATUS: HCPCS | Performed by: PHYSICAL THERAPIST

## 2018-07-17 PROCEDURE — 97140 MANUAL THERAPY 1/> REGIONS: CPT

## 2018-07-17 PROCEDURE — G8990 OTHER PT/OT CURRENT STATUS: HCPCS | Performed by: PHYSICAL THERAPIST

## 2018-07-17 NOTE — THERAPY PROGRESS REPORT/RE-CERT
"    Outpatient Physical Therapy Lymphedema Progress Note/G code update  [facility]     Patient Name: Demi Morataya  : 1955  MRN: [id number]  Today's Date: 2018        Visit Date: 2018    Visit Dx:    ICD-10-CM ICD-9-CM   1. 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 yes  -AL      Pre-Treatment Pain Level 10  -AL      Post-Treatment Pain Level 3  -AL      Subjective Pain Comment left shoulder  -AL         Subjective Comments    Subjective Comments Her shoulder has been hurting a lot, she is going to contact her Dr. about trying steroids.  Her arm is doing well.   The OTS Farrow does not seem tight enough.  She has to wear some type of hand compression or her hand goes numb.  She also tends to keep her wrist in flexion which she thinks may have something to do with her hand being numb at night with the garment on.   She is planning on being meaured for a custom sleeve this week.   -AL         Manual Lymphatic Drainage    Manual Lymphatic Drainage initial sequence;opened regional lymph nodes;opened anastamoses;extremity treatment  -AL      Initial Sequence short neck;abdomen;diaphragmatic breathing  -AL      Supraclavicular left;right  -AL      Abdomen superficial;deep  -AL      Opened Regional Lymph Nodes axillary;inguinal  -AL      Inguinal left;right  -AL      Opened Anastamoses axillo-inguinal  -AL      Axillo-Inguinal left;right  -AL      Extremity Treatment MLD to full limb;extremity treatment focus on   left arm and trunk  -AL         Compression/Skin Care    Compression/Skin Care wrapping location;skin care  -AL      Skin Care moisturizing lotion applied  -AL      Wrapping Location upper extremity  -AL      Wrapping Location UE left:;hand to axilla  -AL      Wrapping Comments 4\" stockinette, rosidal " soft for padding 1 roll. 6cm, 8cm figure 8, 10cm spiral, used to apply gradient compression to the LUE.   I then helped patient don the shoulder compression strap she ordered, pulling the garment as far as I could towards the armpit.   I still used three pieces of small stong velcro from her Reduction Kit to fold the garment over at the neck and secured with the velcro.    -AL      Bandage Layers cotton liner;soft foam- 1/4 inch;short-stretch bandages (comment size/quantity)  -AL      Bandaging Technique circumferential/spiral  -AL      Compression/Skin Care Comments Wear the OTS Farrow for the L UE at night with some type of hand compression on.  Tried the light compression glove, but it was too small at the forearm.   -AL        User Key  (r) = Recorded By, (t) = Taken By, (c) = Cosigned By    Initials Name Provider Type    LAWRENCE Robertson PTA Physical Therapy Assistant                              PT Assessment/Plan     Row Name 07/17/18 0992          PT Assessment    Assessment Comments Patient is planning on being measured this week for a custom sleeve.  Her arm is doing well and is not painful, the arm is soft.  She is still having pain in the left shoulder, but the brace helps.  She is going to speak to her Dr. about steroids to see if this helps.    -AL        PT Plan    PT Plan Comments Will continue with CDT   -AL       User Key  (r) = Recorded By, (t) = Taken By, (c) = Cosigned By    Initials Name Provider Type    LAWRENCE Robertson PTA Physical Therapy Assistant                     Exercises     Row Name 07/17/18 4452             Subjective Comments    Subjective Comments Her shoulder has been hurting a lot, she is going to contact her Dr. about trying steroids.  Her arm is doing well.   The OTS Farrow does not seem tight enough.  She has to wear some type of hand compression or her hand goes numb.  She also tends to keep her wrist in flexion which she thinks may have something to do with her hand being  numb at night with the garment on.   She is planning on being meaured for a custom sleeve this week.   -AL         Subjective Pain    Able to rate subjective pain? yes  -AL      Pre-Treatment Pain Level 10  -AL      Post-Treatment Pain Level 3  -AL      Subjective Pain Comment left shoulder  -AL         Total Minutes    58799 - PT Manual Therapy Minutes 80  -AL        User Key  (r) = Recorded By, (t) = Taken By, (c) = Cosigned By    Initials Name Provider Type    LAWRENCE Robertson PTA Physical Therapy Assistant                              PT OP Goals     Row Name 07/17/18 0915          PT Short Term Goals    STG Date to Achieve 05/29/18  -AL     STG 1 Patient demonstrate decreased net edema of upper extremity >/= 10% for decreased risk of infection.   -AL     STG 1 Progress Ongoing  -AL     STG 1 Progress Comments Will meaure next treatment  -AL     STG 2 Patient independent and compliant with self-massage techniques with spouse as needed for improved lymphatic drainage.  -AL     STG 2 Progress Ongoing;Progressing  -AL     STG 2 Progress Comments Compliant with MLD  -AL     STG 3 Patient independent and compliant with initial home exercise program focused on deep breathing, range of motion for decreased edema and decreased risk of infection.   -AL     STG 3 Progress Ongoing;Progressing  -AL     STG 3 Progress Comments Compliant with HEP  -AL     STG 4 Patient independent and compliant with self-wrapping techniques of compression bandages with spouse for self-management of condition.   -AL     STG 4 Progress Ongoing;Partially Met  -AL     STG 4 Progress Comments Compliant with compression.  -AL        Long Term Goals    LTG Date to Achieve 06/26/18  -AL     LTG 1 Patient independent and compliant with compression garments as indicated for self-management of condition.   -AL     LTG 1 Progress Ongoing  -AL     LTG 1 Progress Comments She is planning on being meaured for a custom sleeve this week.   -AL     LTG 2  Patient will have no s/s of celluliitis.   -AL     LTG 2 Progress Ongoing;Progressing  -AL     LTG 2 Progress Comments No s/s of cellulitis.  -AL        Time Calculation    PT Goal Re-Cert Due Date 07/28/18  -AL       User Key  (r) = Recorded By, (t) = Taken By, (c) = Cosigned By    Initials Name Provider Type    LAWRENCE Robertson PTA Physical Therapy Assistant          Therapy Education  Education Details: Wear the Farrow arm garment at night, can try to wear during the day as well.  Get measured for a custom sleeve.   Given: Edema management  Program: Progressed  How Provided: Verbal  Provided to: Patient  Level of Understanding: Verbalized              Time Calculation:   Start Time: 0915  Stop Time: 1035  Time Calculation (min): 80 min  Total Timed Code Minutes- PT: 80 minute(s)   Therapy Suggested Charges     Code   Minutes Charges    65252 (CPT®) Hc Pt Neuromusc Re Education Ea 15 Min      61902 (CPT®) Hc Pt Ther Proc Ea 15 Min      58676 (CPT®) Hc Gait Training Ea 15 Min      23311 (CPT®) Hc Pt Therapeutic Act Ea 15 Min      05772 (CPT®) Hc Pt Manual Therapy Ea 15 Min 80 5    99528 (CPT®) Hc Pt Ther Massage- Per 15 Min      14404 (CPT®) Hc Pt Iontophoresis Ea 15 Min      58144 (CPT®) Hc Pt Elec Stim Ea-Per 15 Min      27806 (CPT®) Hc Pt Ultrasound Ea 15 Min      50917 (CPT®) Hc Pt Self Care/Mgmt/Train Ea 15 Min      Total  80 5        Therapy Charges for Today     Code Description Service Date Service Provider Modifiers Qty    14412664501 HC PT OTHER PRIME FUNCT CURRENT 7/17/2018 Kerline Good, PT, DPT, CLT-SALVADOR GP, CK 1    13119118102 HC PT OTHER PRIME FUNCT PROJECTED 7/17/2018 Kerline Good, PT, DPT, CLT-SALVADOR GP, CJ 1          PT G-Codes  Functional Limitation: Other PT primary  Other PT Primary Current Status (): At least 40 percent but less than 60 percent impaired, limited or restricted  Other PT Primary Goal Status (): At least 20 percent but less than 40 percent impaired,  limited or restricted         Kerline Good, PT, DPT, CLT-SALVADOR  7/17/2018

## 2018-07-19 ENCOUNTER — HOSPITAL ENCOUNTER (OUTPATIENT)
Dept: PHYSICAL THERAPY | Facility: HOSPITAL | Age: 63
Setting detail: THERAPIES SERIES
Discharge: HOME | End: 2018-07-19

## 2018-07-19 DIAGNOSIS — I97.2 POSTMASTECTOMY LYMPHEDEMA SYNDROME: Primary | ICD-10-CM

## 2018-07-19 PROCEDURE — 97140 MANUAL THERAPY 1/> REGIONS: CPT

## 2018-07-19 NOTE — THERAPY TREATMENT NOTE
Outpatient Physical Therapy Lymphedema Treatment Note  [facility]     Patient Name: Demi Morataya  : 1955  MRN: [id number]  Today's Date: 2018        Visit Date: 2018    Visit Dx:    ICD-10-CM ICD-9-CM   1. 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 yes  -AL      Pre-Treatment Pain Level 7  -AL      Subjective Pain Comment Left shoulder  -AL         Subjective Comments    Subjective Comments She is still having a lot of left shoulder pain.  She is going to call an Ortho Dr about the shoulder..  She wore the OTS garment last night, but when she woke up she had taken it off in her sleep.  -AL         Lymphedema Measurements    Measurement Type(s) Volumetric  -AL      Quick Girth Areas Upper extremities  -AL      Volumetric Areas Upper extremities  -AL      Volumetric UE Distance interval (cm) 5  -AL         LUE Quick Girth (cm)    Web space 19.9 cm  -AL      LUE Quick Girth Total 19.9  -AL         LUE Volumetric (cm)    Reference Point 0 19   wrist crease  -AL      5 26.3 cm  -AL      10 30.7 cm  -AL      15 34.1 cm  -AL      20 35 cm  -AL      25 35.2 cm   elbow  -AL      30 40.5 cm  -AL      35 42.6 cm  -AL      40 44 cm   axilla  -AL      LUE Volume Calculation- 5cm 4238.3  -AL         Manual Lymphatic Drainage    Manual Lymphatic Drainage initial sequence;opened regional lymph nodes;opened anastamoses;extremity treatment  -AL      Initial Sequence short neck;abdomen;diaphragmatic breathing  -AL      Supraclavicular left;right  -AL      Abdomen superficial;deep  -AL      Opened Regional Lymph Nodes axillary;inguinal  -AL      Inguinal left;right  -AL      Opened Anastamoses axillo-inguinal  -AL      Axillo-Inguinal left;right  -AL      Extremity Treatment MLD to full limb;extremity treatment focus on    left arm and trunk  -AL      MLD to Full Limb Supine and sidelying  -AL         Compression/Skin Care    Compression/Skin Care Comments She donned the OTS garment and shoulder support.  She is going to be measured today for a custom garment.  Gave patient a roll of Coband to use over stockinette to put over most proximal arm where compression does not cover.   -AL        User Key  (r) = Recorded By, (t) = Taken By, (c) = Cosigned By    Initials Name Provider Type    LAWRENCE Robertson PTA Physical Therapy Assistant                              PT Assessment/Plan     Row Name 07/19/18 0915          PT Assessment    Assessment Comments Patient has had a slight increase in size of the forearm, her other measurements are close to the same or less.  She is going to be measured today for a custom sleeve.  She may try using the coban most proximal arm to help areas that are not covered by the compression.   -AL        PT Plan    PT Plan Comments Cotninue with POC.   -AL       User Key  (r) = Recorded By, (t) = Taken By, (c) = Cosigned By    Initials Name Provider Type    LAWRENCE Robertson PTA Physical Therapy Assistant                     Exercises     Row Name 07/19/18 0915             Subjective Comments    Subjective Comments She is still having a lot of left shoulder pain.  She is going to call an Ortho Dr about the shoulder..  She wore the OTS garment last night, but when she woke up she had taken it off in her sleep.  -AL         Subjective Pain    Able to rate subjective pain? yes  -AL      Pre-Treatment Pain Level 7  -AL      Subjective Pain Comment Left shoulder  -AL         Total Minutes    89258 - PT Manual Therapy Minutes 75  -AL        User Key  (r) = Recorded By, (t) = Taken By, (c) = Cosigned By    Initials Name Provider Type    LAWRENCE Robertson PTA Physical Therapy Assistant                              PT OP Goals     Row Name 07/19/18 0915          PT Short Term Goals    STG Date to Achieve 05/29/18   -AL     STG 1 Patient demonstrate decreased net edema of upper extremity >/= 10% for decreased risk of infection.   -AL     STG 1 Progress Ongoing  -AL     STG 2 Patient independent and compliant with self-massage techniques with spouse as needed for improved lymphatic drainage.  -AL     STG 2 Progress Ongoing;Progressing;Partially Met  -AL     STG 2 Progress Comments Compliant with MLD  -AL     STG 3 Patient independent and compliant with initial home exercise program focused on deep breathing, range of motion for decreased edema and decreased risk of infection.   -AL     STG 3 Progress Ongoing;Progressing;Partially Met  -AL     STG 3 Progress Comments Compliant with HEP  -AL     STG 4 Patient independent and compliant with self-wrapping techniques of compression bandages with spouse for self-management of condition.   -AL     STG 4 Progress Partially Met;Ongoing  -AL     STG 4 Progress Comments  wraps the L UE as needed  -AL        Long Term Goals    LTG Date to Achieve 06/26/18  -AL     LTG 1 Patient independent and compliant with compression garments as indicated for self-management of condition.   -AL     LTG 1 Progress Ongoing  -AL     LTG 1 Progress Comments She now has the OTS garment she wears and is going to be measured for a custom sleeve today  -AL     LTG 2 Patient will have no s/s of celluliitis.   -AL     LTG 2 Progress Ongoing;Progressing  -AL     LTG 2 Progress Comments No s/s of cellulitis.  -AL        Time Calculation    PT Goal Re-Cert Due Date 08/16/18  -AL       User Key  (r) = Recorded By, (t) = Taken By, (c) = Cosigned By    Initials Name Provider Type    LAWRENCE Robertson PTA Physical Therapy Assistant          Therapy Education  Education Details: Get measured for custom sleeve today, use coban most proximal arm as needed.  Given: Edema management  Program: New  How Provided: Verbal  Provided to: Patient  Level of Understanding: Verbalized              Time Calculation:   Start Time:  0915  Stop Time: 1030  Time Calculation (min): 75 min  Total Timed Code Minutes- PT: 75 minute(s)   Therapy Suggested Charges     Code   Minutes Charges    17201 (CPT®) Hc Pt Neuromusc Re Education Ea 15 Min      96187 (CPT®) Hc Pt Ther Proc Ea 15 Min      96154 (CPT®) Hc Gait Training Ea 15 Min      28788 (CPT®) Hc Pt Therapeutic Act Ea 15 Min      35159 (CPT®) Hc Pt Manual Therapy Ea 15 Min 75 5    06534 (CPT®) Hc Pt Ther Massage- Per 15 Min      13304 (CPT®) Hc Pt Iontophoresis Ea 15 Min      21665 (CPT®) Hc Pt Elec Stim Ea-Per 15 Min      05399 (CPT®) Hc Pt Ultrasound Ea 15 Min      70794 (CPT®) Hc Pt Self Care/Mgmt/Train Ea 15 Min      Total  75 5        Therapy Charges for Today     Code Description Service Date Service Provider Modifiers Qty    36655605607 HC PT MANUAL THERAPY EA 15 MIN 7/19/2018 Divina Robertson, PTA GP 5                    Divina Robertson, PTA  7/19/2018

## 2018-07-24 ENCOUNTER — HOSPITAL ENCOUNTER (OUTPATIENT)
Dept: PHYSICAL THERAPY | Facility: HOSPITAL | Age: 63
Setting detail: THERAPIES SERIES
Discharge: HOME | End: 2018-07-24

## 2018-07-24 DIAGNOSIS — I97.2 POSTMASTECTOMY LYMPHEDEMA SYNDROME: Primary | ICD-10-CM

## 2018-07-24 PROCEDURE — 97140 MANUAL THERAPY 1/> REGIONS: CPT

## 2018-07-24 NOTE — THERAPY TREATMENT NOTE
"    Outpatient Physical Therapy Lymphedema Treatment Note  [facility]     Patient Name: Demi Morataya  : 1955  MRN: [id number]  Today's Date: 2018        Visit Date: 2018    Visit Dx:    ICD-10-CM ICD-9-CM   1. 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       Patient Active Problem List   Diagnosis   • Kidney stone on left side              Lymphedema     Row Name 18 0925             Subjective Pain    Able to rate subjective pain? yes  -AL      Pre-Treatment Pain Level 6  -AL      Subjective Pain Comment Left shoulder  -AL         Subjective Comments    Subjective Comments She has been measured for her custom sleeve.  She also made an appointment with Dr. Ramos for her left shoulder.    -AL         Manual Lymphatic Drainage    Manual Lymphatic Drainage initial sequence;opened regional lymph nodes;opened anastamoses;extremity treatment  -AL      Initial Sequence short neck;abdomen;diaphragmatic breathing  -AL      Supraclavicular left;right  -AL      Abdomen superficial;deep  -AL      Opened Regional Lymph Nodes axillary;inguinal  -AL      Inguinal left;right  -AL      Opened Anastamoses axillo-inguinal  -AL      Axillo-Inguinal left;right  -AL      Extremity Treatment MLD to full limb;extremity treatment focus on   left arm and trunk  -AL      MLD to Full Limb Supine and sidelying  -AL         Compression/Skin Care    Compression/Skin Care wrapping location;skin care  -AL      Skin Care moisturizing lotion applied  -AL      Wrapping Location upper extremity  -AL      Wrapping Location UE left:;hand to axilla  -AL      Wrapping Comments 4\" stockinette, chip bag over forearm, rosidal soft for padding 1 roll. 6cm, 8cm figure 8, 10cm spiral, used to apply gradient compression to the LUE.   I then helped patient don the shoulder compression strap she ordered, pulling the garment as far as I could towards the armpit.   I still used three pieces of small stong velcro from her Reduction Kit " to fold the garment over at the neck and secured with the velcro.    -AL      Bandage Layers cotton liner;soft foam- 1/4 inch;short-stretch bandages (comment size/quantity)  -AL      Bandaging Technique circumferential/spiral  -AL      Compression/Skin Care Comments She will try using a long stretch bandage on the most proximal area of the L arm when she wears the OTS garment.   -AL        User Key  (r) = Recorded By, (t) = Taken By, (c) = Cosigned By    Initials Name Provider Type    LAWRENCE Robertson PTA Physical Therapy Assistant                              PT Assessment/Plan     Row Name 07/24/18 0925          PT Assessment    Assessment Comments The arm is less dense today, just a small area in the forearm close to the wrist.  Used her chip bag in this area.  She is going to try to use a long stretch bandage over the OTS garment most proximal area.  Will see how this works to help the upper arm where the garment slips.  -AL        PT Plan    PT Plan Comments Continue with CDT.   -AL       User Key  (r) = Recorded By, (t) = Taken By, (c) = Cosigned By    Initials Name Provider Type    LAWRENCE Robertson PTA Physical Therapy Assistant                     Exercises     Row Name 07/24/18 0932             Subjective Comments    Subjective Comments She has been measured for her custom sleeve.  She also made an appointment with Dr. Ramos for her left shoulder.    -AL         Subjective Pain    Able to rate subjective pain? yes  -AL      Pre-Treatment Pain Level 6  -AL      Subjective Pain Comment Left shoulder  -AL         Total Minutes    09621 - PT Manual Therapy Minutes 74  -AL        User Key  (r) = Recorded By, (t) = Taken By, (c) = Cosigned By    Initials Name Provider Type    LAWRENCE Robertson PTA Physical Therapy Assistant                              PT OP Goals     Row Name 07/24/18 0947          PT Short Term Goals    STG Date to Achieve 05/29/18  -AL     STG 1 Patient demonstrate decreased net edema of  upper extremity >/= 10% for decreased risk of infection.   -AL     STG 1 Progress Ongoing  -AL     STG 1 Progress Comments Will measure next visit.  -AL     STG 2 Patient independent and compliant with self-massage techniques with spouse as needed for improved lymphatic drainage.  -AL     STG 2 Progress Ongoing;Met  -AL     STG 2 Progress Comments She is compliant with MLD  -AL     STG 3 Patient independent and compliant with initial home exercise program focused on deep breathing, range of motion for decreased edema and decreased risk of infection.   -AL     STG 3 Progress Ongoing;Progressing;Partially Met  -AL     STG 3 Progress Comments Compliant with HEp  -AL     STG 4 Patient independent and compliant with self-wrapping techniques of compression bandages with spouse for self-management of condition.   -AL     STG 4 Progress Partially Met;Ongoing  -AL     STG 4 Progress Comments Her  wraps the arm as needed.  -AL        Long Term Goals    LTG Date to Achieve 06/26/18  -AL     LTG 1 Patient independent and compliant with compression garments as indicated for self-management of condition.   -AL     LTG 1 Progress Ongoing  -AL     LTG 1 Progress Comments She has been measured for a new custom sleeve  -AL     LTG 2 Patient will have no s/s of celluliitis.   -AL     LTG 2 Progress Ongoing;Progressing  -AL     LTG 2 Progress Comments No s/s of cellulitis  -AL       User Key  (r) = Recorded By, (t) = Taken By, (c) = Cosigned By    Initials Name Provider Type    LAWRENCE Robertson PTA Physical Therapy Assistant          Therapy Education  Education Details: Try the long stretch bandage over the proximal arm where the OTS garment slips down.  Given: Edema management  Program: Progressed  How Provided: Verbal  Provided to: Patient  Level of Understanding: Verbalized              Time Calculation:   Start Time: 0925  Stop Time: 1039  Time Calculation (min): 74 min  Total Timed Code Minutes- PT: 74 minute(s)   Therapy  Suggested Charges     Code   Minutes Charges    86520 (CPT®) Hc Pt Neuromusc Re Education Ea 15 Min      40562 (CPT®) Hc Pt Ther Proc Ea 15 Min      53817 (CPT®) Hc Gait Training Ea 15 Min      84431 (CPT®) Hc Pt Therapeutic Act Ea 15 Min      81698 (CPT®) Hc Pt Manual Therapy Ea 15 Min 74 5    54409 (CPT®) Hc Pt Ther Massage- Per 15 Min      96158 (CPT®) Hc Pt Iontophoresis Ea 15 Min      95621 (CPT®) Hc Pt Elec Stim Ea-Per 15 Min      56850 (CPT®) Hc Pt Ultrasound Ea 15 Min      33940 (CPT®) Hc Pt Self Care/Mgmt/Train Ea 15 Min      Total  74 5        Therapy Charges for Today     Code Description Service Date Service Provider Modifiers Qty    42735769138 HC PT MANUAL THERAPY EA 15 MIN 7/24/2018 Divina Robertson, PTA GP 5                    Divina Robertson, PTA  7/24/2018

## 2018-07-26 ENCOUNTER — HOSPITAL ENCOUNTER (OUTPATIENT)
Dept: PHYSICAL THERAPY | Facility: HOSPITAL | Age: 63
Setting detail: THERAPIES SERIES
Discharge: HOME | End: 2018-07-26

## 2018-07-26 DIAGNOSIS — I97.2 POSTMASTECTOMY LYMPHEDEMA SYNDROME: Primary | ICD-10-CM

## 2018-07-26 PROCEDURE — 97140 MANUAL THERAPY 1/> REGIONS: CPT

## 2018-07-26 NOTE — THERAPY TREATMENT NOTE
Outpatient Physical Therapy Lymphedema Treatment Note  [facility]     Patient Name: Demi Morataya  : 1955  MRN: [id number]  Today's Date: 2018        Visit Date: 2018    Visit Dx:    ICD-10-CM ICD-9-CM   1. 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 yes  -AL      Pre-Treatment Pain Level 4  -AL      Subjective Pain Comment Left shoulder  -AL         Subjective Comments    Subjective Comments Her appointment is tomorrow with the Ortho Dr.  She is happy with the way her arm looks today.  She is not happy with the way strangers ask about her arm when patient is in public.  This is very upsetting to patient because of the negative comments people she doesn't know make.   -AL         Lymphedema Measurements    Measurement Type(s) Volumetric  -AL      Quick Girth Areas Upper extremities  -AL      Volumetric Areas Upper extremities  -AL      Volumetric UE Distance interval (cm) 5  -AL         LUE Quick Girth (cm)    Web space 20.8 cm  -AL      LUE Quick Girth Total 20.8  -AL         LUE Volumetric (cm)    Reference Point 0 18.5   wrist  -AL      5 24 cm  -AL      10 28.7 cm  -AL      15 33.5 cm  -AL      20 34.7 cm  -AL      25 35 cm   elbow  -AL      30 40.2 cm  -AL      35 42.2 cm  -AL      40 44.1 cm   axilla  -AL      LUE Volume Calculation- 5cm 4095.3  -AL         Manual Lymphatic Drainage    Manual Lymphatic Drainage initial sequence;opened regional lymph nodes;opened anastamoses;extremity treatment  -AL      Initial Sequence short neck;abdomen;diaphragmatic breathing  -AL      Supraclavicular left;right  -AL      Abdomen superficial;deep  -AL      Opened Regional Lymph Nodes axillary;inguinal  -AL      Inguinal left;right  -AL      Opened Anastamoses axillo-inguinal  -AL      Axillo-Inguinal left;right   -AL      Extremity Treatment MLD to full limb;extremity treatment focus on   left arm and trunk  -AL      MLD to Full Limb Supine and sidelying  -AL         Compression/Skin Care    Compression/Skin Care skin care;compression garment  -AL      Skin Care moisturizing lotion applied  -AL      Compression Garment Comments Donned the OTS Farrow for the L UE, placed chip bag on forearm, non-slip pad at proximal end of garment, then wrapped proximal portion of arm with coban.  -AL        User Key  (r) = Recorded By, (t) = Taken By, (c) = Cosigned By    Initials Name Provider Type    LAWRENCE Robertson PTA Physical Therapy Assistant                              PT Assessment/Plan     Row Name 07/26/18 0915          PT Assessment    Assessment Comments Her arm looked good today and was smaller than last week.  Patient needed a break form the wraps because comments strangers make about her wraps are upsetting to patient.  I used the non-slip pad and also Coband at the most proximal portion of the OTS garment.  Will see if this helps keep the garment in place better., as it tends to slip down.  -AL        PT Plan    PT Plan Comments Continue with POC  -AL       User Key  (r) = Recorded By, (t) = Taken By, (c) = Cosigned By    Initials Name Provider Type    LAWRENCE Robertson PTA Physical Therapy Assistant                     Exercises     Row Name 07/26/18 0915             Subjective Comments    Subjective Comments Her appointment is tomorrow with the Ortho Dr.  She is happy with the way her arm looks today.  She is not happy with the way strangers ask about her arm when patient is in public.  This is very upsetting to patient because of the negative comments people she doesn't know make.   -AL         Subjective Pain    Able to rate subjective pain? yes  -AL      Pre-Treatment Pain Level 4  -AL      Subjective Pain Comment Left shoulder  -AL         Total Minutes    34548 - PT Manual Therapy Minutes 75  -AL        User Key   (r) = Recorded By, (t) = Taken By, (c) = Cosigned By    Initials Name Provider Type    LAWRENCE Robertson PTA Physical Therapy Assistant                              PT OP Goals     Row Name 07/26/18 0915          PT Short Term Goals    STG Date to Achieve 05/29/18  -AL     STG 1 Patient demonstrate decreased net edema of upper extremity >/= 10% for decreased risk of infection.   -AL     STG 1 Progress Ongoing  -AL     STG 1 Progress Comments She has had a reduction since last week  -AL     STG 2 Patient independent and compliant with self-massage techniques with spouse as needed for improved lymphatic drainage.  -AL     STG 2 Progress Ongoing;Met  -AL     STG 3 Patient independent and compliant with initial home exercise program focused on deep breathing, range of motion for decreased edema and decreased risk of infection.   -AL     STG 3 Progress Ongoing;Progressing;Partially Met  -AL     STG 3 Progress Comments Compliant with HEP  -AL     STG 4 Patient independent and compliant with self-wrapping techniques of compression bandages with spouse for self-management of condition.   -AL     STG 4 Progress Partially Met;Ongoing  -AL     STG 4 Progress Comments  helps with wrapping  -AL        Long Term Goals    LTG Date to Achieve 06/26/18  -AL     LTG 1 Patient independent and compliant with compression garments as indicated for self-management of condition.   -AL     LTG 1 Progress Ongoing  -AL     LTG 1 Progress Comments She left with her OTS Farrow garment on today  -AL     LTG 2 Patient will have no s/s of celluliitis.   -AL     LTG 2 Progress Ongoing;Progressing  -AL     LTG 2 Progress Comments No s/s of cellulits  -AL        Time Calculation    PT Goal Re-Cert Due Date 08/16/18  -AL       User Key  (r) = Recorded By, (t) = Taken By, (c) = Cosigned By    Initials Name Provider Type    LAWRENCE Robertson PTA Physical Therapy Assistant          Therapy Education  Education Details: Use the non-slip pad and  coban with the OTS garment.  Given: Edema management  Program: New  How Provided: Verbal  Provided to: Patient  Level of Understanding: Verbalized              Time Calculation:   Start Time: 0915  Stop Time: 1030  Time Calculation (min): 75 min  Total Timed Code Minutes- PT: 75 minute(s)   Therapy Suggested Charges     Code   Minutes Charges    08910 (CPT®) Hc Pt Neuromusc Re Education Ea 15 Min      36794 (CPT®) Hc Pt Ther Proc Ea 15 Min      08845 (CPT®) Hc Gait Training Ea 15 Min      13486 (CPT®) Hc Pt Therapeutic Act Ea 15 Min      64634 (CPT®) Hc Pt Manual Therapy Ea 15 Min 75 5    53588 (CPT®) Hc Pt Ther Massage- Per 15 Min      45615 (CPT®) Hc Pt Iontophoresis Ea 15 Min      89063 (CPT®) Hc Pt Elec Stim Ea-Per 15 Min      13300 (CPT®) Hc Pt Ultrasound Ea 15 Min      35462 (CPT®) Hc Pt Self Care/Mgmt/Train Ea 15 Min      Total  75 5        Therapy Charges for Today     Code Description Service Date Service Provider Modifiers Qty    10419859648 HC PT MANUAL THERAPY EA 15 MIN 7/26/2018 Divina Robertson, PTA GP 5                    Divina Robertson, PTA  7/26/2018

## 2018-07-31 ENCOUNTER — APPOINTMENT (OUTPATIENT)
Dept: PHYSICAL THERAPY | Facility: HOSPITAL | Age: 63
End: 2018-07-31

## 2018-08-07 DIAGNOSIS — N20.0 KIDNEY STONE ON LEFT SIDE: Primary | ICD-10-CM

## 2018-08-14 ENCOUNTER — OFFICE VISIT (OUTPATIENT)
Dept: UROLOGY | Facility: CLINIC | Age: 63
End: 2018-08-14

## 2018-08-14 ENCOUNTER — HOSPITAL ENCOUNTER (OUTPATIENT)
Dept: PHYSICAL THERAPY | Facility: HOSPITAL | Age: 63
Setting detail: THERAPIES SERIES
Discharge: HOME | End: 2018-08-14

## 2018-08-14 ENCOUNTER — HOSPITAL ENCOUNTER (OUTPATIENT)
Dept: ULTRASOUND IMAGING | Facility: HOSPITAL | Age: 63
Discharge: HOME | End: 2018-08-14
Attending: UROLOGY | Admitting: UROLOGY

## 2018-08-14 VITALS — HEIGHT: 64 IN | WEIGHT: 215 LBS | TEMPERATURE: 97.4 F | BODY MASS INDEX: 36.7 KG/M2

## 2018-08-14 DIAGNOSIS — I97.2 POSTMASTECTOMY LYMPHEDEMA SYNDROME: Primary | ICD-10-CM

## 2018-08-14 DIAGNOSIS — N20.0 KIDNEY STONE ON LEFT SIDE: ICD-10-CM

## 2018-08-14 DIAGNOSIS — N20.0 KIDNEY STONE ON LEFT SIDE: Primary | ICD-10-CM

## 2018-08-14 LAB
BILIRUB BLD-MCNC: NEGATIVE MG/DL
CLARITY, POC: CLEAR
COLOR UR: YELLOW
GLUCOSE UR STRIP-MCNC: NEGATIVE MG/DL
KETONES UR QL: NEGATIVE
LEUKOCYTE EST, POC: NEGATIVE
NITRITE UR-MCNC: NEGATIVE MG/ML
PH UR: 6 [PH] (ref 5–8)
PROT UR STRIP-MCNC: NEGATIVE MG/DL
RBC # UR STRIP: NEGATIVE /UL
SP GR UR: 1.02 (ref 1–1.03)
UROBILINOGEN UR QL: NORMAL

## 2018-08-14 PROCEDURE — 99213 OFFICE O/P EST LOW 20 MIN: CPT | Performed by: UROLOGY

## 2018-08-14 PROCEDURE — 76775 US EXAM ABDO BACK WALL LIM: CPT

## 2018-08-14 PROCEDURE — 81001 URINALYSIS AUTO W/SCOPE: CPT | Performed by: UROLOGY

## 2018-08-14 PROCEDURE — 97140 MANUAL THERAPY 1/> REGIONS: CPT

## 2018-08-14 RX ORDER — LISINOPRIL 5 MG/1
5 TABLET ORAL DAILY
COMMUNITY
End: 2019-02-20 | Stop reason: SINTOL

## 2018-08-14 NOTE — PROGRESS NOTES
Ms. Morataya is 63 y.o. female    Chief Complaint   Patient presents with   • Nephrolithiasis       History of Present Illness  Recurrent Urolithiasis  Patient presents for recurrent urolithiasis. Severity is best defined as having approximately 0 stone episodes over the last 1 year(s). The most recent stone episode was approximately severalyear(s) ago. Anatomically, the patient has experienced left renal  calculi. Present stone burden is left renal calculus. Current symptoms/signs possible related to urolithiasis include none. Prevention management includes hydration.     The following portions of the patient's history were reviewed and updated as appropriate: allergies, current medications, past family history, past medical history, past social history, past surgical history and problem list.    Review of Systems   Constitutional: Negative for chills and fever.   Gastrointestinal: Negative for abdominal pain, anal bleeding, blood in stool, nausea and vomiting.   Genitourinary: Negative for decreased urine volume, difficulty urinating, dyspareunia, dysuria, enuresis, flank pain, frequency, genital sores, hematuria, menstrual problem, pelvic pain, urgency, vaginal bleeding, vaginal discharge and vaginal pain.         Current Outpatient Prescriptions:   •  clindamycin (CLEOCIN) 300 MG capsule, Take 1 capsule by mouth 4 (Four) Times a Day., Disp: 40 capsule, Rfl: 0  •  Ezetimibe (ZETIA PO), Take 10 mg by mouth Daily., Disp: , Rfl:   •  levothyroxine (SYNTHROID, LEVOTHROID) 50 MCG tablet, Take 50 mcg by mouth Daily., Disp: , Rfl:   •  lisinopril (PRINIVIL,ZESTRIL) 5 MG tablet, Take 5 mg by mouth Daily., Disp: , Rfl:   •  omeprazole (priLOSEC) 20 MG capsule, Take 20 mg by mouth Daily., Disp: , Rfl:   •  Vitamin D, Cholecalciferol, (CHOLECALCIFEROL) 400 UNITS tablet, Take 400 Units by mouth Daily., Disp: , Rfl:     Past Medical History:   Diagnosis Date   • Acid reflux    • Breast cancer (CMS/HCC)    • Disease of thyroid  "gland    • High cholesterol    • Hypertension    • Kidney stone    • PONV (postoperative nausea and vomiting)        Past Surgical History:   Procedure Laterality Date   • BONE CYST EXCISION Left 1981    CYST REMOVED FROM LEFT RING FINGER, GRAFT TAKEN FROM RIGHT HIP   • BREAST RECONSTRUCTION Bilateral 2010   • CHOLECYSTECTOMY  2005   • EXTRACORPOREAL SHOCK WAVE LITHOTRIPSY (ESWL) Left 7/31/2017    Procedure: EXTRACORPOREAL SHOCKWAVE LITHOTRIPSY;  Surgeon: Hollis Garcia MD;  Location: [facility] OR;  Service:    • HYSTERECTOMY  2001   • MASTECTOMY Bilateral 2008   • OTHER SURGICAL HISTORY  2005    SPHINCTER OF ODDI REPAIR        Social History     Social History   • Marital status:      Social History Main Topics   • Smoking status: Never Smoker   • Smokeless tobacco: Never Used   • Alcohol use No   • Drug use: No   • Sexual activity: Defer     Other Topics Concern   • Not on file       Family History   Problem Relation Age of Onset   • No Known Problems Father    • No Known Problems Mother        Objective    Temp 97.4 °F (36.3 °C)   Ht 162.6 cm (64\")   Wt 97.5 kg (215 lb)   BMI 36.90 kg/m²     Physical Exam    Admission on 11/17/2017, Discharged on 11/17/2017   Component Date Value Ref Range Status   • Glucose 11/17/2017 103* 70 - 100 mg/dL Final   • BUN 11/17/2017 15  5 - 21 mg/dL Final   • Creatinine 11/17/2017 0.86  0.50 - 1.40 mg/dL Final   • Sodium 11/17/2017 142  135 - 145 mmol/L Final   • Potassium 11/17/2017 4.2  3.5 - 5.3 mmol/L Final   • Chloride 11/17/2017 102  98 - 110 mmol/L Final   • CO2 11/17/2017 26.0  24.0 - 31.0 mmol/L Final   • Calcium 11/17/2017 9.2  8.4 - 10.4 mg/dL Final   • Total Protein 11/17/2017 7.3  6.3 - 8.7 g/dL Final   • Albumin 11/17/2017 4.30  3.50 - 5.00 g/dL Final   • ALT (SGPT) 11/17/2017 34  0 - 54 U/L Final   • AST (SGOT) 11/17/2017 26  7 - 45 U/L Final   • Alkaline Phosphatase 11/17/2017 82  24 - 120 U/L Final   • Total Bilirubin 11/17/2017 0.9  0.1 - 1.0 mg/dL " Final   • eGFR Non  Amer 11/17/2017 67  >60 mL/min/1.73 Final   • Globulin 11/17/2017 3.0  gm/dL Final   • A/G Ratio 11/17/2017 1.4  1.1 - 2.5 g/dL Final   • BUN/Creatinine Ratio 11/17/2017 17.4  7.0 - 25.0 Final   • Anion Gap 11/17/2017 14.0* 4.0 - 13.0 mmol/L Final   • Blood Culture 11/17/2017 No growth at 5 days   Final   • Lactate 11/17/2017 1.9  0.5 - 2.0 mmol/L Final   • WBC 11/17/2017 16.62* 4.80 - 10.80 10*3/mm3 Final   • RBC 11/17/2017 4.61  4.20 - 5.40 10*6/mm3 Final   • Hemoglobin 11/17/2017 12.9  12.0 - 16.0 g/dL Final   • Hematocrit 11/17/2017 39.7  37.0 - 47.0 % Final   • MCV 11/17/2017 86.1  82.0 - 98.0 fL Final   • MCH 11/17/2017 28.0  28.0 - 32.0 pg Final   • MCHC 11/17/2017 32.5* 33.0 - 36.0 g/dL Final   • RDW 11/17/2017 13.4  12.0 - 15.0 % Final   • RDW-SD 11/17/2017 42.2  40.0 - 54.0 fl Final   • MPV 11/17/2017 9.8  6.0 - 12.0 fL Final   • Platelets 11/17/2017 344  130 - 400 10*3/mm3 Final   • Neutrophil % 11/17/2017 83.6* 39.0 - 78.0 % Final   • Lymphocyte % 11/17/2017 9.0* 15.0 - 45.0 % Final   • Monocyte % 11/17/2017 6.2  4.0 - 12.0 % Final   • Eosinophil % 11/17/2017 0.8  0.0 - 4.0 % Final   • Basophil % 11/17/2017 0.2  0.0 - 2.0 % Final   • Immature Grans % 11/17/2017 0.2  0.0 - 5.0 % Final   • Neutrophils, Absolute 11/17/2017 13.90* 1.87 - 8.40 10*3/mm3 Final   • Lymphocytes, Absolute 11/17/2017 1.49  0.72 - 4.86 10*3/mm3 Final   • Monocytes, Absolute 11/17/2017 1.03  0.19 - 1.30 10*3/mm3 Final   • Eosinophils, Absolute 11/17/2017 0.13  0.00 - 0.70 10*3/mm3 Final   • Basophils, Absolute 11/17/2017 0.03  0.00 - 0.20 10*3/mm3 Final   • Immature Grans, Absolute 11/17/2017 0.04* 0.00 - 0.03 10*3/mm3 Final       Results for orders placed or performed in visit on 08/14/18   POC Urinalysis Dipstick, Multipro   Result Value Ref Range    Color Yellow Yellow, Straw, Dark Yellow, Noris    Clarity, UA Clear Clear    Glucose, UA Negative Negative, 1000 mg/dL (3+) mg/dL    Bilirubin Negative  Negative    Ketones, UA Negative Negative    Specific Gravity  1.025 1.005 - 1.030    Blood, UA Negative Negative    pH, Urine 6.0 5.0 - 8.0    Protein, POC Negative Negative mg/dL    Urobilinogen, UA Normal Normal    Nitrite, UA Negative Negative    Leukocytes Negative Negative     Assessment and Plan    Demi was seen today for nephrolithiasis.    Diagnoses and all orders for this visit:    Kidney stone on left side  -     POC Urinalysis Dipstick, Multipro  -     US Renal Bilateral; Future    I independently reviewed a renal ultrasound.  She has a new finding of a left-sided small calculus in the upper pole of the kidney that was not previously present.  We discussed treatment options for this and at this point she would like to continue to monitor.  I will see her back in 6 months with a renal ultrasound.    Ms. Morataya has been diagnosed with renal urolithiasis. The patient's options for therapy are discussed based on the size, location, stone burden, and symptoms.  The decision has been made to follow these stones radiographically without treatment.  The patient understands the risks associated with the conservative approach, but this is a reasonable treatment plan.  The need to contract me for hematuria, fever, recurrent UTI's, flank pain or change in ideology is explained.

## 2018-08-14 NOTE — THERAPY PROGRESS REPORT/RE-CERT
Outpatient Physical Therapy Lymphedema Progress Note   [facility]     Patient Name: Demi Morataya  : 1955  MRN: [id number]  Today's Date: 2018        Visit Date: 2018    Visit Dx:    ICD-10-CM ICD-9-CM   1. 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 yes  -AL      Pre-Treatment Pain Level 6  -AL      Post-Treatment Pain Level 5  -AL      Subjective Pain Comment L shoulder  -AL         Subjective Comments    Subjective Comments She states she has tendonitis in the left shoulder.  She is taking Aleeve for this. This makes the pain tolerable.  She has her new compression sleeve, she has worn the sleeve twice since then.  She thinks the wrist may be too tight because her hand gets puffy.  She is going to see if her hand goes down.  She may have to get another sleeve if the wrist is still too tight.  She uses the Farrow wrap as needed, it stays up better with the non-slip padding.   She went a day without compression, she is using the basic pump at home.   -AL         Lymphedema Measurements    Measurement Type(s) Volumetric  -AL      Quick Girth Areas Upper extremities  -AL      Volumetric Areas Upper extremities  -AL      Volumetric UE Distance interval (cm) 5  -AL         LUE Quick Girth (cm)    Web space 21 cm  -AL      LUE Quick Girth Total 21  -AL         LUE Volumetric (cm)    Reference Point 0 18.9  -AL      5 26 cm  -AL      10 31.4 cm  -AL      15 36 cm  -AL      20 35.9 cm  -AL      25 34.7 cm  -AL      30 39.6 cm  -AL      35 42.2 cm  -AL      40 45.1 cm  -AL         Manual Lymphatic Drainage    Manual Lymphatic Drainage initial sequence;opened regional lymph nodes;opened anastamoses;extremity treatment  -AL      Initial Sequence short neck;abdomen;diaphragmatic breathing  -AL      Supraclavicular  "left;right  -AL      Abdomen superficial;deep  -AL      Opened Regional Lymph Nodes axillary;inguinal  -AL      Inguinal left;right  -AL      Opened Anastamoses axillo-inguinal  -AL      Axillo-Inguinal left;right  -AL      Extremity Treatment MLD to full limb;extremity treatment focus on   left arm and trunk  -AL      MLD to Full Limb Supine and sidelying  -AL         Compression/Skin Care    Compression/Skin Care skin care;compression garment  -AL      Skin Care moisturizing lotion applied  -AL      Wrapping Location upper extremity  -AL      Wrapping Location UE left:;hand to axilla  -AL      Wrapping Comments 4\" stockinette, chip bag over forearm, rosidal soft for padding 1 roll. 6cm, 8cm figure 8, 10cm spiral.  Since her shoulder is not hurting as much today she did not want the shoulder support on.  -AL      Bandage Layers cotton liner;soft foam- 1/4 inch;short-stretch bandages (comment size/quantity)  -AL      Bandaging Technique circumferential/spiral  -AL        User Key  (r) = Recorded By, (t) = Taken By, (c) = Cosigned By    Initials Name Provider Type    Divina Flower PTA Physical Therapy Assistant                              PT Assessment/Plan     Row Name 08/14/18 1015          PT Assessment    Assessment Comments The Left arm is larger today, she did go one day without her compression.  She is using the basic pump amd not the advanced pump.  Talked to her about trying to use the advanced pump at least twice a week.  Will see if the wraps reduce the arm to the point where the sleeve will be a better fit.  If the sleeve still does not fit, patient will have to be re-measured.   -AL        PT Plan    PT Plan Comments Cotninue with CDT.  -AL       User Key  (r) = Recorded By, (t) = Taken By, (c) = Cosigned By    Initials Name Provider Type    Divina Flower PTA Physical Therapy Assistant                     Exercises     Row Name 08/14/18 1015             Subjective Comments    Subjective " Comments She states she has tendonitis in the left shoulder.  She is taking Aleeve for this. This makes the pain tolerable.  She has her new compression sleeve, she has worn the sleeve twice since then.  She thinks the wrist may be too tight because her hand gets puffy.  She is going to see if her hand goes down.  She may have to get another sleeve if the wrist is still too tight.  She uses the Farrow wrap as needed, it stays up better with the non-slip padding.   She went a day without compression, she is using the basic pump at home.   -AL         Subjective Pain    Able to rate subjective pain? yes  -AL      Pre-Treatment Pain Level 6  -AL      Post-Treatment Pain Level 5  -AL      Subjective Pain Comment L shoulder  -AL         Total Minutes    05707 - PT Manual Therapy Minutes 70  -AL        User Key  (r) = Recorded By, (t) = Taken By, (c) = Cosigned By    Initials Name Provider Type    Divina Flower, PTA Physical Therapy Assistant                              PT OP Goals     Row Name 08/14/18 1015          PT Short Term Goals    STG Date to Achieve 05/29/18  -AL     STG 1 Patient demonstrate decreased net edema of upper extremity >/= 10% for decreased risk of infection.   -AL     STG 1 Progress Ongoing  -AL     STG 1 Progress Comments She has had an increase in volume  -AL     STG 2 Patient independent and compliant with self-massage techniques with spouse as needed for improved lymphatic drainage.  -AL     STG 2 Progress Ongoing;Met  -AL     STG 3 Patient independent and compliant with initial home exercise program focused on deep breathing, range of motion for decreased edema and decreased risk of infection.   -AL     STG 3 Progress Ongoing;Progressing;Partially Met  -AL     STG 4 Patient independent and compliant with self-wrapping techniques of compression bandages with spouse for self-management of condition.   -AL     STG 4 Progress Partially Met;Ongoing  -AL     STG 4 Progress Comments Needs to wrap  more when arm swells  -AL        Long Term Goals    LTG Date to Achieve 06/26/18  -AL     LTG 1 Patient independent and compliant with compression garments as indicated for self-management of condition.   -AL     LTG 1 Progress Ongoing  -AL     LTG 1 Progress Comments She has a new glove, gauntlet, and custom sleeve.  The sleeve may be too tight at the wrist, she may have to be re-measured.  -AL     LTG 2 Patient will have no s/s of celluliitis.   -AL     LTG 2 Progress Ongoing;Progressing  -AL     LTG 2 Progress Comments No s/s of cullulits  -AL        Time Calculation    PT Goal Re-Cert Due Date 09/13/18  -AL       User Key  (r) = Recorded By, (t) = Taken By, (c) = Cosigned By    Initials Name Provider Type    Divina Flower, PTA Physical Therapy Assistant          Therapy Education  Education Details: Wear compression daily, use the pump daily, use the advanced pump at lease twice a week.  Given: Edema management  Program: Reinforced  How Provided: Verbal  Provided to: Patient  Level of Understanding: Verbalized              Time Calculation:   Start Time: 1015  Stop Time: 1125  Time Calculation (min): 70 min  Total Timed Code Minutes- PT: 70 minute(s)   Therapy Suggested Charges     Code   Minutes Charges    74242 (CPT®) Hc Pt Neuromusc Re Education Ea 15 Min      23071 (CPT®) Hc Pt Ther Proc Ea 15 Min      84362 (CPT®) Hc Gait Training Ea 15 Min      08281 (CPT®) Hc Pt Therapeutic Act Ea 15 Min      29231 (CPT®) Hc Pt Manual Therapy Ea 15 Min 70 5    39344 (CPT®) Hc Pt Ther Massage- Per 15 Min      09175 (CPT®) Hc Pt Iontophoresis Ea 15 Min      56778 (CPT®) Hc Pt Elec Stim Ea-Per 15 Min      34530 (CPT®) Hc Pt Ultrasound Ea 15 Min      43919 (CPT®) Hc Pt Self Care/Mgmt/Train Ea 15 Min      31864 (CPT®) Hc Pt Prosthetic (S) Train Initial Encounter, Each 15 Min      23664 (CPT®) Hc Orthotic(S) Mgmt/Train Initial Encounter, Each 15min      06761 (CPT®) Hc Pt Aquatic Therapy Ea 15 Min      21494 (CPT®) Hc Pt  Orthotic(S)/Prosthetic(S) Encounter, Each 15 Min      Total  70 5        Therapy Charges for Today     Code Description Service Date Service Provider Modifiers Qty    73170341649 HC PT MANUAL THERAPY EA 15 MIN 8/14/2018 Divina Robertson, PTA GP 5                    Divina Robertson, PTA  8/14/2018

## 2018-08-21 ENCOUNTER — HOSPITAL ENCOUNTER (OUTPATIENT)
Dept: PHYSICAL THERAPY | Facility: HOSPITAL | Age: 63
Setting detail: THERAPIES SERIES
Discharge: HOME | End: 2018-08-21

## 2018-08-21 DIAGNOSIS — I97.2 POSTMASTECTOMY LYMPHEDEMA SYNDROME: Primary | ICD-10-CM

## 2018-08-21 PROCEDURE — 97140 MANUAL THERAPY 1/> REGIONS: CPT

## 2018-08-21 NOTE — THERAPY TREATMENT NOTE
Outpatient Physical Therapy Lymphedema Treatment Note  [facility]     Patient Name: Demi Morataya  : 1955  MRN: [id number]  Today's Date: 2018        Visit Date: 2018    Visit Dx:    ICD-10-CM ICD-9-CM   1. 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 yes  -AL      Pre-Treatment Pain Level 0  -AL         Subjective Comments    Subjective Comments Her left shoulder is not hurting anymore.  She is consistently taking Aleeve and this helped.  The wrist is still getting a crease from the sleeve.  This makes the left hand puffy by the end of the day.  She is wrapping at night as needed, she uses the Farrow Wrap as needed.   -AL         Lymphedema Measurements    Measurement Type(s) Volumetric  -AL      Quick Girth Areas Upper extremities  -AL      Volumetric Areas Upper extremities  -AL      Volumetric UE Distance interval (cm) 5  -AL         LUE Quick Girth (cm)    Web space 20 cm  -AL      LUE Quick Girth Total 20  -AL         LUE Volumetric (cm)    Reference Point 0 18.2   Wrist crease  -AL      5 24.5 cm  -AL      10 31.2 cm  -AL      15 34.6 cm  -AL      20 36.2 cm  -AL      25 34 cm   Elbow crease  -AL      30 41 cm  -AL      35 42.6 cm  -AL      40 43.7 cm   Axilla  -AL      LUE Volume Calculation- 5cm 4234.6  -AL         Manual Lymphatic Drainage    Manual Lymphatic Drainage initial sequence;opened regional lymph nodes;opened anastamoses;extremity treatment  -AL      Initial Sequence short neck;abdomen;diaphragmatic breathing  -AL      Supraclavicular left;right  -AL      Abdomen superficial;deep  -AL      Opened Regional Lymph Nodes axillary;inguinal  -AL      Inguinal left;right  -AL      Opened Anastamoses axillo-inguinal  -AL      Axillo-Inguinal left;right  -AL      Extremity Treatment MLD to full  limb;extremity treatment focus on   left arm and trunk  -AL      MLD to Full Limb Supine and sidelying  -AL         Compression/Skin Care    Compression Garment Comments She donned her compression sleeve.  She is going to [place] to be measured for another sleeve.   -AL        User Key  (r) = Recorded By, (t) = Taken By, (c) = Cosigned By    Initials Name Provider Type    Divina Flower PTA Physical Therapy Assistant                              PT Assessment/Plan     Row Name 08/21/18 0930          PT Assessment    Assessment Comments The left arm is better today, the hand and wrist are both down in size.  She is still having a crease as the wrist and hand swelling by the end of the day.  She is going to go to [place] to be re-measured for a custom sleeve.   -AL        PT Plan    PT Plan Comments Will plan to d/c this patient.   -AL       User Key  (r) = Recorded By, (t) = Taken By, (c) = Cosigned By    Initials Name Provider Type    Divina Flower PTA Physical Therapy Assistant                     Exercises     Row Name 08/21/18 0930             Subjective Comments    Subjective Comments Her left shoulder is not hurting anymore.  She is consistently taking Aleeve and this helped.  The wrist is still getting a crease from the sleeve.  This makes the left hand puffy by the end of the day.  She is wrapping at night as needed, she uses the Farrow Wrap as needed.   -AL         Subjective Pain    Able to rate subjective pain? yes  -AL      Pre-Treatment Pain Level 0  -AL         Total Minutes    20958 - PT Manual Therapy Minutes 75  -AL        User Key  (r) = Recorded By, (t) = Taken By, (c) = Cosigned By    Initials Name Provider Type    Divina Flower PTA Physical Therapy Assistant                              PT OP Goals     Row Name 08/21/18 0930          PT Short Term Goals    STG Date to Achieve 05/29/18  -AL     STG 1 Patient demonstrate decreased net edema of upper  extremity >/= 10% for decreased risk of infection.   -AL     STG 1 Progress Met  -AL     STG 1 Progress Comments She has had a decrease of 11%  -AL     STG 2 Patient independent and compliant with self-massage techniques with spouse as needed for improved lymphatic drainage.  -AL     STG 2 Progress Met  -AL     STG 2 Progress Comments She is compliant with self MLD  -AL     STG 3 Patient independent and compliant with initial home exercise program focused on deep breathing, range of motion for decreased edema and decreased risk of infection.   -AL     STG 3 Progress Met  -AL     STG 3 Progress Comments Compliant with HEP  -AL     STG 4 Patient independent and compliant with self-wrapping techniques of compression bandages with spouse for self-management of condition.   -AL     STG 4 Progress Met  -AL     STG 4 Progress Comments  helps wrap the arm as needed.  -AL        Long Term Goals    LTG Date to Achieve 06/26/18  -AL     LTG 1 Patient independent and compliant with compression garments as indicated for self-management of condition.   -AL     LTG 1 Progress Partially Met  -AL     LTG 1 Progress Comments She has the Farrow wrap to wear, she needs to be re-meausred for a custom sleeve.  The one she has now causes a crease at the wrist which then causes her hand to swell.  -AL     LTG 2 Patient will have no s/s of celluliitis.   -AL     LTG 2 Progress Met  -AL        Time Calculation    PT Goal Re-Cert Due Date 09/13/18  -AL       User Key  (r) = Recorded By, (t) = Taken By, (c) = Cosigned By    Initials Name Provider Type    Divina Flower, PTA Physical Therapy Assistant          Therapy Education  Education Details: Continue with CDT, be re-measured for a custom sleeve.  Given: Edema management  Program: Reinforced  How Provided: Verbal  Provided to: Patient  Level of Understanding: Verbalized              Time Calculation:   Start Time: 0930  Stop Time: 1045  Time Calculation (min): 75 min  Total Timed  Code Minutes- PT: 75 minute(s)   Therapy Suggested Charges     Code   Minutes Charges    59492 (CPT®) Hc Pt Neuromusc Re Education Ea 15 Min      48093 (CPT®) Hc Pt Ther Proc Ea 15 Min      60902 (CPT®) Hc Gait Training Ea 15 Min      97939 (CPT®) Hc Pt Therapeutic Act Ea 15 Min      61363 (CPT®) Hc Pt Manual Therapy Ea 15 Min 75 5    26573 (CPT®) Hc Pt Ther Massage- Per 15 Min      84664 (CPT®) Hc Pt Iontophoresis Ea 15 Min      02797 (CPT®) Hc Pt Elec Stim Ea-Per 15 Min      99940 (CPT®) Hc Pt Ultrasound Ea 15 Min      05610 (CPT®) Hc Pt Self Care/Mgmt/Train Ea 15 Min      24060 (CPT®) Hc Pt Prosthetic (S) Train Initial Encounter, Each 15 Min      21524 (CPT®) Hc Orthotic(S) Mgmt/Train Initial Encounter, Each 15min      58414 (CPT®) Hc Pt Aquatic Therapy Ea 15 Min      63239 (CPT®) Hc Pt Orthotic(S)/Prosthetic(S) Encounter, Each 15 Min      Total  75 5        Therapy Charges for Today     Code Description Service Date Service Provider Modifiers Qty    [id number] HC PT MANUAL THERAPY EA 15 MIN 8/21/2018 Divina Robertson, PTA GP 5                    Divina Robertson PTA  8/21/2018

## 2018-09-26 PROBLEM — Z12.11 SCREENING FOR COLON CANCER: Status: RESOLVED | Noted: 2017-04-26 | Resolved: 2018-09-26

## 2018-12-20 ENCOUNTER — DOCUMENTATION (OUTPATIENT)
Dept: PHYSICAL THERAPY | Facility: HOSPITAL | Age: 63
End: 2018-12-20

## 2018-12-20 DIAGNOSIS — I97.2 POSTMASTECTOMY LYMPHEDEMA SYNDROME: Primary | ICD-10-CM

## 2018-12-20 NOTE — THERAPY DISCHARGE NOTE
Outpatient Physical Therapy Lymphedema Treatment Note/Discharge Summary       Patient Name: Demi Morataya  : 1955  MRN: [id number]  Today's Date: 2018      Visit Date: 2018    Visit Dx:    ICD-10-CM ICD-9-CM   1. 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   -AL     STG 1 Progress  Met  -AL     STG 2  Patient independent and compliant with self-massage techniques with spouse as needed for improved lymphatic drainage.  -AL     STG 2 Progress  Met  -AL     STG 3  Patient independent and compliant with initial home exercise program focused on deep breathing, range of motion for decreased edema and decreased risk of infection.   -AL     STG 3 Progress  Met  -AL     STG 4  Patient independent and compliant with self-wrapping techniques of compression bandages with spouse for self-management of condition.   -AL     STG 4 Progress  Met  -AL        Long Term Goals    LTG Date to Achieve  18  -AL     LTG 1  Patient independent and compliant with compression garments as indicated for self-management of condition.   -AL     LTG 1 Progress  Partially Met  -AL     LTG 1 Progress Comments  She had the Farrow wrap at her last visit and was going to be re-measured for a new custon compression sleeve.  -AL     LTG 2  Patient will have no s/s of celluliitis.   -AL     LTG 2 Progress  Met  -AL       User Key  (r) = Recorded By, (t) = Taken By, (c) = Cosigned By    Initials Name Provider Type    Divina Flower, PTA Physical Therapy Assistant                         Time Calculation:       Therapy Suggested Charges     Code   Minutes Charges    None                       OP PT Discharge Summary  Date of Discharge: 12/20/18  Reason for Discharge: Maximum functional potential achieved  Outcomes Achieved: Patient able to partially acheive established goals  Discharge Destination: Home with home program  Discharge Instructions/Additional Comments: All goals met except for being measured for a new custom compression sleeve.  Will d/c this patient.       Divina Robertson, PTA  12/20/2018

## 2019-02-20 ENCOUNTER — OFFICE VISIT (OUTPATIENT)
Dept: UROLOGY | Facility: CLINIC | Age: 64
End: 2019-02-20

## 2019-02-20 ENCOUNTER — HOSPITAL ENCOUNTER (OUTPATIENT)
Dept: ULTRASOUND IMAGING | Facility: HOSPITAL | Age: 64
Discharge: HOME | End: 2019-02-20
Admitting: UROLOGY

## 2019-02-20 VITALS — TEMPERATURE: 98 F | HEIGHT: 64 IN | BODY MASS INDEX: 36.7 KG/M2 | WEIGHT: 215 LBS

## 2019-02-20 DIAGNOSIS — N20.0 KIDNEY STONE ON LEFT SIDE: ICD-10-CM

## 2019-02-20 DIAGNOSIS — N20.0 KIDNEY STONE ON LEFT SIDE: Primary | ICD-10-CM

## 2019-02-20 PROCEDURE — 81003 URINALYSIS AUTO W/O SCOPE: CPT | Performed by: UROLOGY

## 2019-02-20 PROCEDURE — 99212 OFFICE O/P EST SF 10 MIN: CPT | Performed by: UROLOGY

## 2019-02-20 PROCEDURE — 76775 US EXAM ABDO BACK WALL LIM: CPT

## 2019-02-20 RX ORDER — BISOPROLOL FUMARATE 5 MG/1
5 TABLET, FILM COATED ORAL 2 TIMES DAILY
COMMUNITY

## 2019-02-20 NOTE — PATIENT INSTRUCTIONS

## 2019-02-20 NOTE — PROGRESS NOTES
Ms. Morataya is 63 y.o. female    Chief Complaint   Patient presents with   • Nephrolithiasis       History of Present Illness  Patient with a history of stones with a history of ESWL.  She denies any flank pain fevers chills nausea vomiting over the last 6 months.  Denies any stone episodes.      The following portions of the patient's history were reviewed and updated as appropriate: allergies, current medications, past family history, past medical history, past social history, past surgical history and problem list.    Review of Systems   Constitutional: Negative for chills and fever.   Gastrointestinal: Negative for abdominal pain, anal bleeding, blood in stool, nausea and vomiting.   Genitourinary: Positive for flank pain (bilateral). Negative for decreased urine volume, difficulty urinating, dyspareunia, dysuria, enuresis, frequency, genital sores, hematuria, menstrual problem, pelvic pain, urgency, vaginal bleeding, vaginal discharge and vaginal pain.       I have reviewed the review of systems      Current Outpatient Medications:   •  bisoprolol (ZEBeta) 5 MG tablet, Take 5 mg by mouth Daily., Disp: , Rfl:   •  Ezetimibe (ZETIA PO), Take 10 mg by mouth Daily., Disp: , Rfl:   •  levothyroxine (SYNTHROID, LEVOTHROID) 50 MCG tablet, Take 50 mcg by mouth Daily., Disp: , Rfl:   •  omeprazole (priLOSEC) 20 MG capsule, Take 20 mg by mouth Daily., Disp: , Rfl:   •  Vitamin D, Cholecalciferol, (CHOLECALCIFEROL) 400 UNITS tablet, Take 400 Units by mouth Daily., Disp: , Rfl:     Past Medical History:   Diagnosis Date   • Acid reflux    • Breast cancer (CMS/HCC)    • Disease of thyroid gland    • High cholesterol    • Hypertension    • Kidney stone    • PONV (postoperative nausea and vomiting)        Past Surgical History:   Procedure Laterality Date   • BONE CYST EXCISION Left 1981    CYST REMOVED FROM LEFT RING FINGER, GRAFT TAKEN FROM RIGHT HIP   • BREAST RECONSTRUCTION Bilateral 2010   • CHOLECYSTECTOMY  2005   •  "EXTRACORPOREAL SHOCK WAVE LITHOTRIPSY (ESWL) Left 7/31/2017    Procedure: EXTRACORPOREAL SHOCKWAVE LITHOTRIPSY;  Surgeon: Hollis Garcia MD;  Location: [facility];  Service:    • HYSTERECTOMY  2001   • MASTECTOMY Bilateral 2008   • OTHER SURGICAL HISTORY  2005    SPHINCTER OF ODDI REPAIR        Social History     Socioeconomic History   • Marital status:      Spouse name: Not on file   • Number of children: Not on file   • Years of education: Not on file   • Highest education level: Not on file   Tobacco Use   • Smoking status: Never Smoker   • Smokeless tobacco: Never Used   Substance and Sexual Activity   • Alcohol use: No   • Drug use: No   • Sexual activity: Defer       Family History   Problem Relation Age of Onset   • No Known Problems Father    • No Known Problems Mother        Objective    Temp 98 °F (36.7 °C)   Ht 162.6 cm (64\")   Wt 97.5 kg (215 lb)   BMI 36.90 kg/m²     Physical Exam    Office Visit on 08/14/2018   Component Date Value Ref Range Status   • Color 08/14/2018 Yellow  Yellow, Straw, Dark Yellow, Noris Final   • Clarity, UA 08/14/2018 Clear  Clear Final   • Glucose, UA 08/14/2018 Negative  Negative, 1000 mg/dL (3+) mg/dL Final   • Bilirubin 08/14/2018 Negative  Negative Final   • Ketones, UA 08/14/2018 Negative  Negative Final   • Specific Gravity  08/14/2018 1.025  1.005 - 1.030 Final   • Blood, UA 08/14/2018 Negative  Negative Final   • pH, Urine 08/14/2018 6.0  5.0 - 8.0 Final   • Protein, POC 08/14/2018 Negative  Negative mg/dL Final   • Urobilinogen, UA 08/14/2018 Normal  Normal Final   • Nitrite, UA 08/14/2018 Negative  Negative Final   • Leukocytes 08/14/2018 Negative  Negative Final       Results for orders placed or performed in visit on 02/20/19   POC Urinalysis Dipstick, Multipro   Result Value Ref Range    Color Yellow Yellow, Straw, Dark Yellow, Noris    Clarity, UA Clear Clear    Glucose, UA Negative Negative, 1000 mg/dL (3+) mg/dL    Bilirubin Negative Negative    " Ketones, UA Negative Negative    Specific Gravity  1.020 1.005 - 1.030    Blood, UA Negative Negative    pH, Urine 6.5 5.0 - 8.0    Protein, POC Negative Negative mg/dL    Urobilinogen, UA Normal Normal    Nitrite, UA Negative Negative    Leukocytes Negative Negative     Patient's Body mass index is 36.9 kg/m². BMI is above normal parameters. Recommendations include: educational material.    Assessment and Plan    Demi was seen today for nephrolithiasis.    Diagnoses and all orders for this visit:    Kidney stone on left side  -     POC Urinalysis Dipstick, Multipro    I reviewed the renal ultrasound.  She does still have a small calcification in the left upper pole.  This is unchanged from her previous ultrasound.  She does note this likely represents a stone at this point she would like to continue to monitor.  I did give her the choice of a renal ultrasound in 1 year to assess the size of the stone versus coming to see me if she has any problems in the future.  She would like to just come back to see me as needed.  She understands the signs and symptoms of an obstructing stone will call me in the future.    Ms. Morataya has been diagnosed with renal urolithiasis. The patient's options for therapy are discussed based on the size, location, stone burden, and symptoms.  The decision has been made to follow these stones radiographically without treatment.  The patient understands the risks associated with the conservative approach, but this is a reasonable treatment plan.  The need to contract me for hematuria, fever, recurrent UTI's, flank pain or change in ideology is explained.

## 2019-04-01 ENCOUNTER — OFFICE VISIT (OUTPATIENT)
Dept: OBGYN | Age: 64
End: 2019-04-01
Payer: MEDICARE

## 2019-04-01 VITALS
WEIGHT: 243 LBS | HEIGHT: 64 IN | SYSTOLIC BLOOD PRESSURE: 128 MMHG | HEART RATE: 58 BPM | DIASTOLIC BLOOD PRESSURE: 67 MMHG | TEMPERATURE: 98.7 F | BODY MASS INDEX: 41.48 KG/M2

## 2019-04-01 DIAGNOSIS — N95.2 VAGINAL ATROPHY: ICD-10-CM

## 2019-04-01 DIAGNOSIS — Z13.820 SCREENING FOR OSTEOPOROSIS: ICD-10-CM

## 2019-04-01 DIAGNOSIS — Z11.51 SCREENING FOR HPV (HUMAN PAPILLOMAVIRUS): ICD-10-CM

## 2019-04-01 DIAGNOSIS — N95.1 MENOPAUSAL STATE: Primary | ICD-10-CM

## 2019-04-01 DIAGNOSIS — Z12.72 SCREENING FOR VAGINAL CANCER: ICD-10-CM

## 2019-04-01 DIAGNOSIS — K60.2 PERINEAL FISSURE: ICD-10-CM

## 2019-04-01 PROBLEM — K21.9 CHRONIC GERD: Status: ACTIVE | Noted: 2019-04-01

## 2019-04-01 PROBLEM — K22.70 BARRETT'S ESOPHAGUS DETERMINED BY BIOPSY: Status: ACTIVE | Noted: 2019-04-01

## 2019-04-01 PROCEDURE — G8427 DOCREV CUR MEDS BY ELIG CLIN: HCPCS | Performed by: NURSE PRACTITIONER

## 2019-04-01 PROCEDURE — 3017F COLORECTAL CA SCREEN DOC REV: CPT | Performed by: NURSE PRACTITIONER

## 2019-04-01 PROCEDURE — 1036F TOBACCO NON-USER: CPT | Performed by: NURSE PRACTITIONER

## 2019-04-01 PROCEDURE — 99214 OFFICE O/P EST MOD 30 MIN: CPT | Performed by: NURSE PRACTITIONER

## 2019-04-01 PROCEDURE — G8417 CALC BMI ABV UP PARAM F/U: HCPCS | Performed by: NURSE PRACTITIONER

## 2019-04-01 RX ORDER — BISOPROLOL FUMARATE 5 MG/1
10 TABLET ORAL DAILY
COMMUNITY
Start: 2019-03-18 | End: 2022-06-27

## 2019-04-01 RX ORDER — CLOBETASOL PROPIONATE 0.5 MG/G
CREAM TOPICAL
Qty: 1 TUBE | Refills: 2 | Status: SHIPPED | OUTPATIENT
Start: 2019-04-01 | End: 2020-10-23

## 2019-04-01 ASSESSMENT — ENCOUNTER SYMPTOMS
VOICE CHANGE: 0
SORE THROAT: 0
CHEST TIGHTNESS: 0
BACK PAIN: 0
GASTROINTESTINAL NEGATIVE: 1
SHORTNESS OF BREATH: 0
DIARRHEA: 0
RESPIRATORY NEGATIVE: 1
EYES NEGATIVE: 1
COUGH: 0
RECTAL PAIN: 0
CONSTIPATION: 0
VOMITING: 0
BLOOD IN STOOL: 0
NAUSEA: 0
ABDOMINAL DISTENTION: 0

## 2019-04-01 NOTE — PATIENT INSTRUCTIONS
Patient Education        Well Visit, Ages 25 to 48: Care Instructions  Your Care Instructions    Physical exams can help you stay healthy. Your doctor has checked your overall health and may have suggested ways to take good care of yourself. He or she also may have recommended tests. At home, you can help prevent illness with healthy eating, regular exercise, and other steps. Follow-up care is a key part of your treatment and safety. Be sure to make and go to all appointments, and call your doctor if you are having problems. It's also a good idea to know your test results and keep a list of the medicines you take. How can you care for yourself at home? · Reach and stay at a healthy weight. This will lower your risk for many problems, such as obesity, diabetes, heart disease, and high blood pressure. · Get at least 30 minutes of physical activity on most days of the week. Walking is a good choice. You also may want to do other activities, such as running, swimming, cycling, or playing tennis or team sports. Discuss any changes in your exercise program with your doctor. · Do not smoke or allow others to smoke around you. If you need help quitting, talk to your doctor about stop-smoking programs and medicines. These can increase your chances of quitting for good. · Talk to your doctor about whether you have any risk factors for sexually transmitted infections (STIs). Having one sex partner (who does not have STIs and does not have sex with anyone else) is a good way to avoid these infections. · Use birth control if you do not want to have children at this time. Talk with your doctor about the choices available and what might be best for you. · Protect your skin from too much sun. When you're outdoors from 10 a.m. to 4 p.m., stay in the shade or cover up with clothing and a hat with a wide brim. Wear sunglasses that block UV rays.  Even when it's cloudy, put broad-spectrum sunscreen (SPF 30 or higher) on any exposed skin. · See a dentist one or two times a year for checkups and to have your teeth cleaned. · Wear a seat belt in the car. · Drink alcohol in moderation, if at all. That means no more than 2 drinks a day for men and 1 drink a day for women. Follow your doctor's advice about when to have certain tests. These tests can spot problems early. For everyone  · Cholesterol. Have the fat (cholesterol) in your blood tested after age 21. Your doctor will tell you how often to have this done based on your age, family history, or other things that can increase your risk for heart disease. · Blood pressure. Have your blood pressure checked during a routine doctor visit. Your doctor will tell you how often to check your blood pressure based on your age, your blood pressure results, and other factors. · Vision. Talk with your doctor about how often to have a glaucoma test.  · Diabetes. Ask your doctor whether you should have tests for diabetes. · Colon cancer. Have a test for colon cancer at age 48. You may have one of several tests. If you are younger than 48, you may need a test earlier if you have any risk factors. Risk factors include whether you already had a precancerous polyp removed from your colon or whether your parent, brother, sister, or child has had colon cancer. For women  · Breast exam and mammogram. Talk to your doctor about when you should have a clinical breast exam and a mammogram. Medical experts differ on whether and how often women under 50 should have these tests. Your doctor can help you decide what is right for you. · Pap test and pelvic exam. Begin Pap tests at age 24. A Pap test is the best way to find cervical cancer. The test often is part of a pelvic exam. Ask how often to have this test.  · Tests for sexually transmitted infections (STIs). Ask whether you should have tests for STIs.  You may be at risk if you have sex with more than one person, especially if your partners do not wear condoms. For men  · Tests for sexually transmitted infections (STIs). Ask whether you should have tests for STIs. You may be at risk if you have sex with more than one person, especially if you do not wear a condom. · Testicular cancer exam. Ask your doctor whether you should check your testicles regularly. · Prostate exam. Talk to your doctor about whether you should have a blood test (called a PSA test) for prostate cancer. Experts differ on whether and when men should have this test. Some experts suggest it if you are older than 39 and are -American or have a father or brother who got prostate cancer when he was younger than 72. When should you call for help? Watch closely for changes in your health, and be sure to contact your doctor if you have any problems or symptoms that concern you. Where can you learn more? Go to https://Convoretomaseb.health19pay. org and sign in to your [employer] account. Enter P072 in the [employer] box to learn more about \"Well Visit, Ages 25 to 48: Care Instructions. \"     If you do not have an account, please click on the \"Sign Up Now\" link. Current as of: March 28, 2018  Content Version: 11.9  © 9823-0865 [employer]. Care instructions adapted under license by [facility] [address] If you have questions about a medical condition or this instruction, always ask your healthcare professional. [place] [address] any warranty or liability for your use of this information. Patient Education        Human Papillomavirus (HPV): Care Instructions  Your Care Instructions  The human papillomavirus (HPV) is a very common virus. There are many types of HPV. Some types cause the common skin wart. Other types cause genital warts, which can be spread by sexual contact. Some types can increase the risk for cervical and anal cancer. Having one type of HPV does not lead to having another type.   Many women who have HPV may not know that they are infected until it is found with a Pap test. Your doctor uses this test to look for abnormal cells on your cervix. If you have had an abnormal Pap test, your doctor may recommend that you have an HPV test.  Like a Pap test, an HPV test is done on a sample of cells collected from the cervix. If the test finds that you have the types of HPV that might lead to cancer, your doctor may suggest more tests. This does not mean that you will develop cancer; it means that you may have an increased risk. Abnormal cell changes caused by HPV often go away on their own. If the changes do not go away, they can be treated. But because HPV can stay inside the body, the abnormal cervical cells sometimes come back. This is why it is important to follow up with your doctor and have regular Pap tests. Follow-up care is a key part of your treatment and safety. Be sure to make and go to all appointments, and call your doctor if you are having problems. It's also a good idea to know your test results and keep a list of the medicines you take. How can you care for yourself at home? · If you are going to have a Pap or HPV test, do not douche or use tampons or vaginal creams in the 24 hours before the test.  · Do not smoke. Smoking increases the risk for cervical problems and abnormal Pap tests. If you need help quitting, talk to your doctor about stop-smoking programs and medicines. These can increase your chances of quitting for good. · Use latex condoms every time you have sex. Use them from the beginning to the end of sexual contact. · Be sure to tell your sexual partner or partners that you have HPV. Even if you do not have symptoms, you can still pass HPV to others. · Having one sex partner (who does not have STIs and does not have sex with anyone else) is a good way to avoid STIs. When should you call for help?   Watch closely for changes in your health, and be sure to contact your doctor if:    · You have vaginal pain during or after sex.     · You have vaginal bleeding when you are not in your menstrual period. Where can you learn more? Go to https://chpeflipeweb.health-partners. org and sign in to your [employer] account. Enter F690 in the [employer] box to learn more about \"Human Papillomavirus (HPV): Care Instructions. \"     If you do not have an account, please click on the \"Sign Up Now\" link. Current as of: September 11, 2018  Content Version: 11.9  © 4000-8016 [employer]. Care instructions adapted under license by [facility] ([facility]). If you have questions about a medical condition or this instruction, always ask your healthcare professional. [address] any warranty or liability for your use of this information.

## 2019-04-01 NOTE — PROGRESS NOTES
Peterson Ba is a 66 y.o. female who presents today for her medical conditions/ complaints as noted below. Peterson Ba is c/o of Annual Exam        HPI  Pt presents today for gyn exam. Hx of breast cancer with mastectomy and follow in [address] [place] [address] so no breast exam today. Has dryness to perineum. Feels like at times it tears even. Last mammogram:  mastectomy  Last pap smear:  2017  Contraception:  hysterectomy  :    Para:    AB:    Last bone density:  2018  Last colonoscopy: - jeremías  No LMP recorded. Patient has had a hysterectomy. No obstetric history on file.     Past Medical History:   Diagnosis Date    Acid reflux     Breast cancer ([address]     Dysplasia of cervix     Hyperlipidemia     Hypertension     Thyroid disease      Past Surgical History:   Procedure Laterality Date    BREAST RECONSTRUCTION      BREAST RECONSTRUCTION      BREAST RECONSTRUCTION      CHOLECYSTECTOMY  2006    COLONOSCOPY  2011    Pt will see Dr. Jonas 2017   [address] [address]   [address] with retained ovaries    MASTECTOMY, BILATERAL      OTHER SURGICAL HISTORY  2006    Sphincter ODODDI    RI COLONOSCOPY FLX DX W/COLLJ SPEC WHEN PFRMD N/A 2017    Dr Mart-diverticulosis, 5 yr recall    RI EGD TRANSORAL BIOPSY SINGLE/MULTIPLE N/A 2018    Dr Jonas March (+) Reza's (-) dysplasia-Zuri (-)--1 yr recall     Family History   Problem Relation Age of Onset    Colon Cancer Neg Hx     Colon Polyps Neg Hx     Liver Cancer Neg Hx     Liver Disease Neg Hx     Esophageal Cancer Neg Hx     Rectal Cancer Neg Hx     Stomach Cancer Neg Hx      Social History     Tobacco Use    Smoking status: Never Smoker    Smokeless tobacco: Never Used   Substance Use Topics    Alcohol use: Yes     Comment: rarely       Current Outpatient Medications   Medication Sig Dispense Refill    bisoprolol (ZEBETA) 5 MG tablet       clobetasol (TEMOVATE) 0.05 % cream Apply topically 2 times daily. 1 Tube 2    pantoprazole (PROTONIX) 40 MG tablet Take 1 tablet by mouth daily Take daily first thing in the morning on an empty stomach. 90 tablet 3    ezetimibe (ZETIA) 10 MG tablet Take 10 mg by mouth daily      levothyroxine (SYNTHROID) 50 MCG tablet Take 50 mcg by mouth Daily      Cholecalciferol (VITAMIN D3) 2000 UNITS CAPS Take by mouth      aspirin 81 MG tablet Take 81 mg by mouth daily      hyoscyamine (LEVSIN/SL) 125 MCG sublingual tablet Place 1 tablet under the tongue every 4 hours as needed for Cramping 360 tablet 3    lisinopril (PRINIVIL;ZESTRIL) 5 MG tablet       escitalopram (LEXAPRO) 10 MG tablet       magnesium gluconate (MAGONATE) 500 MG tablet Take 500 mg by mouth 2 times daily       No current facility-administered medications for this visit. Allergies   Allergen Reactions    Neosporin [Neomycin-Polymyxin-Gramicidin]     Penicillins     Sulfa Antibiotics     Zithromax [Azithromycin]      Vitals:    04/01/19 1304   BP: 128/67   Pulse: 58   Temp: 98.7 °F (37.1 °C)     Body mass index is 41.71 kg/m². Review of Systems   Constitutional: Negative. HENT: Negative. Eyes: Negative. Respiratory: Negative. Cardiovascular: Negative. Gastrointestinal: Negative. Genitourinary: Positive for vaginal pain (perineal). Negative for difficulty urinating, dyspareunia, dysuria, enuresis, frequency, hematuria, menstrual problem, pelvic pain, urgency and vaginal discharge. Musculoskeletal: Negative. Skin: Negative. Neurological: Negative. Psychiatric/Behavioral: Negative. Physical Exam   Constitutional: She is oriented to person, place, and time. She appears well-developed and well-nourished. No distress. HENT:   Head: Normocephalic and atraumatic. Right Ear: External ear normal.   Left Ear: External ear normal.   Nose: Nose normal.   Eyes: Pupils are equal, round, and reactive to light.  Conjunctivae and lids are normal.   Neck: Normal range of motion. Neck supple. No tracheal deviation present. No thyroid mass and no thyromegaly present. Cardiovascular: Normal rate, regular rhythm and normal heart sounds. Pulmonary/Chest: Effort normal and breath sounds normal. No respiratory distress. Abdominal: Soft. Bowel sounds are normal. She exhibits no mass. There is no tenderness. Genitourinary:       There is no rash, tenderness, lesion or injury on the right labia. There is no rash, tenderness, lesion or injury on the left labia. Right adnexum displays no mass, no tenderness and no fullness. Left adnexum displays no mass, no tenderness and no fullness. There is erythema (atrophic) in the vagina. No tenderness in the vagina. No vaginal discharge found. Genitourinary Comments: Pap smear obtained. Vaginal cuff intact. Uterus and cervix surgically removed. Thinning tissue, can tell where has fissured/torn in past   Musculoskeletal: Normal range of motion. Normal ROM in all 4 extremities   Neurological: She is alert and oriented to person, place, and time. No sensory deficit. Skin: Skin is warm and dry. She is not diaphoretic. Psychiatric: She has a normal mood and affect. Her speech is normal and behavior is normal. Judgment and thought content normal. Cognition and memory are normal.   Nursing note and vitals reviewed. Diagnosis Orders   1. Menopausal state  DEXA Bone Density Axial Skeleton   2. Perineal fissure  clobetasol (TEMOVATE) 0.05 % cream   3. Screening for vaginal cancer  PAP SMEAR   4. Screening for HPV (human papillomavirus)  Human papillomavirus (HPV) DNA probe thin prep high risk   5. Screening for osteoporosis  DEXA Bone Density Axial Skeleton   6. Vaginal atrophy         MEDICATIONS:  Orders Placed This Encounter   Medications    clobetasol (TEMOVATE) 0.05 % cream     Sig: Apply topically 2 times daily.      Dispense:  1 Tube     Refill:  2       ORDERS:  Orders Placed This Encounter   Procedures    DEXA Bone Density Axial Skeleton    PAP SMEAR    Human papillomavirus (HPV) DNA probe thin prep high risk       PLAN:  Pap collected  To soak in warm bath nightly and to use organic coconut oil for moisture and will also try a steroid cream nightly until it gets better, then use prn  Patient Instructions     Patient Education        Well Visit, Ages 25 to 48: Care Instructions  Your Care Instructions    Physical exams can help you stay healthy. Your doctor has checked your overall health and may have suggested ways to take good care of yourself. He or she also may have recommended tests. At home, you can help prevent illness with healthy eating, regular exercise, and other steps. Follow-up care is a key part of your treatment and safety. Be sure to make and go to all appointments, and call your doctor if you are having problems. It's also a good idea to know your test results and keep a list of the medicines you take. How can you care for yourself at home? · Reach and stay at a healthy weight. This will lower your risk for many problems, such as obesity, diabetes, heart disease, and high blood pressure. · Get at least 30 minutes of physical activity on most days of the week. Walking is a good choice. You also may want to do other activities, such as running, swimming, cycling, or playing tennis or team sports. Discuss any changes in your exercise program with your doctor. · Do not smoke or allow others to smoke around you. If you need help quitting, talk to your doctor about stop-smoking programs and medicines. These can increase your chances of quitting for good. · Talk to your doctor about whether you have any risk factors for sexually transmitted infections (STIs). Having one sex partner (who does not have STIs and does not have sex with anyone else) is a good way to avoid these infections. · Use birth control if you do not want to have children at this time.  Talk with your doctor about the choices available and what might be best for you. · Protect your skin from too much sun. When you're outdoors from 10 a.m. to 4 p.m., stay in the shade or cover up with clothing and a hat with a wide brim. Wear sunglasses that block UV rays. Even when it's cloudy, put broad-spectrum sunscreen (SPF 30 or higher) on any exposed skin. · See a dentist one or two times a year for checkups and to have your teeth cleaned. · Wear a seat belt in the car. · Drink alcohol in moderation, if at all. That means no more than 2 drinks a day for men and 1 drink a day for women. Follow your doctor's advice about when to have certain tests. These tests can spot problems early. For everyone  · Cholesterol. Have the fat (cholesterol) in your blood tested after age 21. Your doctor will tell you how often to have this done based on your age, family history, or other things that can increase your risk for heart disease. · Blood pressure. Have your blood pressure checked during a routine doctor visit. Your doctor will tell you how often to check your blood pressure based on your age, your blood pressure results, and other factors. · Vision. Talk with your doctor about how often to have a glaucoma test.  · Diabetes. Ask your doctor whether you should have tests for diabetes. · Colon cancer. Have a test for colon cancer at age 48. You may have one of several tests. If you are younger than 48, you may need a test earlier if you have any risk factors. Risk factors include whether you already had a precancerous polyp removed from your colon or whether your parent, brother, sister, or child has had colon cancer. For women  · Breast exam and mammogram. Talk to your doctor about when you should have a clinical breast exam and a mammogram. Medical experts differ on whether and how often women under 50 should have these tests. Your doctor can help you decide what is right for you. · Pap test and pelvic exam. Begin Pap tests at age 24.  A Pap test is the best way to find cervical cancer. The test often is part of a pelvic exam. Ask how often to have this test.  · Tests for sexually transmitted infections (STIs). Ask whether you should have tests for STIs. You may be at risk if you have sex with more than one person, especially if your partners do not wear condoms. For men  · Tests for sexually transmitted infections (STIs). Ask whether you should have tests for STIs. You may be at risk if you have sex with more than one person, especially if you do not wear a condom. · Testicular cancer exam. Ask your doctor whether you should check your testicles regularly. · Prostate exam. Talk to your doctor about whether you should have a blood test (called a PSA test) for prostate cancer. Experts differ on whether and when men should have this test. Some experts suggest it if you are older than 39 and are -American or have a father or brother who got prostate cancer when he was younger than 72. When should you call for help? Watch closely for changes in your health, and be sure to contact your doctor if you have any problems or symptoms that concern you. Where can you learn more? Go to https://[employer].healthVSS Monitoring. org and sign in to your [employer] account. Enter P072 in the PercuVision box to learn more about \"Well Visit, Ages 25 to 48: Care Instructions. \"     If you do not have an account, please click on the \"Sign Up Now\" link. Current as of: March 28, 2018  Content Version: 11.9  © 0378-9948 [employer]. Care instructions adapted under license by [facility] ([facility]). If you have questions about a medical condition or this instruction, always ask your healthcare professional. [address] [address] any warranty or liability for your use of this information. Patient Education        Human Papillomavirus (HPV): Care Instructions  Your Care Instructions  The human papillomavirus (HPV) is a very common virus.  There are many types of HPV. Some types cause the common skin wart. Other types cause genital warts, which can be spread by sexual contact. Some types can increase the risk for cervical and anal cancer. Having one type of HPV does not lead to having another type. Many women who have HPV may not know that they are infected until it is found with a Pap test. Your doctor uses this test to look for abnormal cells on your cervix. If you have had an abnormal Pap test, your doctor may recommend that you have an HPV test.  Like a Pap test, an HPV test is done on a sample of cells collected from the cervix. If the test finds that you have the types of HPV that might lead to cancer, your doctor may suggest more tests. This does not mean that you will develop cancer; it means that you may have an increased risk. Abnormal cell changes caused by HPV often go away on their own. If the changes do not go away, they can be treated. But because HPV can stay inside the body, the abnormal cervical cells sometimes come back. This is why it is important to follow up with your doctor and have regular Pap tests. Follow-up care is a key part of your treatment and safety. Be sure to make and go to all appointments, and call your doctor if you are having problems. It's also a good idea to know your test results and keep a list of the medicines you take. How can you care for yourself at home? · If you are going to have a Pap or HPV test, do not douche or use tampons or vaginal creams in the 24 hours before the test.  · Do not smoke. Smoking increases the risk for cervical problems and abnormal Pap tests. If you need help quitting, talk to your doctor about stop-smoking programs and medicines. These can increase your chances of quitting for good. · Use latex condoms every time you have sex. Use them from the beginning to the end of sexual contact. · Be sure to tell your sexual partner or partners that you have HPV.  Even if you do not have symptoms, you can still pass HPV to others. · Having one sex partner (who does not have STIs and does not have sex with anyone else) is a good way to avoid STIs. When should you call for help? Watch closely for changes in your health, and be sure to contact your doctor if:    · You have vaginal pain during or after sex.     · You have vaginal bleeding when you are not in your menstrual period. Where can you learn more? Go to https://chpejenaeb.health-partners. org and sign in to your [employer] account. Enter F690 in the Genapsys box to learn more about \"Human Papillomavirus (HPV): Care Instructions. \"     If you do not have an account, please click on the \"Sign Up Now\" link. Current as of: September 11, 2018  Content Version: 11.9  © 3229-3809 [employer]. Care instructions adapted under license by [facility] ([facility]). If you have questions about a medical condition or this instruction, always ask your healthcare professional. [address] any warranty or liability for your use of this information.

## 2019-04-02 ENCOUNTER — OFFICE VISIT (OUTPATIENT)
Dept: GASTROENTEROLOGY | Age: 64
End: 2019-04-02
Payer: MEDICARE

## 2019-04-02 VITALS
BODY MASS INDEX: 41.96 KG/M2 | DIASTOLIC BLOOD PRESSURE: 88 MMHG | WEIGHT: 245.8 LBS | HEART RATE: 61 BPM | SYSTOLIC BLOOD PRESSURE: 126 MMHG | HEIGHT: 64 IN | OXYGEN SATURATION: 96 %

## 2019-04-02 DIAGNOSIS — K22.70 BARRETT'S ESOPHAGUS DETERMINED BY BIOPSY: Primary | ICD-10-CM

## 2019-04-02 DIAGNOSIS — K21.9 CHRONIC GERD: ICD-10-CM

## 2019-04-02 DIAGNOSIS — R10.11 RUQ ABDOMINAL PAIN: ICD-10-CM

## 2019-04-02 DIAGNOSIS — Z98.890 HISTORY OF SPHINCTEROTOMY OF SPHINCTER OF ODDI: ICD-10-CM

## 2019-04-02 PROCEDURE — G8417 CALC BMI ABV UP PARAM F/U: HCPCS | Performed by: NURSE PRACTITIONER

## 2019-04-02 PROCEDURE — 1036F TOBACCO NON-USER: CPT | Performed by: NURSE PRACTITIONER

## 2019-04-02 PROCEDURE — 3017F COLORECTAL CA SCREEN DOC REV: CPT | Performed by: NURSE PRACTITIONER

## 2019-04-02 PROCEDURE — G8427 DOCREV CUR MEDS BY ELIG CLIN: HCPCS | Performed by: NURSE PRACTITIONER

## 2019-04-02 PROCEDURE — 99214 OFFICE O/P EST MOD 30 MIN: CPT | Performed by: NURSE PRACTITIONER

## 2019-04-02 RX ORDER — HYOSCYAMINE SULFATE EXTENDED-RELEASE 0.38 MG/1
0.38 TABLET ORAL EVERY 12 HOURS PRN
Qty: 60 TABLET | Refills: 5 | Status: SHIPPED
Start: 2019-04-02 | End: 2019-07-15

## 2019-04-02 RX ORDER — OMEPRAZOLE 20 MG/1
20 CAPSULE, DELAYED RELEASE ORAL DAILY
COMMUNITY

## 2019-04-02 ASSESSMENT — ENCOUNTER SYMPTOMS: ABDOMINAL PAIN: 1

## 2019-04-02 NOTE — PATIENT INSTRUCTIONS
Schedule endoscopy  Nothing to eat or drink after midnight. You will not be able to drive for 24 hours after the procedure due to sedation. Bring a  with you the day of the procedure. No aspirin, ibuprofen, naproxen, fish oil or vitamin E for 5 days before procedure. Continue current medications. If you are on blood thinners, clearance from the prescribing physician will be obtained before your procedure is scheduled. If biopsies are taken during the procedure they will be sent to a pathologist for analysis. You will be notified by mail of the pathology results in 2-3 weeks. Your physician may also schedule a follow up appointment with the nurse practitioner to discuss pathology, symptoms or to check if you have had any problems related to your procedure. If you prefer not to return to the office after your procedure please discuss this with your physician on the day of your procedure.        Trial of hyoscyamine for abdominal pain  Orders Placed This Encounter   Medications    hyoscyamine (LEVBID) 375 MCG extended release tablet     Sig: Take 1 tablet by mouth every 12 hours as needed for Cramping or Diarrhea     Dispense:  60 tablet     Refill:  5     Call if any problems with medication

## 2019-04-09 ENCOUNTER — HOSPITAL ENCOUNTER (OUTPATIENT)
Dept: WOMENS IMAGING | Age: 64
Discharge: HOME | End: 2019-04-09
Payer: MEDICARE

## 2019-04-09 DIAGNOSIS — N95.1 MENOPAUSAL STATE: ICD-10-CM

## 2019-04-09 DIAGNOSIS — Z13.820 SCREENING FOR OSTEOPOROSIS: ICD-10-CM

## 2019-04-09 PROCEDURE — 77080 DXA BONE DENSITY AXIAL: CPT

## 2019-04-24 ENCOUNTER — APPOINTMENT (OUTPATIENT)
Dept: GENERAL RADIOLOGY | Facility: HOSPITAL | Age: 64
End: 2019-04-24

## 2019-04-24 ENCOUNTER — HOSPITAL ENCOUNTER (EMERGENCY)
Facility: HOSPITAL | Age: 64
Discharge: HOME | End: 2019-04-24
Attending: EMERGENCY MEDICINE | Admitting: EMERGENCY MEDICINE

## 2019-04-24 VITALS
HEIGHT: 64 IN | OXYGEN SATURATION: 95 % | HEART RATE: 71 BPM | BODY MASS INDEX: 41.35 KG/M2 | WEIGHT: 242.2 LBS | DIASTOLIC BLOOD PRESSURE: 63 MMHG | SYSTOLIC BLOOD PRESSURE: 133 MMHG | RESPIRATION RATE: 18 BRPM | TEMPERATURE: 100 F

## 2019-04-24 DIAGNOSIS — I89.0 CHRONIC ACQUIRED LYMPHEDEMA: Primary | ICD-10-CM

## 2019-04-24 DIAGNOSIS — L03.114 CELLULITIS OF LEFT UPPER EXTREMITY: ICD-10-CM

## 2019-04-24 LAB
ANION GAP SERPL CALCULATED.3IONS-SCNC: 9 MMOL/L (ref 4–13)
BASOPHILS # BLD AUTO: 0.04 10*3/MM3 (ref 0–0.2)
BASOPHILS NFR BLD AUTO: 0.3 % (ref 0–2)
BUN BLD-MCNC: 16 MG/DL (ref 5–21)
BUN/CREAT SERPL: 17.8 (ref 7–25)
CALCIUM SPEC-SCNC: 9.1 MG/DL (ref 8.4–10.4)
CHLORIDE SERPL-SCNC: 100 MMOL/L (ref 98–110)
CO2 SERPL-SCNC: 28 MMOL/L (ref 24–31)
CREAT BLD-MCNC: 0.9 MG/DL (ref 0.5–1.4)
DEPRECATED RDW RBC AUTO: 40.8 FL (ref 40–54)
EOSINOPHIL # BLD AUTO: 0.04 10*3/MM3 (ref 0–0.7)
EOSINOPHIL NFR BLD AUTO: 0.3 % (ref 0–4)
ERYTHROCYTE [DISTWIDTH] IN BLOOD BY AUTOMATED COUNT: 14.4 % (ref 12–15)
GFR SERPL CREATININE-BSD FRML MDRD: 64 ML/MIN/1.73
GLUCOSE BLD-MCNC: 116 MG/DL (ref 70–100)
HCT VFR BLD AUTO: 38.4 % (ref 37–47)
HGB BLD-MCNC: 12.5 G/DL (ref 12–16)
IMM GRANULOCYTES # BLD AUTO: 0.05 10*3/MM3 (ref 0–0.05)
IMM GRANULOCYTES NFR BLD AUTO: 0.4 % (ref 0–5)
LYMPHOCYTES # BLD AUTO: 0.98 10*3/MM3 (ref 0.72–4.86)
LYMPHOCYTES NFR BLD AUTO: 7.9 % (ref 15–45)
MCH RBC QN AUTO: 25.6 PG (ref 28–32)
MCHC RBC AUTO-ENTMCNC: 32.6 G/DL (ref 33–36)
MCV RBC AUTO: 78.5 FL (ref 82–98)
MONOCYTES # BLD AUTO: 0.67 10*3/MM3 (ref 0.19–1.3)
MONOCYTES NFR BLD AUTO: 5.4 % (ref 4–12)
NEUTROPHILS # BLD AUTO: 10.63 10*3/MM3 (ref 1.87–8.4)
NEUTROPHILS NFR BLD AUTO: 85.7 % (ref 39–78)
NRBC BLD AUTO-RTO: 0 /100 WBC (ref 0–0.2)
PLATELET # BLD AUTO: 252 10*3/MM3 (ref 130–400)
PMV BLD AUTO: 10.4 FL (ref 6–12)
POTASSIUM BLD-SCNC: 4.5 MMOL/L (ref 3.5–5.3)
RBC # BLD AUTO: 4.89 10*6/MM3 (ref 4.2–5.4)
SODIUM BLD-SCNC: 137 MMOL/L (ref 135–145)
WBC NRBC COR # BLD: 12.41 10*3/MM3 (ref 4.8–10.8)

## 2019-04-24 PROCEDURE — 85025 COMPLETE CBC W/AUTO DIFF WBC: CPT | Performed by: EMERGENCY MEDICINE

## 2019-04-24 PROCEDURE — 80048 BASIC METABOLIC PNL TOTAL CA: CPT | Performed by: EMERGENCY MEDICINE

## 2019-04-24 PROCEDURE — 73080 X-RAY EXAM OF ELBOW: CPT

## 2019-04-24 PROCEDURE — 99283 EMERGENCY DEPT VISIT LOW MDM: CPT

## 2019-04-24 RX ORDER — CLINDAMYCIN HYDROCHLORIDE 300 MG/1
300 CAPSULE ORAL 4 TIMES DAILY
Qty: 40 CAPSULE | Refills: 0 | Status: SHIPPED | OUTPATIENT
Start: 2019-04-24 | End: 2019-05-04

## 2019-04-25 ENCOUNTER — ANESTHESIA EVENT (OUTPATIENT)
Dept: OPERATING ROOM | Age: 64
End: 2019-04-25

## 2019-04-25 ENCOUNTER — TELEPHONE (OUTPATIENT)
Dept: GASTROENTEROLOGY | Age: 64
End: 2019-04-25

## 2019-04-25 RX ORDER — SODIUM CHLORIDE, SODIUM LACTATE, POTASSIUM CHLORIDE, CALCIUM CHLORIDE 600; 310; 30; 20 MG/100ML; MG/100ML; MG/100ML; MG/100ML
INJECTION, SOLUTION INTRAVENOUS CONTINUOUS
Status: CANCELLED | OUTPATIENT
Start: 2019-04-25

## 2019-04-25 NOTE — TELEPHONE ENCOUNTER
4-25-19 4:19 PM      Last OV with BG aprn on 4-2-19. EGD is scheduled on 4-29-19. Patient called today from [phone] said she had to go to ED yesterday and she has cellulitis in her left arm and it is swollen and painful, she is uncomfortable at present. Patient said this cellulitis is from a bug bite and she is currently on antibiotics per ED x 10 days and wants to know if she should reschedule her EGD on Monday. I told the patient this will limit Endo to one arm for IV and we have to worry about this going systemic and she agrees ans states she is not feeling her best at present anyway and the EGD was due to recall for Reza's and some RUQ pain and Gerd. I will forward to [facility], she will CA the EGD for 4-29-19 and try to get her rescheduled in 2-3 weeks and call the patient back.  Maninder adrian

## 2019-04-29 ENCOUNTER — TELEPHONE (OUTPATIENT)
Dept: GASTROENTEROLOGY | Age: 64
End: 2019-04-29

## 2019-04-29 ENCOUNTER — ANESTHESIA (OUTPATIENT)
Dept: OPERATING ROOM | Age: 64
End: 2019-04-29

## 2019-04-29 NOTE — TELEPHONE ENCOUNTER
Patient saw Joe Yang on 4/2/19. She had pancreatitis in her past nad an ERCP and talked to Joe Yang about having another ERCP. Joe Yang talked to Dr. Robina Rosenbaum, based on her symptoms we would just do an EGD now and he wanted to get her records from previous ERCP. I scheduled patient for EGD on 4/29/19, spoke with her about her ERCP so I could her records. She had it done around 1992 at [facility] in [place]. [place], with a Dr. Sharon Noland (she wasn't sure about spelling).   I called [facility] they did not have records from that far back and was not familiar with that Dr. Carolina Nuñez spoke with Joe Yang to let her know at this time we will just proceed with the EGD

## 2019-05-30 ENCOUNTER — HOSPITAL ENCOUNTER (OUTPATIENT)
Age: 64
Setting detail: OUTPATIENT SURGERY
Discharge: HOME | End: 2019-05-30
Attending: INTERNAL MEDICINE | Admitting: INTERNAL MEDICINE
Payer: MEDICARE

## 2019-05-30 ENCOUNTER — HOSPITAL ENCOUNTER (OUTPATIENT)
Age: 64
Setting detail: SPECIMEN
Discharge: HOME | End: 2019-05-30
Payer: MEDICARE

## 2019-05-30 ENCOUNTER — APPOINTMENT (OUTPATIENT)
Dept: OPERATING ROOM | Age: 64
End: 2019-05-30

## 2019-05-30 VITALS
HEART RATE: 52 BPM | RESPIRATION RATE: 16 BRPM | DIASTOLIC BLOOD PRESSURE: 50 MMHG | TEMPERATURE: 98.1 F | OXYGEN SATURATION: 100 % | HEIGHT: 64 IN | BODY MASS INDEX: 38.93 KG/M2 | WEIGHT: 228 LBS | SYSTOLIC BLOOD PRESSURE: 112 MMHG

## 2019-05-30 VITALS — SYSTOLIC BLOOD PRESSURE: 103 MMHG | DIASTOLIC BLOOD PRESSURE: 49 MMHG | OXYGEN SATURATION: 95 %

## 2019-05-30 PROCEDURE — 43239 EGD BIOPSY SINGLE/MULTIPLE: CPT

## 2019-05-30 PROCEDURE — 88312 SPECIAL STAINS GROUP 1: CPT

## 2019-05-30 PROCEDURE — 43239 EGD BIOPSY SINGLE/MULTIPLE: CPT | Performed by: INTERNAL MEDICINE

## 2019-05-30 PROCEDURE — G8918 PT W/O PREOP ORDER IV AB PRO: HCPCS

## 2019-05-30 PROCEDURE — G8907 PT DOC NO EVENTS ON DISCHARG: HCPCS

## 2019-05-30 PROCEDURE — 88305 TISSUE EXAM BY PATHOLOGIST: CPT

## 2019-05-30 RX ORDER — LEVOTHYROXINE SODIUM 0.05 MG/1
50 TABLET ORAL DAILY
COMMUNITY

## 2019-05-30 RX ORDER — PROPOFOL 10 MG/ML
INJECTION, EMULSION INTRAVENOUS PRN
Status: DISCONTINUED | OUTPATIENT
Start: 2019-05-30 | End: 2019-05-30 | Stop reason: SDUPTHER

## 2019-05-30 RX ORDER — SODIUM CHLORIDE 9 MG/ML
INJECTION, SOLUTION INTRAVENOUS CONTINUOUS
Status: DISCONTINUED | OUTPATIENT
Start: 2019-05-30 | End: 2019-05-30 | Stop reason: HOSPADM

## 2019-05-30 RX ORDER — LIDOCAINE HYDROCHLORIDE 10 MG/ML
INJECTION, SOLUTION INFILTRATION; PERINEURAL PRN
Status: DISCONTINUED | OUTPATIENT
Start: 2019-05-30 | End: 2019-05-30 | Stop reason: SDUPTHER

## 2019-05-30 RX ORDER — LIDOCAINE HYDROCHLORIDE 10 MG/ML
1 INJECTION, SOLUTION EPIDURAL; INFILTRATION; INTRACAUDAL; PERINEURAL
Status: DISCONTINUED | OUTPATIENT
Start: 2019-05-30 | End: 2019-05-30 | Stop reason: HOSPADM

## 2019-05-30 RX ADMIN — PROPOFOL 200 MG: 10 INJECTION, EMULSION INTRAVENOUS at 11:58

## 2019-05-30 RX ADMIN — LIDOCAINE HYDROCHLORIDE 40 MG: 10 INJECTION, SOLUTION INFILTRATION; PERINEURAL at 11:58

## 2019-05-30 RX ADMIN — SODIUM CHLORIDE: 9 INJECTION, SOLUTION INTRAVENOUS at 11:07

## 2019-05-30 ASSESSMENT — PAIN SCALES - GENERAL
PAINLEVEL_OUTOF10: 0
PAINLEVEL_OUTOF10: 0

## 2019-05-30 NOTE — OP NOTE
Endoscopic Procedure Note    Patient: Sherrel Sandifer : 1955  Med Rec#: [id number] Acc#: [id number]     Primary Care Provider RAFAEL Perez - CNP  Referring Provider: RAFAEL Swain    Endoscopist: Camilla Che MD    Date of Procedure:  2019    Procedure:   1. EGD with biopsy    Indications:   1. Hx of Reza's esophagus  2. Reflux     Anesthesia:  Sedation was administered by anesthesia who monitored the patient during the procedure. Estimated Blood Loss: minimal    Procedure:   After reviewing the patient's chart and obtaining informed consent, the patient was placed in the left lateral decubitus position. A forward-viewing Olympus endoscope was lubricated and inserted through the mouth into the oropharynx. Under direct visualization, the upper esophagus was intubated. The scope was advanced to the level of the third portion of duodenum. Scope was slowly withdrawn with careful inspection of the mucosal surfaces. The scope was retroflexed for inspection of the gastric fundus and incisura. Findings and maneuvers are listed in impression below. The patient tolerated the procedure well. The scope was removed. There were no immediate complications. Findings:   Esophagus: abnormal: mucosal changes consistent with hx of Reza's- biopsied. There is a 4 cm hiatal hernia. Stomach: Abnormal: Mild mucosal changes suggestive of gastritis noted -  Gastric biopsies were taken from the antrum and body to rule out Helicobacter pylori infection. Duodenum: normal      IMPRESSION:  1. Gastric and esophageal biopsies obtained as above       RECOMMENDATIONS:    1. Await path results, the patient will be contacted in 7-10 days with biopsy results. 2.  Continue PPI  3. Repeat EGD in 3 years for hx of Reza's     The results were discussed with the patient and family. A copy of the images obtained were given to the patient.      Shawna Matos am scribing for and in the presence of Dr. Sheri Radford MD.  Electronically signed by Estiven Salomon RN on 5/30/2019 at 10:56 AM    I personally performed the services described in this documentation as scribed by Huber Gordon, and it appears accurate and complete.      Negrito Goins MD  5/30/2019

## 2019-05-30 NOTE — H&P
Patient Name: Charlene Dodd  : 1955  MRN: [id number]  DATE: 19    Allergies: Allergies   Allergen Reactions    Neosporin [Neomycin-Polymyxin-Gramicidin]     Penicillins     Sulfa Antibiotics     Zithromax [Azithromycin]         ENDOSCOPY  History and Physical    Procedure:    [] Diagnostic Colonoscopy       [] Screening Colonoscopy  [x] EGD      [] ERCP      [] EUS       [] Other    [x] Previous office notes/History and Physical reviewed from the patients chart. Please see EMR for further details of HPI. I have examined the patient's status immediately prior to the procedure and:      Indications/HPI:    []Abdominal Pain   []Cancer- GI/Lung     []Fhx of colon CA/polyps  []History of Polyps  [x]Barretts            []Melena  []Abnormal Imaging              []Dysphagia              []Persistent Pneumonia   []Anemia                            []Food Impaction        []History of Polyps  [] GI Bleed             []Pulmonary nodule/Mass   []Change in bowel habits []Heartburn/Reflux  []Rectal Bleed (BRBPR)  []Chest Pain - Non Cardiac []Heme (+) Stool []Ulcers  []Constipation  []Hemoptysis  []Varices  []Diarrhea  []Hypoxemia    []Nausea/Vomiting   []Screening   []Crohns/Colitis  []Other:     Anesthesia:   [x] MAC [] Moderate Sedation   [] General   [] None     ROS: 12 pt Review of Symptoms was negative unless mentioned above    Medications:   Prior to Admission medications    Medication Sig Start Date End Date Taking?  Authorizing Provider   levothyroxine (SYNTHROID) 50 MCG tablet Take 50 mcg by mouth Daily   Yes Historical Provider, MD   omeprazole (PRILOSEC) 20 MG delayed release capsule Take 20 mg by mouth daily   Yes Historical Provider, MD   bisoprolol (ZEBETA) 5 MG tablet  3/18/19  Yes Historical Provider, MD   ezetimibe (ZETIA) 10 MG tablet Take 10 mg by mouth daily   Yes Historical Provider, MD   Cholecalciferol (VITAMIN D3) 2000 UNITS CAPS Take by mouth   Yes Historical Provider, MD   aspirin 81 MG tablet Take 81 mg by mouth daily   Yes Historical Provider, MD   hyoscyamine (LEVBID) 375 MCG extended release tablet Take 1 tablet by mouth every 12 hours as needed for Cramping or Diarrhea 4/2/19   RAFAEL Cardoso   clobetasol (TEMOVATE) 0.05 % cream Apply topically 2 times daily. 4/1/19   Tamsen Flirt RAFAEL Faulkner   pantoprazole (PROTONIX) 40 MG tablet Take 1 tablet by mouth daily Take daily first thing in the morning on an empty stomach.  4/26/18   RAFAEL Poon   hyoscyamine (LEVSIN/SL) 125 MCG sublingual tablet Place 1 tablet under the tongue every 4 hours as needed for Cramping 4/26/18   RAFAEL Poon   escitalopram (LEXAPRO) 10 MG tablet  12/20/17   Historical Provider, MD       Past Medical History:  Past Medical History:   Diagnosis Date    Acid reflux     Breast [facility] ([facility] [address]     Dysplasia of cervix     Hyperlipidemia     Hypertension     Thyroid disease        Past Surgical History:  Past Surgical History:   Procedure Laterality Date    BREAST RECONSTRUCTION  2010    BREAST RECONSTRUCTION  2011    BREAST RECONSTRUCTION  2011    CHOLECYSTECTOMY  2006    COLONOSCOPY  12/2011    Pt will see Dr. Castro Tijerina 2017   [address] with retained ovaries    MASTECTOMY, BILATERAL  2008    OTHER SURGICAL HISTORY  2006    Sphincter ODODDI    TN COLONOSCOPY FLX DX W/COLLJ SPEC WHEN PFRMD N/A 4/26/2017    Dr Mart-diverticulosis, 5 yr recall    TN EGD TRANSORAL BIOPSY SINGLE/MULTIPLE N/A 4/11/2018    Dr Castro Tijerina (+) Reza's (-) dysplasia-Zuri (-)--1 yr recall       Social History:  Social History     Tobacco Use    Smoking status: Never Smoker    Smokeless tobacco: Never Used   Substance Use Topics    Alcohol use: Yes     Comment: rarely    Drug use: No       Vital Signs:   Vitals:    05/30/19 1047   BP: (!) 142/84   Pulse: 54   Resp: 18   SpO2: 100%        Physical Exam:  Cardiac:  [x]WNL  []Comments:  Pulmonary:  [x]WNL []Comments:  Neuro/Mental Status:  [x]WNL  []Comments:  Abdominal:  [x]WNL    []Comments:  Other:   []WNL  []Comments:    Informed Consent:  The risks and benefits of the procedure have been discussed with either the patient or if they cannot consent, their representative. Assessment:  Patient examined and appropriate for planned sedation and procedure. Plan:  Proceed with planned sedation and procedure as above. Pamela Dillon am scribing for and in the presence of Dr. Autumn Randolph MD.  Electronically signed by Eryn Yen RN on 5/30/2019 at 10:56 AM    I personally performed the services described in this documentation as scribed by Gabriela Saunders, and it appears accurate and complete.      Autumn Randolph MD  5/30/2019

## 2019-05-30 NOTE — ANESTHESIA PRE PROCEDURE
Allergies: Allergies   Allergen Reactions    Neosporin [Neomycin-Polymyxin-Gramicidin]     Penicillins     Sulfa Antibiotics     Zithromax [Azithromycin]        Problem List:    Patient Active Problem List   Diagnosis Code    LUQ abdominal pain R10.12    Chronic GERD K21.9    Reza's esophagus determined by biopsy K22.70    RUQ abdominal pain R10.11       Past Medical History:        Diagnosis Date    Acid reflux     Breast cancer ([address]     Dysplasia of cervix     Hyperlipidemia     Hypertension     Thyroid disease        Past Surgical History:        Procedure Laterality Date    BREAST RECONSTRUCTION  2010    BREAST RECONSTRUCTION  2011    BREAST RECONSTRUCTION  2011    CHOLECYSTECTOMY  2006    COLONOSCOPY  12/2011    Pt will see Dr. Suzanna Bell 2017   [address] [address]   [address] with retained ovaries    MASTECTOMY, BILATERAL  2008    OTHER SURGICAL HISTORY  2006    Sphincter ODODDI    NM COLONOSCOPY FLX DX W/COLLJ SPEC WHEN PFRMD N/A 4/26/2017    Dr Mart-diverticulosis, 5 yr recall    NM EGD TRANSORAL BIOPSY SINGLE/MULTIPLE N/A 4/11/2018    Dr Suzanna Bell (+) Reza's (-) dysplasia-Zuri (-)--1 yr recall       Social History:    Social History     Tobacco Use    Smoking status: Never Smoker    Smokeless tobacco: Never Used   Substance Use Topics    Alcohol use: Yes     Comment: rarely                                Counseling given: Not Answered      Vital Signs (Current): There were no vitals filed for this visit.                                            BP Readings from Last 3 Encounters:   05/30/19 (!) 142/84   04/02/19 126/88   04/01/19 128/67       NPO Status:                                                                                 BMI:   Wt Readings from Last 3 Encounters:   05/30/19 228 lb (103.4 kg)   04/02/19 245 lb 12.8 oz (111.5 kg)   04/01/19 243 lb (110.2 kg)     There is no height or weight on file to calculate BMI.    CBC: No results found for: WBC, RBC, HGB, HCT, MCV, RDW, PLT    CMP: No results found for: NA, K, CL, CO2, BUN, CREATININE, GFRAA, AGRATIO, LABGLOM, GLUCOSE, PROT, CALCIUM, BILITOT, ALKPHOS, AST, ALT    POC Tests: No results for input(s): POCGLU, POCNA, POCK, POCCL, POCBUN, POCHEMO, POCHCT in the last 72 hours. Coags: No results found for: PROTIME, INR, APTT    HCG (If Applicable): No results found for: PREGTESTUR, PREGSERUM, HCG, HCGQUANT     ABGs: No results found for: PHART, PO2ART, MFQ3PCH, UTR1NVG, BEART, A5OLYMWB     Type & Screen (If Applicable):  No results found for: [facility]    Anesthesia Evaluation  Patient summary reviewed and Nursing notes reviewed no history of anesthetic complications:   Airway: Mallampati: II  TM distance: >3 FB   Neck ROM: full  Mouth opening: > = 3 FB Dental: normal exam         Pulmonary:Negative Pulmonary ROS and normal exam                               Cardiovascular:    (+) hypertension: moderate, hyperlipidemia                  Neuro/Psych:   (+) depression/anxiety             GI/Hepatic/Renal:   (+) GERD: well controlled, renal disease: kidney stones, morbid obesity         ROS comment: Reza's esophagus  etoh use. Endo/Other:    (+) hypothyroidism::., malignancy/cancer (left breast cancer with bilat mastectomy with chemo and radiation in 2008). Pt had no PAT visit       Abdominal:   (+) obese,         Vascular: negative vascular ROS. Anesthesia Plan      general     ASA 3       Induction: intravenous. Anesthetic plan and risks discussed with patient.                       RAFAEL Warren CRNA   5/30/2019

## 2019-06-07 LAB
HPV TYPE 16: NOT DETECTED
HPV TYPE 18: NOT DETECTED
HPV, HIGH RISK OTHER: NOT DETECTED
INTERPRETATION: NORMAL
SOURCE: NORMAL

## 2019-06-11 ENCOUNTER — TELEPHONE (OUTPATIENT)
Dept: GASTROENTEROLOGY | Age: 64
End: 2019-06-11

## 2019-06-11 NOTE — TELEPHONE ENCOUNTER
[address] [facility]                                   Kerrie Conde  Department of Pathology  FINAL SURGICAL PATHOLOGY REPORT  Patient Name: Price Dickerson             Accession No:  [id number]   Age Sex:   1955    63 Y F        Pt Type: O     KLLAB                                             Location:  Account #     [de-identified]                 Collected:     2019  Med Rec #      [id number]                    Received:      2019  Attend Phys:                               Completed:     2019  Perform Phys: Bhaskar Mojica    FINAL DIAGNOSIS:    A: Stomach, biopsy:  Chemical/reactive gastropathy. Special stain is negative for Helicobacter pylori. B: Esophagus, biopsy:  Mild chronic esophagogastritis with focal intestinal metaplasia. No evidence of dysplasia.     DRW/DRW      Last OV BG arnold 19. History of Reza's. Egd completed 19. FU scheduled 7-15-19. Patient called from [phone] asking for BG arnold's opinion on her recent path report ( see above), I will have Jon Peeling review and I can then call the patient back.  Maninder adrian

## 2019-06-11 NOTE — TELEPHONE ENCOUNTER
Reactive gastropathy. This indicates some mild inflammation in the stomach most likely r/t medications. NSAIDs are the most commonly associated with this but others or smoking may cause as well. She was positive for Reza's. I did not look if this was a first diagnosis or not. No dysplasia was noted.    We can review this diagnosis at her appt

## 2019-06-12 NOTE — TELEPHONE ENCOUNTER
This patient called me back @ 9:04 am, we went over the information fron BG arnold regarding her path report, she will continue the use of her PPI and discuss further with BG arnold on her office FU. Patient voiced appreciation for the return call.  Maninder adrian

## 2019-06-12 NOTE — TELEPHONE ENCOUNTER
I called this patient @ 8:29 am, she did not answer, I left a VM asking her to call me back.  Maninder adrian

## 2019-07-15 ENCOUNTER — OFFICE VISIT (OUTPATIENT)
Dept: GASTROENTEROLOGY | Age: 64
End: 2019-07-15
Payer: MEDICARE

## 2019-07-15 VITALS
OXYGEN SATURATION: 95 % | HEIGHT: 64 IN | BODY MASS INDEX: 42.68 KG/M2 | DIASTOLIC BLOOD PRESSURE: 80 MMHG | HEART RATE: 54 BPM | SYSTOLIC BLOOD PRESSURE: 132 MMHG | WEIGHT: 250 LBS

## 2019-07-15 DIAGNOSIS — K21.9 CHRONIC GERD: ICD-10-CM

## 2019-07-15 DIAGNOSIS — R10.13 EPIGASTRIC PAIN: ICD-10-CM

## 2019-07-15 DIAGNOSIS — R10.11 RUQ ABDOMINAL PAIN: Primary | ICD-10-CM

## 2019-07-15 DIAGNOSIS — K22.70 BARRETT'S ESOPHAGUS DETERMINED BY BIOPSY: ICD-10-CM

## 2019-07-15 PROCEDURE — 99214 OFFICE O/P EST MOD 30 MIN: CPT | Performed by: NURSE PRACTITIONER

## 2019-07-15 PROCEDURE — 3017F COLORECTAL CA SCREEN DOC REV: CPT | Performed by: NURSE PRACTITIONER

## 2019-07-15 PROCEDURE — G8427 DOCREV CUR MEDS BY ELIG CLIN: HCPCS | Performed by: NURSE PRACTITIONER

## 2019-07-15 PROCEDURE — 1036F TOBACCO NON-USER: CPT | Performed by: NURSE PRACTITIONER

## 2019-07-15 PROCEDURE — G8417 CALC BMI ABV UP PARAM F/U: HCPCS | Performed by: NURSE PRACTITIONER

## 2019-07-15 RX ORDER — CALCIUM POLYCARBOPHIL 625 MG 625 MG/1
2 TABLET ORAL EVERY EVENING
COMMUNITY

## 2019-07-15 ASSESSMENT — ENCOUNTER SYMPTOMS
CONSTIPATION: 0
ABDOMINAL DISTENTION: 0
COUGH: 0
RECTAL PAIN: 0
BACK PAIN: 0
VOICE CHANGE: 0
SORE THROAT: 0
BLOOD IN STOOL: 0
SHORTNESS OF BREATH: 0
CHEST TIGHTNESS: 0
NAUSEA: 0
ABDOMINAL PAIN: 1
DIARRHEA: 0
VOMITING: 0

## 2019-07-15 NOTE — PROGRESS NOTES
(ZEBETA) 5 MG tablet       clobetasol (TEMOVATE) 0.05 % cream Apply topically 2 times daily. 1 Tube 2    hyoscyamine (LEVSIN/SL) 125 MCG sublingual tablet Place 1 tablet under the tongue every 4 hours as needed for Cramping 360 tablet 3    ezetimibe (ZETIA) 10 MG tablet Take 10 mg by mouth daily      Cholecalciferol (VITAMIN D3) 2000 UNITS CAPS Take by mouth      aspirin 81 MG tablet Take 81 mg by mouth daily       No current facility-administered medications for this visit. Allergies   Allergen Reactions    Neosporin [Neomycin-Polymyxin-Gramicidin]     Penicillins     Sulfa Antibiotics     Zithromax [Azithromycin]         reports that she has never smoked. She has never used smokeless tobacco. She reports that she drinks alcohol. She reports that she does not use drugs. Review of Systems   Constitutional: Negative for appetite change, fever and unexpected weight change. HENT: Negative for sore throat and voice change. Respiratory: Negative for cough, chest tightness and shortness of breath. Cardiovascular: Negative for chest pain, palpitations and leg swelling. Gastrointestinal: Positive for abdominal pain. Negative for abdominal distention, blood in stool, constipation, diarrhea, nausea, rectal pain and vomiting. Reflux   Musculoskeletal: Negative for arthralgias, back pain and gait problem. Skin: Negative for pallor, rash and wound. Neurological: Negative for dizziness, weakness and light-headedness. Hematological: Negative for adenopathy. Does not bruise/bleed easily. All other systems reviewed and are negative. Objective:   Physical Exam   Constitutional: She is oriented to person, place, and time. She appears well-developed and well-nourished. No distress. /80   Pulse 54   Ht 5' 4\" (1.626 m)   Wt 250 lb (113.4 kg)   SpO2 95%   BMI 42.91 kg/m²      Eyes: Conjunctivae are normal. No scleral icterus. Neck: No tracheal deviation present.

## 2019-07-15 NOTE — PATIENT INSTRUCTIONS
Patient Education        Reza's Esophagus: Care Instructions  Your Care Instructions    The esophagus is the tube that connects the throat to the stomach. Food and liquids go through this tube. In Reza's esophagus, the cells that line the tube change. This is usually because of gastroesophageal reflux disease (GERD). GERD causes acid from your stomach to back up into the esophagus. When you have Reza's esophagus, you are slightly more likely to get cancer of the esophagus. So regular testing is important to watch for signs of this cancer. You can treat GERD to control your symptoms and feel better. Follow-up care is a key part of your treatment and safety. Be sure to make and go to all appointments, and call your doctor if you are having problems. It's also a good idea to know your test results and keep a list of the medicines you take. How can you care for yourself at home? · Take your medicines exactly as prescribed. Call your doctor if you think you are having a problem with your medicine. · If you take over-the-counter medicine, such as antacids or acid reducers, follow all instructions on the label. If you use these medicines often, talk with your doctor. Be careful when you take over-the-counter antacid medicines. Many of these medicines have aspirin in them. Read the label to make sure that you are not taking more than the recommended dose. Too much aspirin can be harmful. · Do not smoke or chew tobacco. Smoking can make GERD worse. If you need help quitting, talk to your doctor about stop-smoking programs and medicines. These can increase your chances of quitting for good. · Chocolate, mint, and alcohol can make GERD worse. They relax the valve between the esophagus and the stomach. · Spicy foods, foods that have a lot of acid (like tomatoes and oranges), and coffee can make GERD symptoms worse in some people.  If your symptoms are worse after you eat a certain food, you may want to stop eating that food to see if your symptoms get better. · Eat smaller meals, and more often. After eating, wait 2 to 3 hours before you lie down. · Raise the head of your bed 6 to 8 inches by putting blocks under the frame or a foam wedge under the head of the mattress. · Do not wear tight clothing around your midsection. · If you are overweight, lose weight. Even losing 5 to 10 pounds can help. When should you call for help? Call your doctor now or seek immediate medical care if:    · You have new or worse belly pain.     · You are vomiting.    Watch closely for changes in your health, and be sure to contact your doctor if:    · You have any pain or difficulty swallowing.     · You are losing weight.     · You have new or worse symptoms, such as heartburn.     · You do not get better as expected. Where can you learn more? Go to https://[employer]Toywheel.Chatous. org and sign in to your Populr account. Enter L146 in the TapInfluence box to learn more about \"Reza's Esophagus: Care Instructions. \"     If you do not have an account, please click on the \"Sign Up Now\" link. Current as of: November 7, 2018  Content Version: 12.0  © 9928-0515 Healthwise, Incorporated. Care instructions adapted under license by [facility] ([facility]). If you have questions about a medical condition or this instruction, always ask your healthcare professional. [place] [address] any warranty or liability for your use of this information.

## 2019-07-24 ENCOUNTER — HOSPITAL ENCOUNTER (OUTPATIENT)
Dept: CT IMAGING | Age: 64
Discharge: HOME | End: 2019-07-24
Payer: MEDICARE

## 2019-07-24 DIAGNOSIS — R10.11 RUQ ABDOMINAL PAIN: ICD-10-CM

## 2019-07-24 DIAGNOSIS — R10.13 EPIGASTRIC PAIN: ICD-10-CM

## 2019-07-24 LAB
GFR NON-AFRICAN AMERICAN: >60
PERFORMED ON: NORMAL
POC CREATININE: 0.9 MG/DL (ref 0.3–1.3)
POC SAMPLE TYPE: NORMAL

## 2019-07-24 PROCEDURE — 6360000004 HC RX CONTRAST MEDICATION: Performed by: NURSE PRACTITIONER

## 2019-07-24 PROCEDURE — 82565 ASSAY OF CREATININE: CPT

## 2019-07-24 PROCEDURE — 74177 CT ABD & PELVIS W/CONTRAST: CPT

## 2019-07-24 RX ADMIN — IOPAMIDOL 90 ML: 755 INJECTION, SOLUTION INTRAVENOUS at 09:46

## 2019-10-16 NOTE — PROGRESS NOTES
Name:  Demi Morataya  YOB: 1955  Location: [facility]  Location Address: [address] [place] [address], [facility], [address] [place] [address]  Location Phone: [phone]    Chief Complaint  Chief Complaint   Patient presents with   • Skin Lesion       History of Present Illness  The patient  is a 64 y.o. female who is referred by Laya Aguiar MD for preoperative evaluation. She complains of a lesion of the left superior lateral malar cheek present for 6 month(s). It has been  biopsied.  Pathology demonstrated a dermal melanosis and solar elastosis. The lesion has been getting larger and changing over time.                         Past Medical History:   Diagnosis Date   • Acid reflux    • Breast cancer (CMS/HCC)    • Disease of thyroid gland    • High cholesterol    • Hypertension    • Kidney stone    • PONV (postoperative nausea and vomiting)        Past Surgical History:   Procedure Laterality Date   • BONE CYST EXCISION Left     CYST REMOVED FROM LEFT RING FINGER, GRAFT TAKEN FROM RIGHT HIP   • BREAST RECONSTRUCTION Bilateral    • CHOLECYSTECTOMY     • EXTRACORPOREAL SHOCK WAVE LITHOTRIPSY (ESWL) Left 2017    Procedure: EXTRACORPOREAL SHOCKWAVE LITHOTRIPSY;  Surgeon: Hollis Garcia MD;  Location: [facility];  Service:    • HYSTERECTOMY     • MASTECTOMY Bilateral    • OTHER SURGICAL HISTORY      SPHINCTER OF ODDI REPAIR          Current Outpatient Medications:   •  ALPRAZolam (XANAX) 0.25 MG tablet, Take 0.25 mg by mouth 3 (Three) Times a Day As Needed., Disp: , Rfl: 0  •  aspirin 81 MG tablet, Take 81 mg by mouth Daily., Disp: , Rfl:   •  bisoprolol (ZEBeta) 5 MG tablet, Take 5 mg by mouth Daily., Disp: , Rfl:   •  Calcium-Magnesium-Vitamin D (CALCIUM 500 PO), Take 500 mg by mouth Daily., Disp: , Rfl:   •  escitalopram (LEXAPRO) 10 MG tablet, , Disp: , Rfl:   •  Ezetimibe (ZETIA PO), Take 10 mg by mouth Daily., Disp: , Rfl:   •  levothyroxine (SYNTHROID,  LEVOTHROID) 50 MCG tablet, Take 50 mcg by mouth Daily., Disp: , Rfl:   •  omeprazole (priLOSEC) 20 MG capsule, Take 20 mg by mouth Daily., Disp: , Rfl:   •  Vitamin D, Cholecalciferol, (CHOLECALCIFEROL) 400 UNITS tablet, Take 400 Units by mouth Daily., Disp: , Rfl:     Sulfa antibiotics; Azithromycin; Neomycin-polymyxin-gramicidin; Neosporin [neomycin-bacitracin zn-polymyx]; Other; and Penicillins    Family History   Problem Relation Age of Onset   • Cancer Father    • Kidney cancer Mother        Social History     Socioeconomic History   • Marital status:      Spouse name: Not on file   • Number of children: Not on file   • Years of education: Not on file   • Highest education level: Not on file   Tobacco Use   • Smoking status: Never Smoker   • Smokeless tobacco: Never Used   Substance and Sexual Activity   • Alcohol use: No   • Drug use: No   • Sexual activity: Defer       Review of Systems   Constitutional: Negative.    HENT: Negative.    Eyes: Negative.    Respiratory: Negative.    Cardiovascular: Negative.    Gastrointestinal: Negative.    Endocrine: Negative.    Genitourinary: Negative.    Musculoskeletal: Negative.    Skin:         lesion of the left superior lateral malar cheek    Allergic/Immunologic: Negative.    Neurological: Negative.    Hematological: Negative.    Psychiatric/Behavioral: Negative.        Vitals:    10/17/19 1424   BP: 153/90   Pulse: 69   Temp: 98.5 °F (36.9 °C)       Objective     Physical Exam   Constitutional: Vital signs are normal. She appears well-developed and well-nourished. No distress.   HENT:   Head: Normocephalic and atraumatic.       Right Ear: External ear normal.   Left Ear: External ear normal.   Eyes: Conjunctivae and EOM are normal. Pupils are equal, round, and reactive to light. No scleral icterus.   Pulmonary/Chest: Effort normal.   Neurological: She is alert. No cranial nerve deficit.   Skin: Skin is warm and dry. No rash noted. She is not diaphoretic. No  erythema. No pallor.   Psychiatric: She has a normal mood and affect. Her behavior is normal. Judgment and thought content normal.   Vitals reviewed.      Assessment/Plan   Demi was seen today for skin lesion.    Diagnoses and all orders for this visit:    Neoplasm of uncertain behavior of skin of cheek  -     Case Request; Standing  -     Comprehensive Metabolic Panel; Future  -     CBC and Differential; Future  -     Protime-INR; Future  -     APTT; Future  -     ECG 12 Lead; Future  -     XR Chest 2 View; Future  -     Case Request    Anticoagulated  -     Case Request; Standing  -     Comprehensive Metabolic Panel; Future  -     CBC and Differential; Future  -     Protime-INR; Future  -     APTT; Future  -     ECG 12 Lead; Future  -     XR Chest 2 View; Future  -     Case Request    Other orders  -     Follow Anesthesia Guidelines / Standing Orders; Future  -     Obtain Informed Consent  -     Follow Anesthesia Guidelines / Standing Orders; Standing  -     Verify NPO Status; Standing  -     Obtain Informed Consent; Standing  -     SCD (Sequential Compression Device) - To Be Placed on Patient in Pre-Op; Standing  -     NPO Diet; Standing  -     Provide Patient With Instructions on NPO Status; Future  -     Patient to Void Prior to Transfer to OR; Standing      EXCISION NEOPLASM OF UNCERTAIN BEHAVIOR OF LEFT LATERAL MALAR CHEEK WITH FROZEN SECTION AND POSSIBLE FLAP (Left)  Orders Placed This Encounter   Procedures   • XR Chest 2 View     Standing Status:   Future     Standing Expiration Date:   10/16/2020     Order Specific Question:   Reason for Exam:     Answer:   HYPERTENSION   • Comprehensive Metabolic Panel     Standing Status:   Future     Standing Expiration Date:   10/16/2020   • Protime-INR     Standing Status:   Future     Standing Expiration Date:   10/16/2020   • APTT     Standing Status:   Future     Standing Expiration Date:   10/16/2020   • Follow Anesthesia Guidelines / Standing Orders      Standing Status:   Future     Standing Expiration Date:   10/17/2020   • Obtain Informed Consent     EXCISION LESION with possible flap or graft-     Order Specific Question:   Informed Consent Given For     Answer:   EXCISION NEOPLASM OF UNCERTAIN BEHAVIOR OF LEFT LATERAL MALAR CHEEK WITH FROZEN SECTION AND POSSIBLE FLAP   • Provide Patient With Instructions on NPO Status     Standing Status:   Future   • ECG 12 Lead     Standing Status:   Future     Standing Expiration Date:   10/16/2020     Order Specific Question:   Reason for Exam:     Answer:   HYPERTENSION   • CBC and Differential     Standing Status:   Future     Standing Expiration Date:   10/16/2020     Order Specific Question:   Manual Differential     Answer:   No     Return for Follow-up post-operatively as directed.       Patient Instructions   Discussion of skin lesion. Discussed risks, benefits, alternatives, and possible complications of excision of the skin lesion with reconstruction utilizing local tissue rearrangement, full-thickness skin grafting, or local interpolated flaps. Risks include, but are not limited too: bleeding, infection, hematoma, recurrence, need for additional procedures, flap failure, cosmetic deformity. Patient understands risks and would like to proceed with surgery.     Patient will need to stop aspirin 7 days prior to surgery.           Yes

## 2019-10-17 ENCOUNTER — OFFICE VISIT (OUTPATIENT)
Dept: OTOLARYNGOLOGY | Facility: CLINIC | Age: 64
End: 2019-10-17

## 2019-10-17 VITALS
SYSTOLIC BLOOD PRESSURE: 153 MMHG | HEIGHT: 64 IN | BODY MASS INDEX: 43.02 KG/M2 | DIASTOLIC BLOOD PRESSURE: 90 MMHG | HEART RATE: 69 BPM | WEIGHT: 252 LBS | TEMPERATURE: 98.5 F

## 2019-10-17 DIAGNOSIS — Z79.01 ANTICOAGULATED: ICD-10-CM

## 2019-10-17 DIAGNOSIS — D48.5 NEOPLASM OF UNCERTAIN BEHAVIOR OF SKIN OF CHEEK: Primary | ICD-10-CM

## 2019-10-17 PROCEDURE — 99203 OFFICE O/P NEW LOW 30 MIN: CPT | Performed by: PHYSICIAN ASSISTANT

## 2019-10-17 RX ORDER — ESCITALOPRAM OXALATE 10 MG/1
TABLET ORAL DAILY PRN
COMMUNITY
Start: 2019-09-26

## 2019-10-17 RX ORDER — ALPRAZOLAM 0.25 MG/1
0.25 TABLET ORAL 3 TIMES DAILY PRN
Refills: 0 | COMMUNITY
Start: 2019-10-03

## 2019-10-17 NOTE — PATIENT INSTRUCTIONS
Discussion of skin lesion. Discussed risks, benefits, alternatives, and possible complications of excision of the skin lesion with reconstruction utilizing local tissue rearrangement, full-thickness skin grafting, or local interpolated flaps. Risks include, but are not limited too: bleeding, infection, hematoma, recurrence, need for additional procedures, flap failure, cosmetic deformity. Patient understands risks and would like to proceed with surgery.     Patient will need to stop aspirin 7 days prior to surgery.

## 2019-10-30 ENCOUNTER — APPOINTMENT (OUTPATIENT)
Dept: PREADMISSION TESTING | Facility: HOSPITAL | Age: 64
End: 2019-10-30

## 2019-10-30 ENCOUNTER — HOSPITAL ENCOUNTER (OUTPATIENT)
Dept: GENERAL RADIOLOGY | Facility: HOSPITAL | Age: 64
Discharge: HOME | End: 2019-10-30
Admitting: PHYSICIAN ASSISTANT

## 2019-10-30 VITALS
BODY MASS INDEX: 41.58 KG/M2 | HEART RATE: 56 BPM | OXYGEN SATURATION: 99 % | WEIGHT: 249.56 LBS | DIASTOLIC BLOOD PRESSURE: 68 MMHG | SYSTOLIC BLOOD PRESSURE: 132 MMHG | HEIGHT: 65 IN

## 2019-10-30 DIAGNOSIS — D48.5 NEOPLASM OF UNCERTAIN BEHAVIOR OF SKIN OF CHEEK: ICD-10-CM

## 2019-10-30 DIAGNOSIS — Z79.01 ANTICOAGULATED: ICD-10-CM

## 2019-10-30 LAB
ALBUMIN SERPL-MCNC: 4.2 G/DL (ref 3.5–5.2)
ALBUMIN/GLOB SERPL: 1.6 G/DL
ALP SERPL-CCNC: 68 U/L (ref 39–117)
ALT SERPL W P-5'-P-CCNC: 10 U/L (ref 1–33)
ANION GAP SERPL CALCULATED.3IONS-SCNC: 11 MMOL/L (ref 5–15)
APTT PPP: 29.5 SECONDS (ref 24.1–35)
AST SERPL-CCNC: 12 U/L (ref 1–32)
BASOPHILS # BLD AUTO: 0.04 10*3/MM3 (ref 0–0.2)
BASOPHILS NFR BLD AUTO: 0.5 % (ref 0–1.5)
BILIRUB SERPL-MCNC: 0.6 MG/DL (ref 0.2–1.2)
BUN BLD-MCNC: 20 MG/DL (ref 8–23)
BUN/CREAT SERPL: 25.3 (ref 7–25)
CALCIUM SPEC-SCNC: 9.2 MG/DL (ref 8.6–10.5)
CHLORIDE SERPL-SCNC: 104 MMOL/L (ref 98–107)
CO2 SERPL-SCNC: 28 MMOL/L (ref 22–29)
CREAT BLD-MCNC: 0.79 MG/DL (ref 0.57–1)
DEPRECATED RDW RBC AUTO: 47.8 FL (ref 37–54)
EOSINOPHIL # BLD AUTO: 0.14 10*3/MM3 (ref 0–0.4)
EOSINOPHIL NFR BLD AUTO: 1.8 % (ref 0.3–6.2)
ERYTHROCYTE [DISTWIDTH] IN BLOOD BY AUTOMATED COUNT: 16.8 % (ref 12.3–15.4)
GFR SERPL CREATININE-BSD FRML MDRD: 74 ML/MIN/1.73
GLOBULIN UR ELPH-MCNC: 2.7 GM/DL
GLUCOSE BLD-MCNC: 105 MG/DL (ref 65–99)
HCT VFR BLD AUTO: 38.7 % (ref 34–46.6)
HGB BLD-MCNC: 12.3 G/DL (ref 12–15.9)
IMM GRANULOCYTES # BLD AUTO: 0.02 10*3/MM3 (ref 0–0.05)
IMM GRANULOCYTES NFR BLD AUTO: 0.3 % (ref 0–0.5)
INR PPP: 0.91 (ref 0.91–1.09)
LYMPHOCYTES # BLD AUTO: 2.08 10*3/MM3 (ref 0.7–3.1)
LYMPHOCYTES NFR BLD AUTO: 26.6 % (ref 19.6–45.3)
MCH RBC QN AUTO: 25 PG (ref 26.6–33)
MCHC RBC AUTO-ENTMCNC: 31.8 G/DL (ref 31.5–35.7)
MCV RBC AUTO: 78.7 FL (ref 79–97)
MONOCYTES # BLD AUTO: 0.63 10*3/MM3 (ref 0.1–0.9)
MONOCYTES NFR BLD AUTO: 8.1 % (ref 5–12)
NEUTROPHILS # BLD AUTO: 4.91 10*3/MM3 (ref 1.7–7)
NEUTROPHILS NFR BLD AUTO: 62.7 % (ref 42.7–76)
NRBC BLD AUTO-RTO: 0 /100 WBC (ref 0–0.2)
PLATELET # BLD AUTO: 382 10*3/MM3 (ref 140–450)
PMV BLD AUTO: 10 FL (ref 6–12)
POTASSIUM BLD-SCNC: 4.3 MMOL/L (ref 3.5–5.2)
PROT SERPL-MCNC: 6.9 G/DL (ref 6–8.5)
PROTHROMBIN TIME: 12.5 SECONDS (ref 11.9–14.6)
RBC # BLD AUTO: 4.92 10*6/MM3 (ref 3.77–5.28)
SODIUM BLD-SCNC: 143 MMOL/L (ref 136–145)
WBC NRBC COR # BLD: 7.82 10*3/MM3 (ref 3.4–10.8)

## 2019-10-30 PROCEDURE — 85025 COMPLETE CBC W/AUTO DIFF WBC: CPT | Performed by: PHYSICIAN ASSISTANT

## 2019-10-30 PROCEDURE — 36415 COLL VENOUS BLD VENIPUNCTURE: CPT

## 2019-10-30 PROCEDURE — 93005 ELECTROCARDIOGRAM TRACING: CPT

## 2019-10-30 PROCEDURE — 85730 THROMBOPLASTIN TIME PARTIAL: CPT | Performed by: PHYSICIAN ASSISTANT

## 2019-10-30 PROCEDURE — 80053 COMPREHEN METABOLIC PANEL: CPT | Performed by: PHYSICIAN ASSISTANT

## 2019-10-30 PROCEDURE — 71046 X-RAY EXAM CHEST 2 VIEWS: CPT

## 2019-10-30 PROCEDURE — 85610 PROTHROMBIN TIME: CPT | Performed by: PHYSICIAN ASSISTANT

## 2019-10-30 PROCEDURE — 93010 ELECTROCARDIOGRAM REPORT: CPT | Performed by: INTERNAL MEDICINE

## 2019-11-06 ENCOUNTER — HOSPITAL ENCOUNTER (OUTPATIENT)
Facility: HOSPITAL | Age: 64
Setting detail: HOSPITAL OUTPATIENT SURGERY
Discharge: HOME | End: 2019-11-06
Attending: OTOLARYNGOLOGY | Admitting: OTOLARYNGOLOGY

## 2019-11-06 ENCOUNTER — ANESTHESIA (OUTPATIENT)
Dept: PERIOP | Facility: HOSPITAL | Age: 64
End: 2019-11-06

## 2019-11-06 ENCOUNTER — ANESTHESIA EVENT (OUTPATIENT)
Dept: PERIOP | Facility: HOSPITAL | Age: 64
End: 2019-11-06

## 2019-11-06 VITALS
SYSTOLIC BLOOD PRESSURE: 132 MMHG | OXYGEN SATURATION: 93 % | RESPIRATION RATE: 16 BRPM | DIASTOLIC BLOOD PRESSURE: 48 MMHG | TEMPERATURE: 98.6 F | HEART RATE: 48 BPM

## 2019-11-06 DIAGNOSIS — Z79.01 ANTICOAGULATED: ICD-10-CM

## 2019-11-06 DIAGNOSIS — D48.5 NEOPLASM OF UNCERTAIN BEHAVIOR OF SKIN OF CHEEK: ICD-10-CM

## 2019-11-06 PROCEDURE — 25010000002 MIDAZOLAM PER 1 MG: Performed by: ANESTHESIOLOGY

## 2019-11-06 PROCEDURE — 14040 TIS TRNFR F/C/C/M/N/A/G/H/F: CPT | Performed by: OTOLARYNGOLOGY

## 2019-11-06 PROCEDURE — 88305 TISSUE EXAM BY PATHOLOGIST: CPT | Performed by: OTOLARYNGOLOGY

## 2019-11-06 RX ORDER — DEXMEDETOMIDINE HYDROCHLORIDE 100 UG/ML
INJECTION, SOLUTION INTRAVENOUS AS NEEDED
Status: DISCONTINUED | OUTPATIENT
Start: 2019-11-06 | End: 2019-11-06 | Stop reason: SURG

## 2019-11-06 RX ORDER — SODIUM CHLORIDE, SODIUM LACTATE, POTASSIUM CHLORIDE, CALCIUM CHLORIDE 600; 310; 30; 20 MG/100ML; MG/100ML; MG/100ML; MG/100ML
1000 INJECTION, SOLUTION INTRAVENOUS CONTINUOUS
Status: DISCONTINUED | OUTPATIENT
Start: 2019-11-06 | End: 2019-11-06 | Stop reason: HOSPADM

## 2019-11-06 RX ORDER — ONDANSETRON 4 MG/1
4 TABLET, FILM COATED ORAL ONCE AS NEEDED
Status: DISCONTINUED | OUTPATIENT
Start: 2019-11-06 | End: 2019-11-06 | Stop reason: HOSPADM

## 2019-11-06 RX ORDER — SODIUM CHLORIDE 0.9 % (FLUSH) 0.9 %
3 SYRINGE (ML) INJECTION EVERY 12 HOURS SCHEDULED
Status: DISCONTINUED | OUTPATIENT
Start: 2019-11-06 | End: 2019-11-06 | Stop reason: HOSPADM

## 2019-11-06 RX ORDER — HYDROCODONE BITARTRATE AND ACETAMINOPHEN 5; 325 MG/1; MG/1
1 TABLET ORAL ONCE AS NEEDED
Status: DISCONTINUED | OUTPATIENT
Start: 2019-11-06 | End: 2019-11-06 | Stop reason: HOSPADM

## 2019-11-06 RX ORDER — OXYCODONE AND ACETAMINOPHEN 10; 325 MG/1; MG/1
1 TABLET ORAL ONCE AS NEEDED
Status: DISCONTINUED | OUTPATIENT
Start: 2019-11-06 | End: 2019-11-06 | Stop reason: HOSPADM

## 2019-11-06 RX ORDER — SODIUM CHLORIDE 0.9 % (FLUSH) 0.9 %
3 SYRINGE (ML) INJECTION AS NEEDED
Status: DISCONTINUED | OUTPATIENT
Start: 2019-11-06 | End: 2019-11-06 | Stop reason: HOSPADM

## 2019-11-06 RX ORDER — HYDROCODONE BITARTRATE AND ACETAMINOPHEN 5; 325 MG/1; MG/1
1 TABLET ORAL EVERY 4 HOURS PRN
Qty: 6 TABLET | Refills: 0 | Status: SHIPPED | OUTPATIENT
Start: 2019-11-06

## 2019-11-06 RX ORDER — IBUPROFEN 600 MG/1
600 TABLET ORAL ONCE AS NEEDED
Status: DISCONTINUED | OUTPATIENT
Start: 2019-11-06 | End: 2019-11-06 | Stop reason: HOSPADM

## 2019-11-06 RX ORDER — MIDAZOLAM HYDROCHLORIDE 1 MG/ML
2 INJECTION INTRAMUSCULAR; INTRAVENOUS
Status: DISCONTINUED | OUTPATIENT
Start: 2019-11-06 | End: 2019-11-06 | Stop reason: HOSPADM

## 2019-11-06 RX ORDER — ONDANSETRON 2 MG/ML
4 INJECTION INTRAMUSCULAR; INTRAVENOUS ONCE AS NEEDED
Status: DISCONTINUED | OUTPATIENT
Start: 2019-11-06 | End: 2019-11-06 | Stop reason: HOSPADM

## 2019-11-06 RX ORDER — LIDOCAINE HYDROCHLORIDE AND EPINEPHRINE 10; 10 MG/ML; UG/ML
INJECTION, SOLUTION INFILTRATION; PERINEURAL AS NEEDED
Status: DISCONTINUED | OUTPATIENT
Start: 2019-11-06 | End: 2019-11-06 | Stop reason: HOSPADM

## 2019-11-06 RX ORDER — IPRATROPIUM BROMIDE AND ALBUTEROL SULFATE 2.5; .5 MG/3ML; MG/3ML
3 SOLUTION RESPIRATORY (INHALATION) ONCE AS NEEDED
Status: DISCONTINUED | OUTPATIENT
Start: 2019-11-06 | End: 2019-11-06 | Stop reason: HOSPADM

## 2019-11-06 RX ORDER — MIDAZOLAM HYDROCHLORIDE 1 MG/ML
1 INJECTION INTRAMUSCULAR; INTRAVENOUS
Status: DISCONTINUED | OUTPATIENT
Start: 2019-11-06 | End: 2019-11-06 | Stop reason: HOSPADM

## 2019-11-06 RX ORDER — LABETALOL HYDROCHLORIDE 5 MG/ML
5 INJECTION, SOLUTION INTRAVENOUS
Status: DISCONTINUED | OUTPATIENT
Start: 2019-11-06 | End: 2019-11-06 | Stop reason: HOSPADM

## 2019-11-06 RX ORDER — SODIUM CHLORIDE 0.9 % (FLUSH) 0.9 %
3-10 SYRINGE (ML) INJECTION AS NEEDED
Status: DISCONTINUED | OUTPATIENT
Start: 2019-11-06 | End: 2019-11-06 | Stop reason: HOSPADM

## 2019-11-06 RX ORDER — SODIUM CHLORIDE, SODIUM LACTATE, POTASSIUM CHLORIDE, CALCIUM CHLORIDE 600; 310; 30; 20 MG/100ML; MG/100ML; MG/100ML; MG/100ML
100 INJECTION, SOLUTION INTRAVENOUS CONTINUOUS
Status: DISCONTINUED | OUTPATIENT
Start: 2019-11-06 | End: 2019-11-06 | Stop reason: HOSPADM

## 2019-11-06 RX ORDER — MAGNESIUM HYDROXIDE 1200 MG/15ML
LIQUID ORAL AS NEEDED
Status: DISCONTINUED | OUTPATIENT
Start: 2019-11-06 | End: 2019-11-06 | Stop reason: HOSPADM

## 2019-11-06 RX ORDER — NALOXONE HCL 0.4 MG/ML
0.4 VIAL (ML) INJECTION AS NEEDED
Status: DISCONTINUED | OUTPATIENT
Start: 2019-11-06 | End: 2019-11-06 | Stop reason: HOSPADM

## 2019-11-06 RX ORDER — METOCLOPRAMIDE HYDROCHLORIDE 5 MG/ML
5 INJECTION INTRAMUSCULAR; INTRAVENOUS
Status: DISCONTINUED | OUTPATIENT
Start: 2019-11-06 | End: 2019-11-06 | Stop reason: HOSPADM

## 2019-11-06 RX ORDER — OXYCODONE AND ACETAMINOPHEN 7.5; 325 MG/1; MG/1
2 TABLET ORAL EVERY 4 HOURS PRN
Status: DISCONTINUED | OUTPATIENT
Start: 2019-11-06 | End: 2019-11-06 | Stop reason: HOSPADM

## 2019-11-06 RX ORDER — FENTANYL CITRATE 50 UG/ML
25 INJECTION, SOLUTION INTRAMUSCULAR; INTRAVENOUS AS NEEDED
Status: DISCONTINUED | OUTPATIENT
Start: 2019-11-06 | End: 2019-11-06 | Stop reason: HOSPADM

## 2019-11-06 RX ADMIN — DEXMEDETOMIDINE 10 MCG: 100 INJECTION, SOLUTION, CONCENTRATE INTRAVENOUS at 13:43

## 2019-11-06 RX ADMIN — DEXMEDETOMIDINE 10 MCG: 100 INJECTION, SOLUTION, CONCENTRATE INTRAVENOUS at 13:35

## 2019-11-06 RX ADMIN — DEXMEDETOMIDINE 10 MCG: 100 INJECTION, SOLUTION, CONCENTRATE INTRAVENOUS at 13:53

## 2019-11-06 RX ADMIN — DEXMEDETOMIDINE 30 MCG: 100 INJECTION, SOLUTION, CONCENTRATE INTRAVENOUS at 13:32

## 2019-11-06 RX ADMIN — SODIUM CHLORIDE, POTASSIUM CHLORIDE, SODIUM LACTATE AND CALCIUM CHLORIDE 1000 ML: 600; 310; 30; 20 INJECTION, SOLUTION INTRAVENOUS at 10:31

## 2019-11-06 RX ADMIN — DEXMEDETOMIDINE 10 MCG: 100 INJECTION, SOLUTION, CONCENTRATE INTRAVENOUS at 13:48

## 2019-11-06 RX ADMIN — DEXMEDETOMIDINE 10 MCG: 100 INJECTION, SOLUTION, CONCENTRATE INTRAVENOUS at 13:33

## 2019-11-06 RX ADMIN — MIDAZOLAM HYDROCHLORIDE 2 MG: 1 INJECTION, SOLUTION INTRAMUSCULAR; INTRAVENOUS at 12:30

## 2019-11-06 RX ADMIN — DEXMEDETOMIDINE 10 MCG: 100 INJECTION, SOLUTION, CONCENTRATE INTRAVENOUS at 13:39

## 2019-11-06 RX ADMIN — DEXMEDETOMIDINE 20 MCG: 100 INJECTION, SOLUTION, CONCENTRATE INTRAVENOUS at 13:31

## 2019-11-06 NOTE — ANESTHESIA PREPROCEDURE EVALUATION
Anesthesia Evaluation     history of anesthetic complications: PONV  NPO Solid Status: > 8 hours  NPO Liquid Status: > 8 hours           Airway   Mallampati: I  TM distance: >3 FB  Neck ROM: full  No difficulty expected  Dental      Pulmonary    (-) asthma, not a smoker  Cardiovascular   Exercise tolerance: good (4-7 METS)    Patient on routine beta blocker and Beta blocker given within 24 hours of surgery    (+) hypertension, hyperlipidemia,   (-) past MI, CAD      Neuro/Psych  (+) psychiatric history Anxiety,     (-) seizures, TIA, CVA  GI/Hepatic/Renal/Endo    (+) morbid obesity, GERD,  renal disease stones, thyroid problem hypothyroidism    Musculoskeletal     Abdominal    Substance History      OB/GYN          Other      history of cancer (breast cancer in remission) remission                  Anesthesia Plan    ASA 3     MAC     intravenous induction     Anesthetic plan, all risks, benefits, and alternatives have been provided, discussed and informed consent has been obtained with: patient.

## 2019-11-06 NOTE — ANESTHESIA POSTPROCEDURE EVALUATION
Patient: Demi Morataya    Procedure Summary     Date:  11/06/19 [facility] / [facility]:   [facility]    Anesthesia Start:  1331 Anesthesia Stop:  1411    Procedures:       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 with transposition flap for closure (Left )      POSSIBLE FLAP (N/A ) Diagnosis:       Neoplasm of uncertain behavior of skin of cheek      Anticoagulated      (Neoplasm of uncertain behavior of skin of cheek [D48.5])      (Anticoagulated [Z79.01])    Surgeon:  Lamine Aguiar MD Provider:  Hollis Crouch CRNA    Anesthesia Type:  MAC ASA Status:  3          Anesthesia Type: MAC  Last vitals  BP   133/55 (11/06/19 1414)   Temp   98.6 °F (37 °C) (11/06/19 1409)   Pulse   58 (11/06/19 1414)   Resp   15 (11/06/19 1414)     SpO2   91 % (11/06/19 1414)     Post Anesthesia Care and Evaluation    Patient location during evaluation: PHASE II  Patient participation: complete - patient participated  Level of consciousness: awake and alert  Pain management: adequate  Airway patency: patent  Anesthetic complications: No anesthetic complications  PONV Status: none  Cardiovascular status: acceptable and stable  Respiratory status: acceptable and unassisted  Hydration status: acceptable

## 2019-11-06 NOTE — OP NOTE
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 female with Neoplasm of uncertain behavior of skin of cheek [D48.5]  Anticoagulated [Z79.01]    PROCEDURE:  The patient was brought to the operating room, given MAC and Local Anesthesia with 1% Xylocaine with Epinephrine 1:100,000, and prepped and draped in the usual manner.    Approximately 9mL 1% Xylocaine with epinephrine was injected in the planned excision site in the left cheek.  Excision was accomplished with a #15 blade in a rhomboid fashion without difficulty.  The excision was approximately 1.7 cm  x 1.5 cm.  The lesion was approximately 1.5 cm x 0.6 cm.  The margin was 1 mm. Upon excision the specimen was marked and submitted for permanent pathologic examination.    An inferiorly based transposition flap was fashioned and wide undermining performed with curved iris scissors and double prong skin hooks.  Minimal bleeding was encountered which was controlled with electrocautery and low settings.  The flap was created to preserve normal anatomic relationships and to facilitate closure.  The flap was transposed and the donor site as well as the flap sutured in place utilizing interrupted 4-0 Monocryl subcutaneously and interrupted 5-0 nylon to reapproximate the epidermis.  Bactroban ointment was applied and the procedure terminated.     The patient  tolerated the procedure well without complications and was transported to the postanesthesia care unit in stable condition.    SPECIMENS:  A: Neoplasm of uncertain behavior of the left cheek    COMPLICATIONS: NONE    ESTIMATED BLOOD LOSS:  Minimal    Lamine Aguiar MD  11/6/2019

## 2019-11-06 NOTE — DISCHARGE INSTRUCTIONS
YOUR NEXT PAIN MEDICATION IS DUE AT______________        Moderate Conscious Sedation, Adult, Care After  Refer to this sheet in the next few weeks. These instructions provide you with information on caring for yourself after your procedure. Your health care provider may also give you more specific instructions. Your treatment has been planned according to current medical practices, but problems sometimes occur. Call your health care provider if you have any problems or questions after your procedure.  WHAT TO EXPECT AFTER THE PROCEDURE    After your procedure:  · You may feel sleepy, clumsy, and have poor balance for several hours.  · Vomiting may occur if you eat too soon after the procedure.  HOME CARE INSTRUCTIONS  · Do not participate in any activities where you could become injured for at least 24 hours. Do not:  ¨ Drive.  ¨ Swim.  ¨ Ride a bicycle.  ¨ Operate heavy machinery.  ¨ Cook.  ¨ Use power tools.  ¨ Climb ladders.  ¨ Work from a high place.  · Do not make important decisions or sign legal documents until you are improved.  · If you vomit, drink water, juice, or soup when you can drink without vomiting. Make sure you have little or no nausea before eating solid foods.  · Only take over-the-counter or prescription medicines for pain, discomfort, or fever as directed by your health care provider.  · Make sure you and your family fully understand everything about the medicines given to you, including what side effects may occur.  · You should not drink alcohol, take sleeping pills, or take medicines that cause drowsiness for at least 24 hours.  · If you smoke, do not smoke without supervision.  · If you are feeling better, you may resume normal activities 24 hours after you were sedated.  · Keep all appointments with your health care provider.  SEEK MEDICAL CARE IF:  · Your skin is pale or bluish in color.  · You continue to feel nauseous or vomit.  · Your pain is getting worse and is not helped by  medicine.  · You have bleeding or swelling.  · You are still sleepy or feeling clumsy after 24 hours.  SEEK IMMEDIATE MEDICAL CARE IF:  · You develop a rash.  · You have difficulty breathing.  · You develop any type of allergic problem.  · You have a fever.  MAKE SURE YOU:  · Understand these instructions.  · Will watch your condition.  · Will get help right away if you are not doing well or get worse.     This information is not intended to replace advice given to you by your health care provider. Make sure you discuss any questions you have with your health care provider.     Document Released: 10/08/2014 Document Revised: 01/08/2016 Document Reviewed: 10/08/2014  [employer] Interactive Patient Education ©2016 [employer]         CALL YOUR PHYSICIAN IF YOU EXPERIENCE  INCREASED PAIN NOT HELPED BY YOUR PAIN MEDICATION.        Fall Prevention in the Home      Falls can cause injuries. They can happen to people of all ages. There are many things you can do to make your home safe and to help prevent falls.    WHAT CAN I DO ON THE OUTSIDE OF MY HOME?  · Regularly fix the edges of walkways and driveways and fix any cracks.  · Remove anything that might make you trip as you walk through a door, such as a raised step or threshold.  · Trim any bushes or trees on the path to your home.  · Use bright outdoor lighting.  · Clear any walking paths of anything that might make someone trip, such as rocks or tools.  · Regularly check to see if handrails are loose or broken. Make sure that both sides of any steps have handrails.  · Any raised decks and porches should have guardrails on the edges.  · Have any leaves, snow, or ice cleared regularly.  · Use sand or salt on walking paths during winter.  · Clean up any spills in your garage right away. This includes oil or grease spills.  WHAT CAN I DO IN THE BATHROOM?    · Use night lights.  · Install grab bars by the toilet and in the tub and shower. Do not use towel bars as grab  bars.  · Use non-skid mats or decals in the tub or shower.  · If you need to sit down in the shower, use a plastic, non-slip stool.  · Keep the floor dry. Clean up any water that spills on the floor as soon as it happens.  · Remove soap buildup in the tub or shower regularly.  · Attach bath mats securely with double-sided non-slip rug tape.  · Do not have throw rugs and other things on the floor that can make you trip.  WHAT CAN I DO IN THE BEDROOM?  · Use night lights.  · Make sure that you have a light by your bed that is easy to reach.  · Do not use any sheets or blankets that are too big for your bed. They should not hang down onto the floor.  · Have a firm chair that has side arms. You can use this for support while you get dressed.  · Do not have throw rugs and other things on the floor that can make you trip.  WHAT CAN I DO IN THE KITCHEN?  · Clean up any spills right away.  · Avoid walking on wet floors.  · Keep items that you use a lot in easy-to-reach places.  · If you need to reach something above you, use a strong step stool that has a grab bar.  · Keep electrical cords out of the way.  · Do not use floor polish or wax that makes floors slippery. If you must use wax, use non-skid floor wax.  · Do not have throw rugs and other things on the floor that can make you trip.  WHAT CAN I DO WITH MY STAIRS?  · Do not leave any items on the stairs.  · Make sure that there are handrails on both sides of the stairs and use them. Fix handrails that are broken or loose. Make sure that handrails are as long as the stairways.  · Check any carpeting to make sure that it is firmly attached to the stairs. Fix any carpet that is loose or worn.  · Avoid having throw rugs at the top or bottom of the stairs. If you do have throw rugs, attach them to the floor with carpet tape.  · Make sure that you have a light switch at the top of the stairs and the bottom of the stairs. If you do not have them, ask someone to add them for  you.  WHAT ELSE CAN I DO TO HELP PREVENT FALLS?  · Wear shoes that:  ¨ Do not have high heels.  ¨ Have rubber bottoms.  ¨ Are comfortable and fit you well.  ¨ Are closed at the toe. Do not wear sandals.  · If you use a stepladder:  ¨ Make sure that it is fully opened. Do not climb a closed stepladder.  ¨ Make sure that both sides of the stepladder are locked into place.  ¨ Ask someone to hold it for you, if possible.  · Clearly krystal and make sure that you can see:  ¨ Any grab bars or handrails.  ¨ First and last steps.  ¨ Where the edge of each step is.  · Use tools that help you move around (mobility aids) if they are needed. These include:  ¨ Canes.  ¨ Walkers.  ¨ Scooters.  ¨ Crutches.  · Turn on the lights when you go into a dark area. Replace any light bulbs as soon as they burn out.  · Set up your furniture so you have a clear path. Avoid moving your furniture around.  · If any of your floors are uneven, fix them.  · If there are any pets around you, be aware of where they are.  · Review your medicines with your doctor. Some medicines can make you feel dizzy. This can increase your chance of falling.  Ask your doctor what other things that you can do to help prevent falls.     This information is not intended to replace advice given to you by your health care provider. Make sure you discuss any questions you have with your health care provider.     Document Released: 10/14/2010 Document Revised: 05/03/2016 Document Reviewed: 01/22/2016  Elsevier Interactive Patient Education ©2016 [employer]     PATIENT/FAMILY/RESPONSIBLE PARTY VERBALIZES UNDERSTANDING OF ABOVE EDUCATION.  COPY OF PAIN SCALE GIVEN AND REVIEWED WITH VERBALIZED UNDERSTANDING.

## 2019-11-08 LAB
CYTO UR: NORMAL
LAB AP CASE REPORT: NORMAL
PATH REPORT.FINAL DX SPEC: NORMAL
PATH REPORT.GROSS SPEC: NORMAL

## 2019-11-15 ENCOUNTER — OFFICE VISIT (OUTPATIENT)
Dept: OTOLARYNGOLOGY | Facility: CLINIC | Age: 64
End: 2019-11-15

## 2019-11-15 DIAGNOSIS — L81.4 MELANOSIS: Primary | ICD-10-CM

## 2019-11-15 DIAGNOSIS — L57.8 SOLAR ELASTOSIS: ICD-10-CM

## 2019-11-15 PROCEDURE — 99024 POSTOP FOLLOW-UP VISIT: CPT | Performed by: OTOLARYNGOLOGY

## 2019-11-15 NOTE — PROGRESS NOTES
Procedure   Suture Removal  Date/Time: 11/15/2019 10:15 AM  Performed by: Carlene Corado RN  Authorized by: Lamine Aguiar MD   Consent: Verbal consent obtained.  Consent given by: patient  Patient identity confirmed: verbally with patient  Body area: head/neck  Location details: left cheek  Comments: Patient present for suture removal. The incision is well-approximated with no redness or edema noted. Patient notified of path

## 2020-02-19 ENCOUNTER — OFFICE VISIT (OUTPATIENT)
Dept: OTOLARYNGOLOGY | Facility: CLINIC | Age: 65
End: 2020-02-19

## 2020-02-19 VITALS
DIASTOLIC BLOOD PRESSURE: 86 MMHG | SYSTOLIC BLOOD PRESSURE: 140 MMHG | HEIGHT: 64 IN | HEART RATE: 53 BPM | RESPIRATION RATE: 16 BRPM | WEIGHT: 254.6 LBS | TEMPERATURE: 97.6 F | BODY MASS INDEX: 43.46 KG/M2

## 2020-02-19 DIAGNOSIS — Z98.890 H/O LOCAL EXCISION OF SKIN LESION: Primary | ICD-10-CM

## 2020-02-19 PROCEDURE — 99213 OFFICE O/P EST LOW 20 MIN: CPT | Performed by: PHYSICIAN ASSISTANT

## 2020-02-19 NOTE — PATIENT INSTRUCTIONS
Protect the incisions from sunlight. Sunlight to the incisions will cause permanent pigmentation to the incision line and make the incision more noticeable. After the incision has reepithelialized (typically 2-3 weeks after the procedure), you may begin to use sunscreen with an SPF of 15 or greater    I discussed the use of a silicone-based wound cream to the incisions to optimize the end result. Apply topically twice daily, or as directed, to help optimize wound healing and decrease redness.    We discussed the importance of routine skin checks with a dermatologist or your PCP every 6-12 months.

## 2020-02-19 NOTE — PROGRESS NOTES
NATALIO Quiñones     Chief Complaint   Patient presents with   • fu skin lesion        HISTORY OF PRESENT ILLNESS:     Demi Morataya is a  64 y.o.  female who presents for follow up s/p excision of a neoplasm of uncertain behavior of the left cheek with transposition flap on 11-6-19 by Dr. Aguiar. The patient has had a relatively normal postoperative course and currently has no related complaints.    Tissue Pathology Exam: [id number]   Order: [id number]   Status:  Final result   Visible to patient:  No (Not Released) Next appt:  None Dx:  Anticoagulated; Neoplasm of uncertain...   Specimen Information: Cheek, Left; Tissue        Component    Case Report   Surgical Pathology Report                         Case: [id number]                                   Authorizing Provider:  Lamine Aguiar MD    Collected:           11/06/2019 01:42 PM           Ordering Location:     [facility] OR  Received:            11/07/2019 08:32 AM           Pathologist:           Danita Corona MD                                                           Specimen:    Cheek, Left, left lateral malar cheek superior short left laterl long                      Final Diagnosis   Skin, left malar cheek, excision:  Actinic damage  Severe solar elastosis  Melanosis  Previous biopsy site with chronic inflammation  Negative for a melanocytic proliferation  Negative for invasive carcinoma   Electronically signed by Danita Corona MD on 11/8/2019 at 1124             Review of Systems   Constitutional: Negative for activity change, appetite change, chills, diaphoresis, fatigue, fever and unexpected weight change.   HENT: Negative for congestion, dental problem, drooling, ear discharge, ear pain, facial swelling, hearing loss, mouth sores, nosebleeds, postnasal drip, rhinorrhea, sinus pressure, sneezing, sore throat, tinnitus, trouble swallowing and voice change.    Eyes: Negative.    Respiratory: Negative.     Cardiovascular: Negative.    Gastrointestinal: Negative.    Endocrine: Negative.    Skin: Negative.         S/p excision of left cheek lesion   Allergic/Immunologic: Negative for environmental allergies, food allergies and immunocompromised state.   Neurological: Negative.    Hematological: Negative.    Psychiatric/Behavioral: Negative.    :    Past History:  Past Medical History:   Diagnosis Date   • Acid reflux    • Anxiety    • Breast cancer (CMS/HCC)    • Disease of thyroid gland    • High cholesterol    • History of chemotherapy    • Hypertension    • Kidney stone    • Lymphedema of left arm    • Panic attacks    • PONV (postoperative nausea and vomiting)      Past Surgical History:   Procedure Laterality Date   • BONE CYST EXCISION Left 1981    CYST REMOVED FROM LEFT RING FINGER, GRAFT TAKEN FROM RIGHT HIP   • BREAST RECONSTRUCTION Bilateral 2010   • CHOLECYSTECTOMY  2005   • EXTRACORPOREAL SHOCK WAVE LITHOTRIPSY (ESWL) Left 7/31/2017    Procedure: EXTRACORPOREAL SHOCKWAVE LITHOTRIPSY;  Surgeon: Hollis Garcia MD;  Location:  [facility] OR;  Service:    • FLAP HEAD/NECK N/A 11/6/2019    Procedure: POSSIBLE FLAP;  Surgeon: Lamine Aguiar MD;  Location:  [facility];  Service: ENT   • HEAD/NECK LESION/CYST EXCISION Left 11/6/2019    Procedure: 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 with transposition flap for closure;  Surgeon: Lamine Aguiar MD;  Location:  [facility];  Service: ENT   • HYSTERECTOMY  2001   • MASTECTOMY Bilateral 2008   • OTHER SURGICAL HISTORY  2005    SPHINCTER OF ODDI REPAIR      Family History   Problem Relation Age of Onset   • Cancer Father    • Kidney cancer Mother      Social History     Tobacco Use   • Smoking status: Never Smoker   • Smokeless tobacco: Never Used   Substance Use Topics   • Alcohol use: No   • Drug use: No     Outpatient Medications Marked as Taking for the 2/19/20 encounter (Office Visit) with Benedicto Beal PA   Medication Sig  Dispense Refill   • ALPRAZolam (XANAX) 0.25 MG tablet Take 0.25 mg by mouth 3 (Three) Times a Day As Needed for Anxiety.  0   • aspirin 81 MG tablet Take 81 mg by mouth Daily.     • bisoprolol (ZEBeta) 5 MG tablet Take 5 mg by mouth 2 (Two) Times a Day.     • Calcium-Magnesium-Vitamin D (CALCIUM 500 PO) Take 500 mg by mouth Daily.     • escitalopram (LEXAPRO) 10 MG tablet Daily As Needed (for anxiety).     • Ezetimibe (ZETIA PO) Take 10 mg by mouth Daily.     • HYDROcodone-acetaminophen (NORCO) 5-325 MG per tablet Take 1 tablet by mouth Every 4 (Four) Hours As Needed for Moderate Pain  or Severe Pain  (Pain) for up to 8 doses. 6 tablet 0   • levothyroxine (SYNTHROID, LEVOTHROID) 50 MCG tablet Take 50 mcg by mouth Daily.     • omeprazole (priLOSEC) 20 MG capsule Take 20 mg by mouth Daily.     • Vitamin D, Cholecalciferol, (CHOLECALCIFEROL) 400 UNITS tablet Take 400 Units by mouth Daily.       Allergies:  Sulfa antibiotics; Azithromycin; Neomycin-polymyxin-gramicidin; Neosporin [neomycin-bacitracin zn-polymyx]; Other; and Penicillins          Vital Signs:   Vitals:    02/19/20 1020   BP: 140/86   Pulse: 53   Resp: 16   Temp: 97.6 °F (36.4 °C)         EXAMINATION:   CONSTITUTIONAL: well nourished, alert, oriented, in no acute distress     COMMUNICATION AND VOICE: able to communicate normally, normal voice quality    HEAD: normocephalic, no lesions, atraumatic, no tenderness, no masses     FACE: appearance normal, left cheek surgical site well healed, no tenderness, no deformities, facial motion symmetric    EYES: ocular motility normal, eyelids normal, orbits normal, no proptosis, conjunctiva normal , pupils equal, round     EARS:  Hearing: response to conversational voice normal bilaterally   External Ears: auricles without lesions    NOSE:  External Nose: structure normal, no tenderness on palpation, no nasal discharge, no lesions, no evidence of trauma, nostrils patent     ORAL:  Lips: upper and lower lips without  lesion     NECK: neck appearance normal    CHEST/RESPIRATORY: respiratory effort normal, normal breath sounds     CARDIOVASCULAR: rate and rhythm normal, extremities without cyanosis or edema      NEUROLOGIC/PSYCHIATRIC: oriented to time, place and person, mood normal, affect appropriate, CN II-XII intact grossly    RESULTS REVIEW:    I have reviewed the patients old records in the chart.       Assessment    Diagnosis Plan   1. H/O local excision of skin lesion      left cheek        Plan    Patient Instructions   Protect the incisions from sunlight. Sunlight to the incisions will cause permanent pigmentation to the incision line and make the incision more noticeable. After the incision has reepithelialized (typically 2-3 weeks after the procedure), you may begin to use sunscreen with an SPF of 15 or greater    I discussed the use of a silicone-based wound cream to the incisions to optimize the end result. Apply topically twice daily, or as directed, to help optimize wound healing and decrease redness.    We discussed the importance of routine skin checks with a dermatologist or your PCP every 6-12 months.                  Return if symptoms worsen or fail to improve.    NATALIO Quiñones  02/19/20  10:23 AM

## 2020-02-20 NOTE — PROGRESS NOTES
Lamine Aguiar MD   I have seen and/or examined Demi Morataya and have reviewed the notes, assessments, and/or procedures and I concur with this documentation.    Lamine Aguiar MD  2/20/2020  11:10 AM

## 2020-06-22 NOTE — PROGRESS NOTES
2020    TELEHEALTH EVALUATION -- Audio/Visual (During JZPEH-14 public health emergency)    Place of Service: Patient Home      PCP:  RAFAEL Garcia CNP  Referred by:  No ref. provider found      HPI:    Jazlyn Millan (:  1955) has requested an audio/video evaluation for the following concern(s):    Chief Complaint   Patient presents with    Annual Exam         Patient seen for annual checkup and medication refills. She is followed for chronic GERD and Reza's esophagus. BE diagnosed in 2018 with biopsy neg for dysplasia. In 2019 a f/u EGD was completed. Biopsies neg for dysplasia. 3 yr repeat surveillance EGD recommended. Currently treated with omeprazole 20 mg daily for acid control. Pt denies any heartburn, reflux, nausea, dysphagia, melena. She gets rx for omeprazole from her pcp. She has had recent checkup with him with labs and told no anemia. She denies weight loss. ASSESSMENT/PLAN:    1. Chronic GERD    2. Reza's esophagus determined by biopsy      EGD for surveillance of Reza's due in   Colonoscopy recall also due in . F/u in office prn    Review of Systems   Constitutional: Negative for appetite change, fever and unexpected weight change. HENT: Negative for sore throat and voice change. Respiratory: Negative for cough, chest tightness and shortness of breath. Cardiovascular: Negative for chest pain, palpitations and leg swelling. Gastrointestinal: Negative for abdominal distention, abdominal pain, blood in stool, constipation, diarrhea, nausea, rectal pain and vomiting. Reflux   Musculoskeletal: Negative for arthralgias, back pain and gait problem. Skin: Negative for pallor, rash and wound. Neurological: Negative for dizziness, weakness and light-headedness. Hematological: Negative for adenopathy. Does not bruise/bleed easily. All other systems reviewed and are negative.       Prior to Visit Medications    Medication Sig

## 2020-06-23 ENCOUNTER — TELEMEDICINE (OUTPATIENT)
Dept: GASTROENTEROLOGY | Age: 65
End: 2020-06-23
Payer: MEDICARE

## 2020-06-23 PROCEDURE — G8427 DOCREV CUR MEDS BY ELIG CLIN: HCPCS | Performed by: NURSE PRACTITIONER

## 2020-06-23 PROCEDURE — 99212 OFFICE O/P EST SF 10 MIN: CPT | Performed by: NURSE PRACTITIONER

## 2020-06-23 PROCEDURE — 3017F COLORECTAL CA SCREEN DOC REV: CPT | Performed by: NURSE PRACTITIONER

## 2020-06-23 ASSESSMENT — ENCOUNTER SYMPTOMS
NAUSEA: 0
CHEST TIGHTNESS: 0
RECTAL PAIN: 0
VOICE CHANGE: 0
ABDOMINAL DISTENTION: 0
COUGH: 0
VOMITING: 0
ABDOMINAL PAIN: 0
CONSTIPATION: 0
BACK PAIN: 0
SHORTNESS OF BREATH: 0
DIARRHEA: 0
BLOOD IN STOOL: 0
SORE THROAT: 0

## 2020-10-23 ENCOUNTER — OFFICE VISIT (OUTPATIENT)
Dept: OBGYN | Age: 65
End: 2020-10-23
Payer: MEDICARE

## 2020-10-23 VITALS
HEIGHT: 64 IN | HEART RATE: 55 BPM | BODY MASS INDEX: 38.41 KG/M2 | DIASTOLIC BLOOD PRESSURE: 63 MMHG | SYSTOLIC BLOOD PRESSURE: 130 MMHG | WEIGHT: 225 LBS

## 2020-10-23 PROCEDURE — G8427 DOCREV CUR MEDS BY ELIG CLIN: HCPCS | Performed by: NURSE PRACTITIONER

## 2020-10-23 PROCEDURE — 99213 OFFICE O/P EST LOW 20 MIN: CPT | Performed by: NURSE PRACTITIONER

## 2020-10-23 PROCEDURE — 3017F COLORECTAL CA SCREEN DOC REV: CPT | Performed by: NURSE PRACTITIONER

## 2020-10-23 PROCEDURE — G8484 FLU IMMUNIZE NO ADMIN: HCPCS | Performed by: NURSE PRACTITIONER

## 2020-10-23 PROCEDURE — G8417 CALC BMI ABV UP PARAM F/U: HCPCS | Performed by: NURSE PRACTITIONER

## 2020-10-23 PROCEDURE — 1036F TOBACCO NON-USER: CPT | Performed by: NURSE PRACTITIONER

## 2020-10-23 RX ORDER — ESCITALOPRAM OXALATE 10 MG/1
10 TABLET ORAL DAILY
COMMUNITY

## 2020-10-23 ASSESSMENT — ENCOUNTER SYMPTOMS
RESPIRATORY NEGATIVE: 1
GASTROINTESTINAL NEGATIVE: 1
EYES NEGATIVE: 1

## 2020-10-23 NOTE — PATIENT INSTRUCTIONS

## 2020-10-23 NOTE — PROGRESS NOTES
Ervin Gerardo is a 63 y.o. female who presents today for her medical conditions/ complaints as noted below. Ervin Gerardo is c/o of Annual Exam        HPI   Pt presents today for physical. We don't do her breast exams. She mentions she thinks her right implant is \"falling\" and wonders who we can send her to local for that. Still has some pain with sex and tears a little. Will use steroid cream and it gets better. Last mammogram:  Double mastectomy  Last pap smear:  04/2019  Contraception:  hyst  Last bone density:  04/2019  Last colonoscopy: 2017    No LMP recorded (exact date). Patient has had a hysterectomy. No obstetric history on file.     Past Medical History:   Diagnosis Date    Acid reflux     [facility] ([facility] [address]     Dysplasia of cervix     Hyperlipidemia     Hypertension     Thyroid disease      Past Surgical History:   Procedure Laterality Date    BREAST RECONSTRUCTION  2010    BREAST RECONSTRUCTION  2011    BREAST RECONSTRUCTION  2011    CHOLECYSTECTOMY  2006    COLONOSCOPY  12/2011    Pt will see Dr. Mirza Mehta 2017   [address] with retained ovaries    MASTECTOMY, BILATERAL  2008    OTHER SURGICAL HISTORY  2006    Sphincter ODODDI    RI COLONOSCOPY FLX DX W/COLLJ SPEC WHEN PFRMD N/A 4/26/2017    Dr Mart-diverticulosis, 5 yr recall    RI EGD TRANSORAL BIOPSY SINGLE/MULTIPLE N/A 4/11/2018    Dr Mirza Mehta (+) Reza's (-) dysplasia-Zuri (-)--1 yr recall    UPPER GASTROINTESTINAL ENDOSCOPY N/A 5/30/2019    Dr Tutu Beatty-Gastropathy, (+) Reza's, (-) dysplasia--1 yr recall     Family History   Problem Relation Age of Onset    Colon Cancer Neg Hx     Colon Polyps Neg Hx     Liver Cancer Neg Hx     Liver Disease Neg Hx     Esophageal Cancer Neg Hx     Rectal Cancer Neg Hx     Stomach Cancer Neg Hx      Social History     Tobacco Use    Smoking status: Never Smoker    Smokeless tobacco: Never Used   Substance Use Topics    Alcohol use: Yes     Comment: rarely       Current Outpatient Medications   Medication Sig Dispense Refill    escitalopram (LEXAPRO) 10 MG tablet Take 10 mg by mouth daily      polycarbophil (FIBERCON) 625 MG tablet Take 2 tablets by mouth every evening      levothyroxine (SYNTHROID) 50 MCG tablet Take 50 mcg by mouth Daily      omeprazole (PRILOSEC) 20 MG delayed release capsule Take 20 mg by mouth daily      bisoprolol (ZEBETA) 5 MG tablet       ezetimibe (ZETIA) 10 MG tablet Take 10 mg by mouth daily      Cholecalciferol (VITAMIN D3) 2000 UNITS CAPS Take by mouth      aspirin 81 MG tablet Take 81 mg by mouth daily       No current facility-administered medications for this visit. Allergies   Allergen Reactions    Neosporin [Neomycin-Polymyxin-Gramicidin]     Penicillins     Sulfa Antibiotics     Zithromax [Azithromycin]      Vitals:    10/23/20 1341   BP: 130/63   Pulse: 55     Body mass index is 38.62 kg/m². Review of Systems   Constitutional: Negative. HENT: Negative. Eyes: Negative. Respiratory: Negative. Cardiovascular: Negative. Gastrointestinal: Negative. Genitourinary: Positive for dyspareunia. Negative for difficulty urinating, dysuria, enuresis, frequency, hematuria, menstrual problem, pelvic pain, urgency and vaginal discharge. Musculoskeletal: Negative. Skin: Negative. Neurological: Negative. Psychiatric/Behavioral: Negative. Physical Exam  Vitals signs and nursing note reviewed. Constitutional:       General: She is not in acute distress. Appearance: She is well-developed. She is not diaphoretic. HENT:      Head: Normocephalic and atraumatic. Right Ear: External ear normal.      Left Ear: External ear normal.      Nose: Nose normal.   Eyes:      General: Lids are normal.      Conjunctiva/sclera: Conjunctivae normal.      Pupils: Pupils are equal, round, and reactive to light. Neck:      Musculoskeletal: Normal range of motion and neck supple. Thyroid: No thyroid mass or thyromegaly. Trachea: No tracheal deviation. Cardiovascular:      Rate and Rhythm: Normal rate and regular rhythm. Heart sounds: Normal heart sounds. Pulmonary:      Effort: Pulmonary effort is normal. No respiratory distress. Breath sounds: Normal breath sounds. Chest:      Breasts: Breasts are symmetrical.         Right: No inverted nipple, mass, nipple discharge, skin change or tenderness. Left: No inverted nipple, mass, nipple discharge, skin change or tenderness. Abdominal:      General: Bowel sounds are normal.      Palpations: Abdomen is soft. There is no mass. Tenderness: There is no abdominal tenderness. Genitourinary:     Labia:         Right: No rash, tenderness, lesion or injury. Left: No rash, tenderness, lesion or injury. Vagina: Erythema (atrophic) present. No vaginal discharge or tenderness. Adnexa:         Right: No mass, tenderness or fullness. Left: No mass, tenderness or fullness. Comments:  Vaginal cuff intact. Atrophy and dryness noted  Uterus and cervix surgically removed. Musculoskeletal: Normal range of motion. Comments: Normal ROM in all 4 extremities   Skin:     General: Skin is warm and dry. Neurological:      Mental Status: She is alert and oriented to person, place, and time. Sensory: No sensory deficit. Psychiatric:         Speech: Speech normal.         Behavior: Behavior normal.         Thought Content: Thought content normal.         Judgment: Judgment normal.          Diagnosis Orders   1. GYN exam for high-risk Medicare patient     2. Complication of breast implant     3. Dyspareunia in female         MEDICATIONS:  No orders of the defined types were placed in this encounter. ORDERS:  No orders of the defined types were placed in this encounter. PLAN:  Will call Amanuel Martinez and see about ? Dr Vicky Meehan?  For breast implant issue  Continue coconut oil and steroid cream, she doesn't desire anything else at this time  Patient Instructions   Patient Education        Well Visit, Ages 25 to 48: Care Instructions  Your Care Instructions     Physical exams can help you stay healthy. Your doctor has checked your overall health and may have suggested ways to take good care of yourself. He or she also may have recommended tests. At home, you can help prevent illness with healthy eating, regular exercise, and other steps. Follow-up care is a key part of your treatment and safety. Be sure to make and go to all appointments, and call your doctor if you are having problems. It's also a good idea to know your test results and keep a list of the medicines you take. How can you care for yourself at home? · Reach and stay at a healthy weight. This will lower your risk for many problems, such as obesity, diabetes, heart disease, and high blood pressure. · Get at least 30 minutes of physical activity on most days of the week. Walking is a good choice. You also may want to do other activities, such as running, swimming, cycling, or playing tennis or team sports. Discuss any changes in your exercise program with your doctor. · Do not smoke or allow others to smoke around you. If you need help quitting, talk to your doctor about stop-smoking programs and medicines. These can increase your chances of quitting for good. · Talk to your doctor about whether you have any risk factors for sexually transmitted infections (STIs). Having one sex partner (who does not have STIs and does not have sex with anyone else) is a good way to avoid these infections. · Use birth control if you do not want to have children at this time. Talk with your doctor about the choices available and what might be best for you. · Protect your skin from too much sun. When you're outdoors from 10 a.m. to 4 p.m., stay in the shade or cover up with clothing and a hat with a wide brim.  Wear sunglasses that block UV rays. Even when it's cloudy, put broad-spectrum sunscreen (SPF 30 or higher) on any exposed skin. · See a dentist one or two times a year for checkups and to have your teeth cleaned. · Wear a seat belt in the car. Follow your doctor's advice about when to have certain tests. These tests can spot problems early. For everyone  · Cholesterol. Have the fat (cholesterol) in your blood tested after age 21. Your doctor will tell you how often to have this done based on your age, family history, or other things that can increase your risk for heart disease. · Blood pressure. Have your blood pressure checked during a routine doctor visit. Your doctor will tell you how often to check your blood pressure based on your age, your blood pressure results, and other factors. · Vision. Talk with your doctor about how often to have a glaucoma test.  · Diabetes. Ask your doctor whether you should have tests for diabetes. · Colon cancer. Your risk for colorectal cancer gets higher as you get older. Some experts say that adults should start regular screening at age 48 and stop at age 76. Others say to start before age 48 or continue after age 76. Talk with your doctor about your risk and when to start and stop screening. For women  · Breast exam and mammogram. Talk to your doctor about when you should have a clinical breast exam and a mammogram. Medical experts differ on whether and how often women under 50 should have these tests. Your doctor can help you decide what is right for you. · Cervical cancer screening test and pelvic exam. Begin with a Pap test at age 24. The test often is part of a pelvic exam. Starting at age 27, you may choose to have a Pap test, an HPV test, or both tests at the same time (called co-testing). Talk with your doctor about how often to have testing. · Tests for sexually transmitted infections (STIs). Ask whether you should have tests for STIs.  You may be at risk if you have sex with more than one person, especially if your partners do not wear condoms. For men  · Tests for sexually transmitted infections (STIs). Ask whether you should have tests for STIs. You may be at risk if you have sex with more than one person, especially if you do not wear a condom. · Testicular cancer exam. Ask your doctor whether you should check your testicles regularly. · Prostate exam. Talk to your doctor about whether you should have a blood test (called a PSA test) for prostate cancer. Experts differ on whether and when men should have this test. Some experts suggest it if you are older than 39 and are -American or have a father or brother who got prostate cancer when he was younger than 72. When should you call for help? Watch closely for changes in your health, and be sure to contact your doctor if you have any problems or symptoms that concern you. Where can you learn more? Go to [email or web address] and sign in to your [employer] account. Enter P072 in the [employer] box to learn more about \"Well Visit, Ages 25 to 48: Care Instructions. \"     If you do not have an account, please click on the \"Sign Up Now\" link. Current as of: May 27, 2020               Content Version: 12.6  © 9487-5662 [employer]. Care instructions adapted under license by [facility] ([facility]). If you have questions about a medical condition or this instruction, always ask your healthcare professional. [place] [address] any warranty or liability for your use of this information.

## 2020-11-10 NOTE — PROGRESS NOTES
HISTORY OF PRESENT ILLNESS:    Ms. Steven Solis is a 63 y.o. white black  female who was referred by Farida Kimble with concerns about her implants. She has a personal history of breast cancer and underwent bilateral Mastectomies in 2008. She had her implants placed in 2010. All was performed in [address] [place] [address]. She apparently received neoadjuvant chemotherapy for a HER-2 positive cancer. She had a pathologic complete response and then had bilateral mastectomies with a right implant reconstruction with a memory gel implant and a pedicle TRAM on the left. She complains about some wrinkling in the subpectoral implant on the right and some animation deformity. She also has marked lymphedema on the left which still responds very well to compression and wrapping but she is very interested in therapeutic options. BREAST EXAM:    We did do photographs to start with. She has a pedicle TRAM on the left with no palpable masses but marked left-sided lymphedema. On the right she has a subpectoral memory gel implant with some animation deformity and some wrinkling. Neither shows any evidence of new or recurrent tumor. IMPRESSION: Lymphedema left arm that hopefully is amenable to surgical intervention    The right is less urgent but should respond to revision with moving the implant in front of the muscle and possibly some fat grafting. PLAN: I would recommend seeing Dr. Karla Gee for Lymphedema therapy. And possible implant revision    She is extraordinarily happy about all of this    DISCUSSION:  We discussed lymphedema options and surgical options at great length and she is very interested in pursuing this. We will get her set up with Zee Rice in [place] and we will be happy to help with any postoperative needs. I have seen, examined and reviewed this patient medication list, appropriate labs and imaging studies. I reviewed relevant medical records and others physicians notes.  I discussed the

## 2020-11-11 ENCOUNTER — OFFICE VISIT (OUTPATIENT)
Dept: SURGERY | Age: 65
End: 2020-11-11
Payer: MEDICARE

## 2020-11-11 ENCOUNTER — PATIENT MESSAGE (OUTPATIENT)
Dept: SURGERY | Age: 65
End: 2020-11-11

## 2020-11-11 VITALS — HEART RATE: 80 BPM | SYSTOLIC BLOOD PRESSURE: 112 MMHG | DIASTOLIC BLOOD PRESSURE: 70 MMHG

## 2020-11-11 PROCEDURE — 1036F TOBACCO NON-USER: CPT | Performed by: SURGERY

## 2020-11-11 PROCEDURE — G8417 CALC BMI ABV UP PARAM F/U: HCPCS | Performed by: SURGERY

## 2020-11-11 PROCEDURE — G8484 FLU IMMUNIZE NO ADMIN: HCPCS | Performed by: SURGERY

## 2020-11-11 PROCEDURE — 3017F COLORECTAL CA SCREEN DOC REV: CPT | Performed by: SURGERY

## 2020-11-11 PROCEDURE — 99205 OFFICE O/P NEW HI 60 MIN: CPT | Performed by: SURGERY

## 2020-11-11 PROCEDURE — G8427 DOCREV CUR MEDS BY ELIG CLIN: HCPCS | Performed by: SURGERY

## 2020-11-11 NOTE — Clinical Note
Appointment Dr. Miles Garcias in [place]. I talked with him today.  I will see her in 3 months for whatever

## 2020-11-12 PROBLEM — Z90.13 S/P BILATERAL MASTECTOMY: Status: ACTIVE | Noted: 2020-11-12

## 2020-11-12 PROBLEM — Z85.3 PERSONAL HISTORY OF BREAST CANCER: Status: ACTIVE | Noted: 2020-11-12

## 2020-11-12 NOTE — TELEPHONE ENCOUNTER
Vazquez Night,  My name is Jose A Soler, Dr. Clementina Nelson assistant who roomed you yesterday. I will make sure he gets your message.

## 2020-11-12 NOTE — TELEPHONE ENCOUNTER
From: Rosa Gonzalez  To: Kodak May MD  Sent: 11/11/2020 6:35 PM CST  Subject: Visit Follow-Up Question    Not a question, but a [address] for your information today. I was so excited to hear there is something new concerning my lymphedema. What a blessing that will be. I wanted to hug you, but in Covid times I knew I couldn't. Thank you!!  Thank you !!!!

## 2021-01-04 ENCOUNTER — TELEPHONE (OUTPATIENT)
Dept: SURGERY | Age: 66
End: 2021-01-04

## 2021-01-04 NOTE — TELEPHONE ENCOUNTER
Pt called stating she sees Dr. Triny Parsons yearly for checkups and is wanting to know if she can get those switched with Dr Scotty Allen so she doesn't have to drive as far.

## 2021-01-05 DIAGNOSIS — Z85.3 PERSONAL HISTORY OF BREAST CANCER: Primary | ICD-10-CM

## 2021-01-05 NOTE — TELEPHONE ENCOUNTER
Patient called back. I had spoke with Terrell Kussmaul and she states we can refer her to Dr. Melissa Benitez to get established with him. Referral has been placed. I let the patient know Dr. Varinder Duncan office will call her with an appt.

## 2021-01-19 ENCOUNTER — TELEPHONE (OUTPATIENT)
Dept: SURGERY | Age: 66
End: 2021-01-19

## 2021-01-19 NOTE — TELEPHONE ENCOUNTER
Patient left voicemail that she was reffered to have lymphedema surgery but was told that she needed to start lymphedema therapy first, and she needed an order for that. Please contact the patient and advise when and were the order was sent.   Thank you   [phone]  CC Sandra Alves

## 2021-01-20 DIAGNOSIS — Z85.3 PERSONAL HISTORY OF BREAST CANCER: Primary | ICD-10-CM

## 2021-01-20 DIAGNOSIS — Z90.13 S/P BILATERAL MASTECTOMY: ICD-10-CM

## 2021-01-20 NOTE — TELEPHONE ENCOUNTER
Patient is having Surgery on 2/23/2021 and is needing a referral to see our Lymphedema specialist to evaluate prior to surgery. I will put referral in and get her set up.     I also called  office and requested his last note

## 2021-02-04 ENCOUNTER — TRANSCRIBE ORDERS (OUTPATIENT)
Dept: ADMINISTRATIVE | Facility: HOSPITAL | Age: 66
End: 2021-02-04

## 2021-02-04 DIAGNOSIS — Z01.818 PREOP TESTING: Primary | ICD-10-CM

## 2021-02-04 DIAGNOSIS — Z90.13 ACQUIRED ABSENCE OF BOTH BREASTS: ICD-10-CM

## 2021-02-08 ENCOUNTER — HOSPITAL ENCOUNTER (OUTPATIENT)
Dept: PHYSICAL THERAPY | Age: 66
Setting detail: THERAPIES SERIES
Discharge: HOME | End: 2021-02-08
Payer: MEDICARE

## 2021-02-08 PROCEDURE — 97750 PHYSICAL PERFORMANCE TEST: CPT

## 2021-02-08 PROCEDURE — 97110 THERAPEUTIC EXERCISES: CPT

## 2021-02-08 PROCEDURE — 97161 PT EVAL LOW COMPLEX 20 MIN: CPT

## 2021-02-08 PROCEDURE — 97162 PT EVAL MOD COMPLEX 30 MIN: CPT

## 2021-02-08 PROCEDURE — 97530 THERAPEUTIC ACTIVITIES: CPT

## 2021-02-08 NOTE — PROGRESS NOTES
Physical Therapy  Initial Assessment  Date: 2021  Patient Name: Rudy Mcnally  MRN: [id number]  : 1955     Treatment Diagnosis: left UE swelling    Restrictions  Position Activity Restriction  Other position/activity restrictions: NO SHORT NECK TREATMENT    Subjective   General  Chart Reviewed: Yes  Patient assessed for rehabilitation services?: Yes  Additional Pertinent Hx: History of present Illness: Left Upper Extremity Lymphedema, Stage 2 - Patient was diagnosed with breast cancer 2008 and had a double masectomy in  with a breast reconstruction in . 6 months later she started noticing swelling. She started therapy in  in [facility]. She recived Manual Lymph drainage and short stretch bandage. In  she moved to [facility]  received treatment at [facility] . She now is scheduled to have surgery on  to connect lymph vessels back to vein sysyem. Patient reports she is knowledgeable about lymphedema but she does not do what she is suppose to, to maintain a smaller limb size. Patient has a compression garment but she has had it for years and she is unable to wear it because of the increased swelling. She does have notable swelling in her arm and hand.   Family / Caregiver Present: No  Referring Practitioner: Ana Paula Solorzano  Referral Date : 21  Diagnosis: Personal history of breast cancer; s/p bilateral masectomy  Follows Commands: Within Functional Limits  PT Visit Information  PT Insurance Information: Medicare/ BCBS  Total # of Visits Approved: 15  Total # of Visits to Date: 1  Progress Note Due Date: 03/10/21  Subjective  Subjective: Patient reports she only has heaviness in her left arm  Pain Screening  Patient Currently in Pain: No  Pain Assessment  Pain Assessment: 0-10  Vital Signs  Patient Currently in Pain: No    Vision/Hearing  Vision  Vision: Within Functional Limits  Hearing  Hearing: Within functional limits    Orientation  Orientation  Overall Orientation Status: Within does not know what her surgeon will have her do following surgery. More information will be given towards the end of the week or the beginning of next week after she meets with him. She was also educated on the physiology and anatomy of the lymphatic system, and educated on the POC and what all it will entail. Treatment would include therapeutic exercise, compression garment assessment and wrapping using short stretch bandages, manual lymph drainage, patient and family education and a additional recommendations for an effective home lymphedema program.  Treatment Diagnosis: left UE swelling  Prognosis: Good  Decision Making: Medium Complexity  REQUIRES PT FOLLOW UP: Yes(3 month followup)  Treatment Initiated : education. measuring  Discharge Recommendations: 3-5 sessions per week         Plan   Plan  Times per week: 4-5  Plan weeks: 2-8  Current Treatment Recommendations: Strengthening, Manual Lymphatic Drainage, ROM, Home Exercise Program, IADL Training    G-Code       OutComes Score                                                  AM-PAC Score             Goals  Short term goals  Time Frame for Short term goals: 1-2 weeks  Short term goal 1: Patient will be indep with HEP  Short term goal 2: Patient will be indep with self MLD  Short term goal 3: Patient will decrease 2-5 cm in each placements of the left UE  Long term goals  Time Frame for Long term goals : 4-8 weeks  Long term goal 1: Patient to be indep to don and doff compression garments properly without undue skin friction to reduce risk for skin breakdown, and be Indep with wearing schedule.   Long term goal 2: Patient Lymphedema Life Impact Scale Impairment score will decrease by 20%  Long term goal 3: Patient L-Dex score will decrease by 10 points  Patient Goals   Patient goals : I want to decrease arm size to prepare for surgery       Therapy Time   Individual Concurrent Group Co-treatment   Time In 1300         Time Out 1400         Minutes 60 Timed Code Treatment Minutes: [address], PT    Electronically signed by Tod Workman PT on 2/8/2021 at 4:42 PM

## 2021-02-09 ENCOUNTER — HOSPITAL ENCOUNTER (OUTPATIENT)
Dept: PHYSICAL THERAPY | Age: 66
Setting detail: THERAPIES SERIES
Discharge: HOME | End: 2021-02-09
Payer: MEDICARE

## 2021-02-09 PROCEDURE — 97140 MANUAL THERAPY 1/> REGIONS: CPT

## 2021-02-09 PROCEDURE — 97530 THERAPEUTIC ACTIVITIES: CPT

## 2021-02-09 NOTE — PROGRESS NOTES
Physical Therapy  Daily Treatment Note  Date: 2021  Patient Name: Basilio Malone  MRN: [id number]     :   1955    Subjective:   General  Chart Reviewed: Yes  Additional Pertinent Hx: History of present Illness: Left Upper Extremity Lymphedema, Stage 2 - Patient was diagnosed with breast cancer 2008 and had a double masectomy in  with a breast reconstruction in . 6 months later she started noticing swelling. She started therapy in  in [facility]. She recived Manual Lymph drainage and short stretch bandage. In  she moved to [facility]  received treatment at [facility] . She now is scheduled to have surgery on  to connect lymph vessels back to vein sysyem. Patient reports she is knowledgeable about lymphedema but she does not do what she is suppose to, to maintain a smaller limb size. Patient has a compression garment but she has had it for years and she is unable to wear it because of the increased swelling. She does have notable swelling in her arm and hand. Family / Caregiver Present: No  Referring Practitioner: Milvia Steven  PT Visit Information  PT Insurance Information: Medicare/ [employer]  Total # of Visits Approved: 15  Total # of Visits to Date: 2  Progress Note Due Date: 03/10/21  Subjective  Subjective: Patient reports no pain this morning  Pain Screening  Patient Currently in Pain: No  Vital Signs  Patient Currently in Pain: No       Treatment Activities:                                      Exercises  Exercise 1: MLD : opened Anastomosis for PIERCE, AAA, IA and full arm treatment  Exercise 2: Self MLD  Exercise 3: Compression Wrapping: used short stretch bandages to wrap entire left arm  Exercise 4: Skin Care  Exercise 5: Exercises: HEP  Exercise 6: Measurements                                   Assessment:   Conditions Requiring Skilled Therapeutic Intervention  Assessment:  tolerated tretament well.  PTperformed manual lymph drainage, opening the anastomosis for the anterior axillary axilary, posterior axillary axillary, and left axillary inguinal and full arm treatment. Following MLD PT wrapped patient fingers and arm using cotton padding, finger wraps, and 6cm,8cm,10cm(2) short stretch bandages. Patient was able to move her arm, fingers and wrist freely with no difficulty. Next treatment session PT will educated patient on self MLD and give her a handout to perform at home. Treatment Diagnosis: left UE swelling  Prognosis: Good  Decision Making: Medium Complexity  REQUIRES PT FOLLOW UP: Yes(3 month followup)  Treatment Initiated : education. measuring  Discharge Recommendations: 3-5 sessions per week      G-Code:     OutComes Score                                                     Goals:  Short term goals  Time Frame for Short term goals: 1-2 weeks  Short term goal 1: Patient will be indep with HEP  Short term goal 2: Patient will be indep with self MLD  Short term goal 3: Patient will decrease 2-5 cm in each placements of the left UE  Long term goals  Time Frame for Long term goals : 4-8 weeks  Long term goal 1: Patient to be indep to don and doff compression garments properly without undue skin friction to reduce risk for skin breakdown, and be Indep with wearing schedule.   Long term goal 2: Patient Lymphedema Life Impact Scale Impairment score will decrease by 20%  Long term goal 3: Patient L-Dex score will decrease by 10 points  Patient Goals   Patient goals : I want to decrease arm size to prepare for surgery    Plan:       Timed Code Treatment Minutes: 55 Minutes     Therapy Time   Individual Concurrent Group Co-treatment   Time In 0700         Time Out 2792         Minutes 55         Timed Code Treatment Minutes: [address], NIK     Electronically signed by Paulino Funes PT on 2/9/2021 at 8:10 AM

## 2021-02-11 ENCOUNTER — APPOINTMENT (OUTPATIENT)
Dept: PHYSICAL THERAPY | Age: 66
End: 2021-02-11
Payer: MEDICARE

## 2021-02-12 ENCOUNTER — HOSPITAL ENCOUNTER (OUTPATIENT)
Dept: PHYSICAL THERAPY | Age: 66
Setting detail: THERAPIES SERIES
End: 2021-02-12
Payer: MEDICARE

## 2021-02-12 NOTE — PROGRESS NOTES
MEDICAL ONCOLOGY CONSULTATION    Pt Name: Abraham Maki  MRN: [id number]  YOB: 1955  Date of evaluation: 4/2/2021    REASON FOR CONSULTATION: Personal History of Breast Cancer  REQUESTING PHYSICIAN: Dr Michelle Mina    History Obtained From:  patient, electronic medical record    HISTORY OF PRESENT ILLNESS:      Diagnosis  · Stage II high-grade invasive ductal carcinoma of left breast, February 2008  · ER negative/OR negative, HER-2 positive  · Chronic lymphedema    Treatment summary  · 2008neoadjuvant chemotherapy with Carboplatin, Taxotere and Herceptin  · 07/23/2008bilateral mastectomy  · 2008adjuvant breast XRT  · 2010- implants and reconstruction  · 03/2021- Lymphedema Surgery (lymphovenous anastomosis)    Ford Lee was first seen by me on 4/2/2021. Patient has a personal history of stage II HER-2 positive/ER negative left breast invasive ductal carcinoma. She underwent neoadjuvant chemotherapy with Taxotere, carboplatin and Herceptin. She underwent a bilateral mastectomy 2008. Apparently had a complete pathologic response. 17 lymph nodes removed. She had implants placed in 2010. She has been struggling with chronic lymphedema. · 2/13/2008- Us-Guided Left Breast and Left Axillary Lymph Node Biopsy Invasive ductal carcinoma, high-grade. Lymph Node, Left Axilla, Biopsy: Ductal carcinoma, metastatic. · 3/6/2008- Cancer Markers CA 15-3 = 12, CA 27.29 = 16  · 3/6/2008- Pet Ct Skull Base to Mid Thigh Abnormal PET CT imaging study documenting left axillary lymphadenopathy as was well and is 2 lesions in the upper-outer quadrant of the left breast  · 2008neoadjuvant chemotherapy with Carboplatin, Taxotere and Herceptin  · 7/8/2008- Mri Breast Bilateral No definitive evidence of malignancy   · 7/23/2008- Bilateral Mastectomy and Axillary Lymphadenectomy No residual invasive ductal carcinoma identified. 17 lymph nodes removed.    · 2008- adjuvant radiation therapy  · 2010- Breast Implants and ezetimibe (ZETIA) 10 MG tablet Take 10 mg by mouth daily      Cholecalciferol (VITAMIN D3) 2000 UNITS CAPS Take by mouth      aspirin 81 MG tablet Take 81 mg by mouth daily       No current facility-administered medications for this visit. Allergies: Allergies   Allergen Reactions    Neosporin [Neomycin-Polymyxin-Gramicidin]     Penicillins     Sulfa Antibiotics     Zithromax [Azithromycin]          Subjective   REVIEW OF SYSTEMS:   CONSTITUTIONAL: no fever, no night sweats, no fatigue;  HEENT: no blurring of vision, no double vision, no hearing difficulty, no tinnitus, no ulceration, no dysplasia, no epistaxis;  LUNGS: no cough, no hemoptysis, no wheeze,  no shortness of breath;  CARDIOVASCULAR: no palpitation, no chest pain, no shortness of breath;  GI: no abdominal pain, no nausea, no vomiting, no diarrhea, no constipation;  JORJE: no dysuria, no hematuria, no frequency or urgency, no nephrolithiasis;  MUSCULOSKELETAL: no joint pain, no swelling, no stiffness; chronic lymphedema of left arm  ENDOCRINE: no polyuria, no polydipsia, no cold or heat intolerance; vaginal dryness  HEMATOLOGY: no easy bruising or bleeding, no history of clotting disorder;  DERMATOLOGY: no skin rash, no eczema, no pruritus;  PSYCHIATRY: no depression, anxiety, no panic attacks, no suicidal ideation, no homicidal ideation;  NEUROLOGY: no syncope, no seizures, no numbness or tingling of hands, no numbness or tingling of feet, no paresis;    Objective   /76   Pulse 69   Temp 96.9 °F (36.1 °C)   Ht 5' 4\" (1.626 m)   Wt 259 lb (117.5 kg)   SpO2 93%   BMI 44.46 kg/m²     PHYSICAL EXAM:  CONSTITUTIONAL: Alert, appropriate, no acute distress  EYES: Non icteric, EOM intact, pupils equal round   ENT: Mucus membranes moist, no oral pharyngeal lesions, external inspection of ears and nose are normal  NECK: Supple, no masses.   No palpable thyroid mass  CHEST/LUNGS: CTA bilaterally, normal respiratory effort   CARDIOVASCULAR: RRR, no murmurs. No lower extremity edema  ABDOMEN: soft non-tender, active bowel sounds, no HSM. No palpable masses  EXTREMITIES: warm, full ROM in all 4 extremities, no focal weakness. SKIN: warm, dry with no rashes or lesions  LYMPH: No cervical, clavicular, axillary, or inguinal lymphadenopathy  NEUROLOGIC: follows commands, non focal   PSYCH: mood and affect appropriate. Alert and oriented to time, place, person        LABORATORY RESULTS REVIEWED/ANALYZED BY ME:  Lab Results   Component Value Date    WBC 12.82 (H) 04/02/2021    HGB 13.8 04/02/2021    HCT 43.4 04/02/2021    MCV 88.8 04/02/2021     04/02/2021     Lab Results   Component Value Date    NEUTROABS 9.48 (H) 04/02/2021         RADIOLOGY STUDIES REPORT/REVIEWED AND INTERPRETED BY ME:  No results found. My interpretation:      ASSESSMENT:  #History of Breast Cancer Her positive  Patient has a history of ER/DE negative, HER2 positive IDC of left breast. She received neoadjuvant taxotere/carbo/herceptin. Bilateral mastectomy showed complete response. She had adjuvant radiation therapy. She has had no evidence of disease recurrence. Breast exam with Dr. Yuval Ramirez was unremarkable and patient has no symptoms or concerns. Discussed recommendations for surveillance only at this time. #Chronic Lymphedema  Patient was referred to Dr. Hu Oseguera at [facility] by Dr. Yuval Ramirez. She is status/post lymphovenous bypasses to the left upper extremity. She reports 75% improvement in baseline symptoms. #Healthcare Maintenance  Last Mammogram: 2008 at [facility]  Last Pap Smear: 2019 at [facility] Colonoscopy: 2017 at [facility] Endoscopy: 2019 at [address] [place] [address]:  · RTC February 2022  · Continue lymphedema treatment    I have seen, examined and reviewed this patient medication list, appropriate labs and imaging studies. I reviewed relevant medical records and others physicians notes.  I discussed the plans of care with the patient. I answered all the questions to the patients satisfaction. I have also reviewed the chief complaint (CC) and part of the history (History of Present Illness (HPI), Past Family Social History [facility]), or Review of Systems (ROS) and made changes when appropriated. (Please note that portions of this note were completed with a voice recognition program. Efforts were made to edit the dictations but occasionally words are mis-transcribed.)    The total time (45min) I spent to see the patient today includes at least one or more of the following: preparing to see the patient by reviewing prior tests, prior notes or other relevant information, performing appropriate independent examination and evaluation, counseling, documenting clinic information in the electronic medical record or other health records, independently interpreting results of tests, managing test results and communicating the results to the patient/family or caregiver.     Norma Henriquez MD    04/02/21  3:51 PM

## 2021-02-15 ENCOUNTER — APPOINTMENT (OUTPATIENT)
Dept: PHYSICAL THERAPY | Age: 66
End: 2021-02-15
Payer: MEDICARE

## 2021-02-16 ENCOUNTER — APPOINTMENT (OUTPATIENT)
Dept: PHYSICAL THERAPY | Age: 66
End: 2021-02-16
Payer: MEDICARE

## 2021-02-17 ENCOUNTER — APPOINTMENT (OUTPATIENT)
Dept: PHYSICAL THERAPY | Age: 66
End: 2021-02-17
Payer: MEDICARE

## 2021-02-18 ENCOUNTER — APPOINTMENT (OUTPATIENT)
Dept: PHYSICAL THERAPY | Age: 66
End: 2021-02-18
Payer: MEDICARE

## 2021-02-19 ENCOUNTER — LAB (OUTPATIENT)
Dept: LAB | Facility: HOSPITAL | Age: 66
End: 2021-02-19

## 2021-02-19 DIAGNOSIS — Z01.818 PREOP TESTING: Primary | ICD-10-CM

## 2021-02-19 LAB — SARS-COV-2 RNA PNL SPEC NAA+PROBE: NOT DETECTED

## 2021-02-19 PROCEDURE — 87635 SARS-COV-2 COVID-19 AMP PRB: CPT

## 2021-02-19 PROCEDURE — C9803 HOPD COVID-19 SPEC COLLECT: HCPCS

## 2021-02-25 ENCOUNTER — TELEPHONE (OUTPATIENT)
Dept: SURGERY | Age: 66
End: 2021-02-25

## 2021-02-25 NOTE — PROGRESS NOTES
manual lymph drainage, opening the anastomosis for the anterior axillary axilary, posterior axillary axillary, and left axillary inguinal and full arm treatment. Following MLD PT wrapped patient fingers and arm using cotton padding, finger wraps, and 6cm,8cm,10cm(2) short stretch bandages. Patient was able to move her arm, fingers and wrist freely with no difficulty. Next treatment session PT will educated patient on self MLD and give her a handout to perform at home. Plan  Discharge          Goals (no goals met patient only seen 1x)  Short term goals  Time Frame for Short term goals: 1-2 weeks  Short term goal 1: Patient will be indep with HEP  Short term goal 2: Patient will be indep with self MLD  Short term goal 3: Patient will decrease 2-5 cm in each placements of the left UE    Long term goals  Time Frame for Long term goals : 4-8 weeks  Long term goal 1: Patient to be indep to don and doff compression garments properly without undue skin friction to reduce risk for skin breakdown, and be Indep with wearing schedule.   Long term goal 2: Patient Lymphedema Life Impact Scale Impairment score will decrease by 20%  Long term goal 3: Patient L-Dex score will decrease by 10 points         Electronically signed by Marilin Solano PT on 2/25/2021 at 8:31 AM

## 2021-03-12 NOTE — PROGRESS NOTES
HISTORY OF PRESENT ILLNESS:    Ms. Meg Marin is a 63 y.o. presents today for a 3 month breast exam.    She was referred by Miguel Velásquez with concerns about her implants. She has a personal history of breast cancer and underwent bilateral Mastectomies in 2008. She had her implants placed in 2010. All was performed in [address] [place] [address]. She apparently received neoadjuvant chemotherapy for a HER-2 positive cancer. She had a pathologic complete response and then had bilateral mastectomies with a right implant reconstruction with a memory gel implant and a pedicle TRAM on the left. She complains about some wrinkling in the subpectoral implant on the right and some animation deformity. She also has marked lymphedema on the left which still responds very well to compression and wrapping but she is very interested in therapeutic options. She has now had lymphovenous bypasses in her left arm with dramatic symptomatic relief in February 2021. She also had revision of her right subpectoral implant with placement in a prepectoral position and it feels much better and looks much better. This was all [place] BREAST EXAM:    We did do photographs to start with. She has a pedicle TRAM on the left with no palpable masses but marked left-sided lymphedema. On the right she has recent surgery with dramatic cosmetic improvement. .    Neither shows any evidence of new or recurrent tumor. IMPRESSION: Doing extremely well after her recent surgeries. No evidence of recurrent disease    PLAN: Follow-up with me in 1 year. She does have fading of her nipple reconstruction and would be amenable to nipple tattooing by Dom Angela. I have seen, examined and reviewed this patient medication list, appropriate labs and imaging studies. I reviewed relevant medical records and others physicians notes. I discussed the plans of care with the patient.  I answered all the questions to the patients satisfaction. I, Dr Kris Duverney, personally performed the services described in this documentation as scribed by Jennifer Amador MA in my presence and is both accurate and complete. (Please note that portions of this note were completed with a voice recognition program. Efforts were made to edit the dictations but occasionally words are mis-transcribed.)  Over 50% of the total visit time of 15 minutes in face to face encounter with the patient, out of which more than 50% of the time was spent in counseling patient or family and coordination of care. Counseling included but was not limited to time spent reviewing labs, imaging studies/ treatment plan and answering questions.

## 2021-03-15 ENCOUNTER — OFFICE VISIT (OUTPATIENT)
Dept: SURGERY | Age: 66
End: 2021-03-15
Payer: MEDICARE

## 2021-03-15 VITALS — SYSTOLIC BLOOD PRESSURE: 110 MMHG | DIASTOLIC BLOOD PRESSURE: 60 MMHG | HEART RATE: 80 BPM

## 2021-03-15 DIAGNOSIS — Z90.13 S/P BILATERAL MASTECTOMY: Primary | ICD-10-CM

## 2021-03-15 PROCEDURE — G8484 FLU IMMUNIZE NO ADMIN: HCPCS | Performed by: SURGERY

## 2021-03-15 PROCEDURE — G8417 CALC BMI ABV UP PARAM F/U: HCPCS | Performed by: SURGERY

## 2021-03-15 PROCEDURE — 1036F TOBACCO NON-USER: CPT | Performed by: SURGERY

## 2021-03-15 PROCEDURE — 3017F COLORECTAL CA SCREEN DOC REV: CPT | Performed by: SURGERY

## 2021-03-15 PROCEDURE — G8427 DOCREV CUR MEDS BY ELIG CLIN: HCPCS | Performed by: SURGERY

## 2021-03-15 PROCEDURE — 99213 OFFICE O/P EST LOW 20 MIN: CPT | Performed by: SURGERY

## 2021-03-15 NOTE — Clinical Note
She needs bilateral nipple tattooing at her convenience with Adonis Dupree Physical exam 1 year with me

## 2021-03-24 DIAGNOSIS — Z85.3 PERSONAL HISTORY OF BREAST CANCER: Primary | ICD-10-CM

## 2021-03-27 PROCEDURE — 87086 URINE CULTURE/COLONY COUNT: CPT | Performed by: FAMILY MEDICINE

## 2021-03-27 PROCEDURE — 87186 SC STD MICRODIL/AGAR DIL: CPT | Performed by: FAMILY MEDICINE

## 2021-03-27 PROCEDURE — 87088 URINE BACTERIA CULTURE: CPT | Performed by: FAMILY MEDICINE

## 2021-04-02 ENCOUNTER — OFFICE VISIT (OUTPATIENT)
Dept: HEMATOLOGY | Age: 66
End: 2021-04-02
Payer: MEDICARE

## 2021-04-02 ENCOUNTER — HOSPITAL ENCOUNTER (OUTPATIENT)
Dept: INFUSION THERAPY | Age: 66
Discharge: HOME | End: 2021-04-02
Payer: MEDICARE

## 2021-04-02 VITALS
SYSTOLIC BLOOD PRESSURE: 122 MMHG | BODY MASS INDEX: 44.22 KG/M2 | TEMPERATURE: 96.9 F | WEIGHT: 259 LBS | HEART RATE: 69 BPM | HEIGHT: 64 IN | OXYGEN SATURATION: 93 % | DIASTOLIC BLOOD PRESSURE: 76 MMHG

## 2021-04-02 DIAGNOSIS — Z85.3 PERSONAL HISTORY OF BREAST CANCER: Primary | ICD-10-CM

## 2021-04-02 DIAGNOSIS — Z00.00 HEALTHCARE MAINTENANCE: ICD-10-CM

## 2021-04-02 DIAGNOSIS — I89.0 LYMPHEDEMA: ICD-10-CM

## 2021-04-02 DIAGNOSIS — Z85.3 PERSONAL HISTORY OF BREAST CANCER: ICD-10-CM

## 2021-04-02 LAB
BASOPHILS ABSOLUTE: 0.02 K/UL (ref 0.01–0.08)
BASOPHILS RELATIVE PERCENT: 0.2 % (ref 0.1–1.2)
EOSINOPHILS ABSOLUTE: 0.57 K/UL (ref 0.04–0.54)
EOSINOPHILS RELATIVE PERCENT: 4.4 % (ref 0.7–7)
HCT VFR BLD CALC: 43.4 % (ref 34.1–44.9)
HEMOGLOBIN: 13.8 G/DL (ref 11.2–15.7)
LYMPHOCYTES ABSOLUTE: 1.66 K/UL (ref 1.18–3.74)
LYMPHOCYTES RELATIVE PERCENT: 12.9 % (ref 19.3–53.1)
MCH RBC QN AUTO: 28.2 PG (ref 25.6–32.2)
MCHC RBC AUTO-ENTMCNC: 31.8 G/DL (ref 32.3–35.5)
MCV RBC AUTO: 88.8 FL (ref 79.4–94.8)
MONOCYTES ABSOLUTE: 1.09 K/UL (ref 0.24–0.82)
MONOCYTES RELATIVE PERCENT: 8.5 % (ref 4.7–12.5)
NEUTROPHILS ABSOLUTE: 9.48 K/UL (ref 1.56–6.13)
NEUTROPHILS RELATIVE PERCENT: 74 % (ref 34–71.1)
PDW BLD-RTO: 14.8 % (ref 11.7–14.4)
PLATELET # BLD: 271 K/UL (ref 182–369)
PMV BLD AUTO: 9.9 FL (ref 7.4–10.4)
RBC # BLD: 4.89 M/UL (ref 3.93–5.22)
WBC # BLD: 12.82 K/UL (ref 3.98–10.04)

## 2021-04-02 PROCEDURE — 3017F COLORECTAL CA SCREEN DOC REV: CPT | Performed by: INTERNAL MEDICINE

## 2021-04-02 PROCEDURE — G8417 CALC BMI ABV UP PARAM F/U: HCPCS | Performed by: INTERNAL MEDICINE

## 2021-04-02 PROCEDURE — 85025 COMPLETE CBC W/AUTO DIFF WBC: CPT

## 2021-04-02 PROCEDURE — 36415 COLL VENOUS BLD VENIPUNCTURE: CPT

## 2021-04-02 PROCEDURE — 99203 OFFICE O/P NEW LOW 30 MIN: CPT | Performed by: INTERNAL MEDICINE

## 2021-04-02 PROCEDURE — G8428 CUR MEDS NOT DOCUMENT: HCPCS | Performed by: INTERNAL MEDICINE

## 2021-04-02 PROCEDURE — 99211 OFF/OP EST MAY X REQ PHY/QHP: CPT

## 2021-04-02 PROCEDURE — 1036F TOBACCO NON-USER: CPT | Performed by: INTERNAL MEDICINE

## 2021-04-02 RX ORDER — EZETIMIBE 10 MG/1
10 TABLET ORAL DAILY
COMMUNITY
End: 2021-04-02 | Stop reason: CLARIF

## 2021-04-06 ENCOUNTER — HOSPITAL ENCOUNTER (OUTPATIENT)
Dept: PHYSICAL THERAPY | Age: 66
Setting detail: THERAPIES SERIES
Discharge: HOME | End: 2021-04-06
Payer: MEDICARE

## 2021-04-06 PROCEDURE — 97750 PHYSICAL PERFORMANCE TEST: CPT

## 2021-04-06 PROCEDURE — 97530 THERAPEUTIC ACTIVITIES: CPT

## 2021-04-06 PROCEDURE — 97162 PT EVAL MOD COMPLEX 30 MIN: CPT

## 2021-04-06 NOTE — PROGRESS NOTES
followup)  Treatment Initiated : education. measuring  Discharge Recommendations: 3-5 sessions per week         Plan        Goals  Short term goals  Time Frame for Short term goals: 1-2 weeks  Short term goal 1: Patient will be indep with HEP  Short term goal 2: Patient will be indep with self MLD  Short term goal 3: Patient will decrease 2-5 cm in each placements of the left UE  Long term goals  Time Frame for Long term goals : 4-8 weeks  Long term goal 1: Patient to be indep to don and doff compression garments properly without undue skin friction to reduce risk for skin breakdown, and be Indep with wearing schedule. Long term goal 2: Patient Lymphedema Life Impact Scale Impairment score will decrease by 20%  Long term goal 3: Patient L-Dex score will decrease by 10 points  Long term goal 4: PT will recommend proper maintenance garment for daytime wear.   Patient Goals   Patient goals : I want to decrease arm size to prepare for surgery       Therapy Time   Individual Concurrent Group Co-treatment   Time In 1230         Time Out 1330         Minutes 60         Timed Code Treatment Minutes: 60 Minutes  Electronically signed by Manny Fontana PT on 4/6/2021 at 5:23 PM

## 2021-04-20 ENCOUNTER — HOSPITAL ENCOUNTER (OUTPATIENT)
Dept: PHYSICAL THERAPY | Age: 66
Setting detail: THERAPIES SERIES
Discharge: HOME | End: 2021-04-20
Payer: MEDICARE

## 2021-04-20 PROCEDURE — 97110 THERAPEUTIC EXERCISES: CPT

## 2021-04-20 PROCEDURE — 97530 THERAPEUTIC ACTIVITIES: CPT

## 2021-04-20 PROCEDURE — 97140 MANUAL THERAPY 1/> REGIONS: CPT

## 2021-04-20 NOTE — PROGRESS NOTES
Physical Therapy  Daily Treatment Note  Date: 2021  Patient Name: Abraham Maki  MRN: [id number]     :   1955    Subjective:   General  Chart Reviewed: Yes  Additional Pertinent Hx: History of present Illness: Left Upper Extremity Lymphedema, Stage 2 - Patient was diagnosed with breast cancer 2008 and had a double masectomy in  with a breast reconstruction in . 6 months later she started noticing swelling. She started therapy in  in [facility]. She recived Manual Lymph drainage and short stretch bandage. In  she moved to [facility]  received treatment at [facility] . She underwent Lymphaticovenous Bypass on  and has been wrapping her arm at least 3x a week. She still has notable swelling but she reports she felt instant relief after surgery. She reports surgeon stated it can take 3-4 months to lose more swelling. Family / Caregiver Present: No  Referring Practitioner: Mulu Mccabe  PT Visit Information  PT Insurance Information: Medicare/ [employer]  Total # of Visits Approved: 15  Total # of Visits to Date: 2  Progress Note Due Date: 21  Subjective  Subjective: Patient reports she was released from her surgeon.  She also states she has not been wrapping her arm  Pain Screening  Patient Currently in Pain: No  Vital Signs  Patient Currently in Pain: No       Treatment Activities:                                    Exercises  Exercise 1: MLD (not today)  Exercise 2: Self MLD: educated on all steps of Left arm self MLD (continued training needed)  Exercise 3: Compression Wrapping: fabricated hand arm reduction kit  Exercise 4: Skin Care not today  Exercise 5: Exercises:  arm circles, elbow flexion, shoulder abduction and flexion, arm push ups,finger opposition, fist pumps, wrist flexion and extension  Exercise 6: Measurements: and Ldex score not today                               Assessment:   Conditions Requiring Skilled Therapeutic Intervention  Assessment:  entered the

## 2021-04-21 ENCOUNTER — APPOINTMENT (OUTPATIENT)
Dept: PHYSICAL THERAPY | Age: 66
End: 2021-04-21
Payer: MEDICARE

## 2021-04-26 ENCOUNTER — HOSPITAL ENCOUNTER (OUTPATIENT)
Dept: PHYSICAL THERAPY | Age: 66
Setting detail: THERAPIES SERIES
Discharge: HOME | End: 2021-04-26
Payer: MEDICARE

## 2021-04-26 PROCEDURE — 97110 THERAPEUTIC EXERCISES: CPT

## 2021-04-26 PROCEDURE — 97530 THERAPEUTIC ACTIVITIES: CPT

## 2021-04-26 PROCEDURE — 97140 MANUAL THERAPY 1/> REGIONS: CPT

## 2021-04-26 NOTE — PROGRESS NOTES
Physical Therapy  Daily Treatment Note  Date: 2021  Patient Name: Neo Lara  MRN: [id number]     :   1955    Subjective:   General  Chart Reviewed: Yes  Additional Pertinent Hx: History of present Illness: Left Upper Extremity Lymphedema, Stage 2 - Patient was diagnosed with breast cancer 2008 and had a double masectomy in  with a breast reconstruction in . 6 months later she started noticing swelling. She started therapy in  in [facility]. She recived Manual Lymph drainage and short stretch bandage. In  she moved to [facility]  received treatment at [facility] . She underwent Lymphaticovenous Bypass on  and has been wrapping her arm at least 3x a week. She still has notable swelling but she reports she felt instant relief after surgery. She reports surgeon stated it can take 3-4 months to lose more swelling.   Family / Caregiver Present: No  Referring Practitioner: Judge Dickerson  PT Visit Information  PT Insurance Information: Medicare/ [employer]  Total # of Visits Approved: 15  Total # of Visits to Date: 3  Progress Note Due Date: 21  Subjective  Subjective: Patient reports she went to [facility] this weekend so she did not perform self mLD but she did wear her compression  Pain Screening  Patient Currently in Pain: No  Vital Signs  Patient Currently in Pain: No       Treatment Activities:                                    Exercises  Exercise 1: MLD opened AAA and PIERCE with full arm tretament  Exercise 2: Self MLD: educated on all steps of Left arm self MLD (continued training needed) not today  Exercise 3: Compression Wrapping: fabricated hand arm reduction kit and arm due to fluid loss  Exercise 4: Skin Care not today  Exercise 5: Exercises:  elbow flexion , shoulder flexion and  abduction  Exercise 6: Measurements: and Ldex score not today  Exercise 7: KT tape applied to fingers and dorsum of hand                               Assessment:   Conditions Requiring Skilled Therapeutic Intervention  Assessment:  entered the clinic with reduction kit donned. PT assessed reduction and due to decrease in limb size the hand and arm reduction kit had to be refabricated to fit patient arm and hand appropriately. During todays treatment PT opened the anastamosis for the anterior and posterior axillary axillary. Following PT applied KT tape to help decrease pressure in the dorsum of the hand and facilitate more fluid movement out of that specific area. Patient also performed exercises with compression donned. Patient reported at the end of treatment her compression garment felt more snug after refabricating it. Treatment Diagnosis: left UE swelling  Prognosis: Good  Decision Making: Medium Complexity  REQUIRES PT FOLLOW UP: Yes(3 month followup)  Treatment Initiated : education. measuring  Discharge Recommendations: 3-5 sessions per week    Goals:  Short term goals  Time Frame for Short term goals: 1-2 weeks  Short term goal 1: Patient will be indep with HEP  Short term goal 2: Patient will be indep with self MLD  Short term goal 3: Patient will decrease 2-5 cm in each placements of the left UE  Long term goals  Time Frame for Long term goals : 4-8 weeks  Long term goal 1: Patient to be indep to don and doff compression garments properly without undue skin friction to reduce risk for skin breakdown, and be Indep with wearing schedule. Long term goal 2: Patient Lymphedema Life Impact Scale Impairment score will decrease by 20%  Long term goal 3: Patient L-Dex score will decrease by 10 points  Long term goal 4: PT will recommend proper maintenance garment for daytime wear.   Patient Goals   Patient goals : I want to decrease arm size to prepare for surgery  Plan:    Plan  Times per week: 3  Plan weeks: 4-8  Current Treatment Recommendations: Strengthening, Manual Lymphatic Drainage, ROM, Home Exercise Program, IADL Training  Timed Code Treatment Minutes: 53 Minutes     Therapy Time Individual Concurrent Group Co-treatment   Time In 0900         Time Out 0953         Minutes 53         Timed Code Treatment Minutes: 48 Minutes  Electronically signed by Yessica Kaplan PT on 4/26/2021 at 10:08 AM Statement Selected

## 2021-04-27 ENCOUNTER — HOSPITAL ENCOUNTER (OUTPATIENT)
Dept: PHYSICAL THERAPY | Age: 66
Setting detail: THERAPIES SERIES
Discharge: HOME | End: 2021-04-27
Payer: MEDICARE

## 2021-04-27 PROCEDURE — 97110 THERAPEUTIC EXERCISES: CPT

## 2021-04-27 PROCEDURE — 97530 THERAPEUTIC ACTIVITIES: CPT

## 2021-04-27 PROCEDURE — 97140 MANUAL THERAPY 1/> REGIONS: CPT

## 2021-04-27 NOTE — PROGRESS NOTES
Physical Therapy  Daily Treatment Note  Date: 2021  Patient Name: Haylee Rubalcava  MRN: [id number]     :   1955    Subjective:   General  Chart Reviewed: Yes  Additional Pertinent Hx: History of present Illness: Left Upper Extremity Lymphedema, Stage 2 - Patient was diagnosed with breast cancer 2008 and had a double masectomy in  with a breast reconstruction in . 6 months later she started noticing swelling. She started therapy in  in [facility]. She recived Manual Lymph drainage and short stretch bandage. In  she moved to [facility]  received treatment at [facility] . She underwent Lymphaticovenous Bypass on  and has been wrapping her arm at least 3x a week. She still has notable swelling but she reports she felt instant relief after surgery. She reports surgeon stated it can take 3-4 months to lose more swelling. Family / Caregiver Present: No  Referring Practitioner: Merry Schneider  PT Visit Information  PT Insurance Information: Medicare/ [employer]  Total # of Visits Approved: 15  Total # of Visits to Date: 4  Progress Note Due Date: 21  Subjective  Subjective: Patient states her and her  have noticed a decrease in size of limb.  She states she did perfrom her MLD  Pain Screening  Patient Currently in Pain: No  Vital Signs  Patient Currently in Pain: No       Treatment Activities:                                    Exercises  Exercise 1: MLD opened AAA and PIERCE with full arm tretament  Exercise 2: Self MLD: educated on all steps of Left arm self MLD (continued training needed)  Exercise 3: Compression Wrapping: education on placement  hand arm reduction kit and arm due to fluid loss  Exercise 4: Skin Care not today  Exercise 5: Exercises:  arm bike fwd/bkwd and arm press 15x  Exercise 6: Measurements: and Ldex score yes : 66.0  Exercise 7: KT tape removed                               Assessment:   Conditions Requiring Skilled Therapeutic Intervention  Assessment:  entered the clinic with reduction kit donned. Patient reports her arm sleeve keeps sliding down. PT assessed patient bernadette  reduction and she is not pulling her reduction kit far enough which causes it to fall down more once applied. During todays treatment PT opened the anastamosis for the anterior and posterior axillary axillary and full arm tretament. PT also assessed patient perform all the steps of self MLD requiring minimal hand iver hand placement for correct movements. PT removed KT tape. Patient also performed exercises with compression donned- chest press and arm ergometer on level 2 fwrd and bkwrd. Patient L-Dex score was also measured and it has decreasd to 66.0 from 74.1 from the last time she was measured. Treatment Diagnosis: left UE swelling  Prognosis: Good  Decision Making: Medium Complexity  REQUIRES PT FOLLOW UP: Yes(3 month followup)  Treatment Initiated : education. measuring  Discharge Recommendations: 3-5 sessions per week    Goals:  Short term goals  Time Frame for Short term goals: 1-2 weeks  Short term goal 1: Patient will be indep with HEP  Short term goal 2: Patient will be indep with self MLD  Short term goal 3: Patient will decrease 2-5 cm in each placements of the left UE  Long term goals  Time Frame for Long term goals : 4-8 weeks  Long term goal 1: Patient to be indep to don and doff compression garments properly without undue skin friction to reduce risk for skin breakdown, and be Indep with wearing schedule. Long term goal 2: Patient Lymphedema Life Impact Scale Impairment score will decrease by 20%  Long term goal 3: Patient L-Dex score will decrease by 10 points  Long term goal 4: PT will recommend proper maintenance garment for daytime wear.   Patient Goals   Patient goals : I want to decrease arm size to prepare for surgery  Plan:       Timed Code Treatment Minutes: 60 Minutes     Therapy Time   Individual Concurrent Group Co-treatment   Time In 1500         Time Out 1600 Minutes 60         Timed Code Treatment Minutes: 60 Minutes  Electronically signed by Shwetha Mabry PT on 4/27/2021 at 4:18 PM

## 2021-04-29 ENCOUNTER — HOSPITAL ENCOUNTER (OUTPATIENT)
Dept: PHYSICAL THERAPY | Age: 66
Setting detail: THERAPIES SERIES
Discharge: HOME | End: 2021-04-29
Payer: MEDICARE

## 2021-04-29 PROCEDURE — 97750 PHYSICAL PERFORMANCE TEST: CPT

## 2021-04-29 PROCEDURE — 97530 THERAPEUTIC ACTIVITIES: CPT

## 2021-04-29 PROCEDURE — 97110 THERAPEUTIC EXERCISES: CPT

## 2021-04-29 NOTE — PROGRESS NOTES
Physical Therapy  Daily Treatment Note  Date: 2021  Patient Name: Tamia Orellana  MRN: [id number]     :   1955    Subjective:   General  Chart Reviewed: Yes  Additional Pertinent Hx: History of present Illness: Left Upper Extremity Lymphedema, Stage 2 - Patient was diagnosed with breast cancer 2008 and had a double masectomy in  with a breast reconstruction in . 6 months later she started noticing swelling. She started therapy in  in [facility]. She recived Manual Lymph drainage and short stretch bandage. In  she moved to [facility]  received treatment at [facility] . She underwent Lymphaticovenous Bypass on  and has been wrapping her arm at least 3x a week. She still has notable swelling but she reports she felt instant relief after surgery. She reports surgeon stated it can take 3-4 months to lose more swelling. Family / Caregiver Present: No  Referring Practitioner: June Hunter  PT Visit Information  PT Insurance Information: Medicare/ [employer]  Total # of Visits Approved: 15  Total # of Visits to Date: 5  Progress Note Due Date: 21  Subjective  Subjective: Patient reports no pain.  She used her pump last night and dont understand why her hand is more swollen today because yesterday it looked so good  Pain Screening  Patient Currently in Pain: No  Vital Signs  Patient Currently in Pain: No       Treatment Activities:                                    Exercises  Exercise 1: MLD opened AAA and PIERCE with full arm tretament not today  Exercise 2: Self MLD: educated on all steps of Left arm self MLD (continued training needed)- training on hand placement and stretching of skin without sliding  Exercise 3: Compression Wrapping: education on placement  hand arm reduction kit and arm due to fluid loss; PT added circular comprex padding to dorsum of hand  Exercise 4: Skin Care not today  Exercise 5: Exercises:  arm bike fwd/bkwd  Exercise 6: Measurements: yes and Ldex score no  Exercise 7: KT tape removed  Other Activities  Other Activities: Other (see comment)  Comment: OPB:12.2,33,80.9,51.7,93,61.9,03.1,25.3,29.195.5                            Assessment:   Conditions Requiring Skilled Therapeutic Intervention  Assessment:  entered the treatment room. She took off her reduction kit and PT noted patient dorsum of her hand to be more swollen then her last two visits. Patient states she doesnt understand because yesterday it was almost as flat as her unaffected limb. Patient admitted she did use her pump last night but did not perform self MLD prior. Though patient arm looks more swollen, her measurments indicated a decrease in size and the dorum of the hand was the same from last week. Patient hand measurment was smaller the beginning of the week. PT added orange comprex padding to add more compression to the dorsum of her hand. She was instructed to place under her arm covering to hold it in place and then bernadette hand wrap first instead of last. Patient reports she likes the hand wrap undernaeth the arm reduction kit because she says it feels like its not going to slide down as easily. PT also assessed patient perform hand movements for self MLD she was still sliding on her skin but after further education and hand over hand education she was able to perform correctly. Following patient performed the ergometer forward and backwards  Treatment Diagnosis: left UE swelling  Prognosis: Good  Decision Making: Medium Complexity  REQUIRES PT FOLLOW UP: Yes(3 month followup)  Treatment Initiated : education.  measuring  Discharge Recommendations: 3-5 sessions per week    Goals:  Short term goals  Time Frame for Short term goals: 1-2 weeks  Short term goal 1: Patient will be indep with HEP  Short term goal 2: Patient will be indep with self MLD  Short term goal 3: Patient will decrease 2-5 cm in each placements of the left UE  Long term goals  Time Frame for Long term goals : 4-8 weeks  Long term goal 1: Patient to be indep to don and doff compression garments properly without undue skin friction to reduce risk for skin breakdown, and be Indep with wearing schedule. Long term goal 2: Patient Lymphedema Life Impact Scale Impairment score will decrease by 20%  Long term goal 3: Patient L-Dex score will decrease by 10 points  Long term goal 4: PT will recommend proper maintenance garment for daytime wear.   Patient Goals   Patient goals : I want to decrease arm size to prepare for surgery  Plan:    Plan  Times per week: 3  Plan weeks: 4-8  Current Treatment Recommendations: Strengthening, Manual Lymphatic Drainage, ROM, Home Exercise Program, IADL Training  Timed Code Treatment Minutes: 47 Minutes     Therapy Time   Individual Concurrent Group Co-treatment   Time In 1300         Time Out 6243         Minutes 47         Timed Code Treatment Minutes: 47 Minutes  Electronically signed by Mary Salcedo, PT on 4/29/2021 at 2:11 PM

## 2021-05-04 ENCOUNTER — HOSPITAL ENCOUNTER (OUTPATIENT)
Dept: PHYSICAL THERAPY | Age: 66
Setting detail: THERAPIES SERIES
Discharge: HOME | End: 2021-05-04
Payer: MEDICARE

## 2021-05-04 PROCEDURE — 97750 PHYSICAL PERFORMANCE TEST: CPT

## 2021-05-04 PROCEDURE — 97140 MANUAL THERAPY 1/> REGIONS: CPT

## 2021-05-04 PROCEDURE — 97110 THERAPEUTIC EXERCISES: CPT

## 2021-05-04 NOTE — PROGRESS NOTES
Activities: Other (see comment)  Comment: BY.4,79,03.4,73.4,76,11.8,97.2,17.5,[id number]   :  22,21.4,27.9,32.9,34.1,36.2,38.5,40.4,41.9,42.2 Ldex:  :66 : 75.8                            Assessment:   Conditions Requiring Skilled Therapeutic Intervention  Assessment:  is being seen for a PT reassessment . Patinet circumference measurements have been decreasing th elast two weeks, but this week her measurmeents have slightly increased in some areas. Patient stated her diet over the weekend consisted of meals that had alot of salt, which could be the cause of her increased swelling this week. Her L-Dex score also decreased from her intial eval on the  and today it is slightly higher. PT recommended to decrease salt intake. Patient LYmphedema Life Impact Scale has decreased from 51% to 34% impairment. During todays treatment PT opened the anastamosis for the anterior Axillary axillary and axillary inguinal and full arm tretament. Patient performed exercises with compression donned- arm ergometer on level 2 fwrd and bkwrd. Treatment Diagnosis: left UE swelling  Prognosis: Good  Decision Making: Medium Complexity  REQUIRES PT FOLLOW UP: Yes(3 month followup)  Treatment Initiated : education.  measuring  Discharge Recommendations: 3-5 sessions per week    Goals:  Short term goals  Time Frame for Short term goals: 1-2 weeks  Short term goal 1: Patient will be indep with HEP met(5/4: performs daily)  Short term goal 2: Patient will be indep with self MLD met(5/4: performs daily)  Short term goal 3: Patient will decrease 2-5 cm in each placements of the left UE progress(5/4: over the last 2 weeks patient has had decreased circumference measurments but numbers have increased this week)  Long term goals  Time Frame for Long term goals : 4-8 weeks  Long term goal 1: Patient to be indep to don and doff compression garments properly without undue skin friction to reduce risk for skin breakdown, and be Indep with wearing schedule. progress(5/4: still in decongestive phase)  Long term goal 2: Patient Lymphedema Life Impact Scale Impairment score will decrease by 20% progress(5/4: decreased by 17%)  Long term goal 3: Patient L-Dex score will decrease by 10 points progress(5/4: the week of 4/27 her L-dex score decreased greater than 10% but it has increased greater than her evaluation score this week)  Long term goal 4: PT will recommend proper maintenance garment for daytime wear.  progress(5/4: still in decongestive phase)  Patient Goals   Patient goals : I want to decrease arm size to prepare for surgery  Plan:    Plan  Times per week: 3  Plan weeks: 4-8  Current Treatment Recommendations: Strengthening, Manual Lymphatic Drainage, ROM, Home Exercise Program, IADL Training  Timed Code Treatment Minutes: 54 Minutes     Therapy Time   Individual Concurrent Group Co-treatment   Time In 1300         Time Out 1354         Minutes 54         Timed Code Treatment Minutes: 54 Minutes  Electronically signed by Yessica Kaplan PT on 5/4/2021 at 4:35 PM

## 2021-05-05 ENCOUNTER — APPOINTMENT (OUTPATIENT)
Dept: PHYSICAL THERAPY | Age: 66
End: 2021-05-05
Payer: MEDICARE

## 2021-05-12 ENCOUNTER — HOSPITAL ENCOUNTER (OUTPATIENT)
Dept: PHYSICAL THERAPY | Age: 66
Setting detail: THERAPIES SERIES
Discharge: HOME | End: 2021-05-12
Payer: MEDICARE

## 2021-05-12 PROCEDURE — 97140 MANUAL THERAPY 1/> REGIONS: CPT

## 2021-05-12 NOTE — PROGRESS NOTES
Physical Therapy  Daily Treatment Note  Date: 2021  Patient Name: Pancho Avilez  MRN: [id number]     :   1955    Subjective:   General  Chart Reviewed: Yes  Additional Pertinent Hx: History of present Illness: Left Upper Extremity Lymphedema, Stage 2 - Patient was diagnosed with breast cancer 2008 and had a double masectomy in  with a breast reconstruction in . 6 months later she started noticing swelling. She started therapy in  in [facility]. She recived Manual Lymph drainage and short stretch bandage. In  she moved to [facility]  received treatment at [facility] . She underwent Lymphaticovenous Bypass on  and has been wrapping her arm at least 3x a week. She still has notable swelling but she reports she felt instant relief after surgery. She reports surgeon stated it can take 3-4 months to lose more swelling. Family / Caregiver Present: No  Referring Practitioner: Katerin Espinal  PT Visit Information  PT Insurance Information: Medicare/ [employer]  Total # of Visits Approved: 15  Total # of Visits to Date: 7  Progress Note Due Date: 21  Subjective  Subjective: My hand goes down at night with my pump but during the day my hand will swell some. Pain Screening  Patient Currently in Pain: No  Vital Signs  Patient Currently in Pain: No       Treatment Activities:       Exercises  Exercise 1: MLD opened AAA and AI with full arm tretament  Exercise 2: Self MLD: educated on all steps of Left arm self MLD (continued training needed)- training on hand placement and stretching of skin without sliding not today  Exercise 3: Compression Wrapping: education on placement  hand arm reduction kit and arm due to fluid loss;   Exercise 4: Skin Care not today  Exercise 5: Exercises:  arm bike fwd/bkwd not today  Exercise 6: Measurements: no and Ldex score no     Assessment:   Conditions Requiring Skilled Therapeutic Intervention  Assessment:  entered the treatment room and removed her reduction kit and noted that the dorsum of her hand to be slightly swollen in one area. Patient states that she uses her pump at night and  attempted to assist with self MLD this morning. She was instructed to place under her arm covering to hold it in place and then bernadette hand wrap first instead of last. Patient reports again she likes the hand wrap undernaeth the arm reduction kit because she says it feels like it is not sliding down as easily. Educated to tighten straps til marks line up to keep reduction kit tight and not allow swelling to occur. During todays treatment therapist opened the anastamosis for the anterior Axillary axillary and left axillary inguinal and full left arm tretament. Patient declined performance of the ergometer forward and backwards  Treatment Diagnosis: left UE swelling  REQUIRES PT FOLLOW UP: Yes(3 month followup)  Discharge Recommendations: 3-5 sessions per week    Goals:  Short term goals  Time Frame for Short term goals: 1-2 weeks  Short term goal 1: Patient will be indep with HEP met(5/4: performs daily)  Short term goal 2: Patient will be indep with self MLD met(5/4: performs daily)  Short term goal 3: Patient will decrease 2-5 cm in each placements of the left UE progress(5/4: over the last 2 weeks patient has had decreased circumference measurments but numbers have increased this week)  Long term goals  Time Frame for Long term goals : 4-8 weeks  Long term goal 1: Patient to be indep to don and doff compression garments properly without undue skin friction to reduce risk for skin breakdown, and be Indep with wearing schedule.  progress(5/4: still in decongestive phase)  Long term goal 2: Patient Lymphedema Life Impact Scale Impairment score will decrease by 20% progress(5/4: decreased by 17%)  Long term goal 3: Patient L-Dex score will decrease by 10 points progress(5/4: the week of 4/27 her L-dex score decreased greater than 10% but it has increased greater than her evaluation score this week)  Long term goal 4: PT will recommend proper maintenance garment for daytime wear.  progress(5/4: still in decongestive phase)  Patient Goals   Patient goals : I want to decrease arm size to prepare for surgery  Plan:    Plan  Times per week: 3  Plan weeks: 4-8  Current Treatment Recommendations: Strengthening, Manual Lymphatic Drainage, ROM, Home Exercise Program, IADL Training  Timed Code Treatment Minutes: 65 Minutes     Therapy Time   Individual Concurrent Group Co-treatment   Time In 8675         Time Out 2610         Minutes 65         Timed Code Treatment Minutes: 65 Minutes  Electronically signed by Cash Stock PTA on 5/12/2021 at 3:09 PM

## 2021-05-13 ENCOUNTER — APPOINTMENT (OUTPATIENT)
Dept: PHYSICAL THERAPY | Age: 66
End: 2021-05-13
Payer: MEDICARE

## 2021-05-17 ENCOUNTER — HOSPITAL ENCOUNTER (OUTPATIENT)
Dept: PHYSICAL THERAPY | Age: 66
Setting detail: THERAPIES SERIES
Discharge: HOME | End: 2021-05-17
Payer: MEDICARE

## 2021-05-17 PROCEDURE — 97110 THERAPEUTIC EXERCISES: CPT

## 2021-05-17 PROCEDURE — 97140 MANUAL THERAPY 1/> REGIONS: CPT

## 2021-05-17 PROCEDURE — 97530 THERAPEUTIC ACTIVITIES: CPT

## 2021-05-17 NOTE — PROGRESS NOTES
Physical Therapy  Daily Treatment Note  Date: 2021  Patient Name: Susanne Wolff  MRN: [id number]     :   1955    Subjective:   General  Chart Reviewed: Yes  Additional Pertinent Hx: History of present Illness: Left Upper Extremity Lymphedema, Stage 2 - Patient was diagnosed with breast cancer 2008 and had a double masectomy in  with a breast reconstruction in . 6 months later she started noticing swelling. She started therapy in  in [facility]. She recived Manual Lymph drainage and short stretch bandage. In  she moved to [facility]  received treatment at [facility] . She underwent Lymphaticovenous Bypass on  and has been wrapping her arm at least 3x a week. She still has notable swelling but she reports she felt instant relief after surgery. She reports surgeon stated it can take 3-4 months to lose more swelling. Family / Caregiver Present: No  Referring Practitioner: Guerita Flower  PT Visit Information  PT Insurance Information: Medicare/ [employer]  Total # of Visits Approved: 15  Total # of Visits to Date: 8  Progress Note Due Date: 21  Subjective  Subjective: Patient reports her dorsum of her hand is improving and is better when she does her pump and exercises. Pain Screening  Patient Currently in Pain: No  Vital Signs  Patient Currently in Pain: No       Treatment Activities:                                    Exercises  Exercise 1: MLD opened AAA with full arm tretament  Exercise 2: Self MLD: educated on all steps of Left arm self MLD (continued training needed)- training on hand placement and stretching of skin without sliding not today  Exercise 3: Compression Wrapping: education on placement  hand arm reduction kit and arm due to fluid loss;   Exercise 4: Skin Care not today  Exercise 5: Exercises:  arm bike fwd/bkwd  Exercise 6: Measurements: yes and Ldex score yes  Other Activities  Other Activities: Other (see comment)  Comment: decrease by 10 points progress (5/4: the week of 4/27 her L-dex score decreased greater than 10% but it has increased greater than her evaluation score this week)  Long term goal 4: PT will recommend proper maintenance garment for daytime wear.  progress (5/4: still in decongestive phase)  Patient Goals   Patient goals : I want to decrease arm size to prepare for surgery  Plan:       Timed Code Treatment Minutes: 65 Minutes     Therapy Time   Individual Concurrent Group Co-treatment   Time In 0900         Time Out 1005         Minutes 65         Timed Code Treatment Minutes: 65 Minutes  Electronically signed by Pretty Castrejon PT on 5/17/2021 at 10:43 AM

## 2021-05-18 ENCOUNTER — HOSPITAL ENCOUNTER (OUTPATIENT)
Dept: PHYSICAL THERAPY | Age: 66
Setting detail: THERAPIES SERIES
Discharge: HOME | End: 2021-05-18
Payer: MEDICARE

## 2021-05-18 PROCEDURE — 97530 THERAPEUTIC ACTIVITIES: CPT

## 2021-05-18 PROCEDURE — 97110 THERAPEUTIC EXERCISES: CPT

## 2021-05-18 NOTE — PROGRESS NOTES
Physical Therapy  Daily Treatment Note  Date: 2021  Patient Name: Renae Marion  MRN: [id number]     :   1955    Subjective:   General  Chart Reviewed: Yes  Additional Pertinent Hx: History of present Illness: Left Upper Extremity Lymphedema, Stage 2 - Patient was diagnosed with breast cancer 2008 and had a double masectomy in  with a breast reconstruction in . 6 months later she started noticing swelling. She started therapy in  in [facility]. She recived Manual Lymph drainage and short stretch bandage. In  she moved to [facility]  received treatment at [facility] . She underwent Lymphaticovenous Bypass on  and has been wrapping her arm at least 3x a week. She still has notable swelling but she reports she felt instant relief after surgery. She reports surgeon stated it can take 3-4 months to lose more swelling.   Family / Caregiver Present: No  Referring Practitioner: Hu Oseguera  PT Visit Information  PT Insurance Information: Medicare/ [employer]  Total # of Visits Approved: 15  Total # of Visits to Date: 9  Progress Note Due Date: 21  Subjective  Subjective: Patient reports no problems today  Pain Screening  Patient Currently in Pain: No  Vital Signs  Patient Currently in Pain: No       Treatment Activities:                                    Exercises  Exercise 1: MLD opened AAA with full arm tretament not today  Exercise 2: Self MLD: educated on all steps of Left arm self MLD (continued training needed)- training on hand placement and stretching of skin without sliding not today  Exercise 3: Compression Wrapping: fabricated hand reduction kit  Exercise 4: Skin Care not today  Exercise 5: Exercises:  shoulder flexion, abduction, elbow flexion and extension  Exercise 6: Measurements: no and Ldex score no  Exercise 7: KT tape applied to upper arm and forearm using fan taping with 0-10% pull                               Assessment:   Conditions Requiring Skilled Therapeutic Intervention  Assessment:  entered the clinic with reduction kit donned. PT applied KinesioTape to the upper arm and forearm using fan shaped technique to help improve lymphatic fluid movement. Patient also performed exercises with compression donned. During session, PT also discussed plan for next treatment session which would include: self measuring, review of self MLD and exercises  Treatment Diagnosis: left UE swelling  Prognosis: Good  Decision Making: Medium Complexity  REQUIRES PT FOLLOW UP: Yes (3 month followup)  Treatment Initiated : education. measuring  Discharge Recommendations: 3-5 sessions per week    Goals:  Short term goals  Time Frame for Short term goals: 1-2 weeks  Short term goal 1: Patient will be indep with HEP met (5/4: performs daily)  Short term goal 2: Patient will be indep with self MLD met (5/4: performs daily)  Short term goal 3: Patient will decrease 2-5 cm in each placements of the left UE progress (5/4: over the last 2 weeks patient has had decreased circumference measurments but numbers have increased this week)  Long term goals  Time Frame for Long term goals : 4-8 weeks  Long term goal 1: Patient to be indep to don and doff compression garments properly without undue skin friction to reduce risk for skin breakdown, and be Indep with wearing schedule. progress (5/4: still in decongestive phase)  Long term goal 2: Patient Lymphedema Life Impact Scale Impairment score will decrease by 20% progress (5/4: decreased by 17%)  Long term goal 3: Patient L-Dex score will decrease by 10 points progress (5/4: the week of 4/27 her L-dex score decreased greater than 10% but it has increased greater than her evaluation score this week)  Long term goal 4: PT will recommend proper maintenance garment for daytime wear.  progress (5/4: still in decongestive phase)  Patient Goals   Patient goals : I want to decrease arm size to prepare for surgery  Plan:       Timed Code Treatment Minutes: 48 Minutes     Therapy Time   Individual Concurrent Group Co-treatment   Time In 1300         Time Out 1348         Minutes 48         Timed Code Treatment Minutes: 48 Minutes  Electronically signed by Shahla Forrest PT on 5/18/2021 at 1:56 PM

## 2021-05-20 ENCOUNTER — HOSPITAL ENCOUNTER (OUTPATIENT)
Dept: PHYSICAL THERAPY | Age: 66
Setting detail: THERAPIES SERIES
Discharge: HOME | End: 2021-05-20
Payer: MEDICARE

## 2021-05-20 PROCEDURE — 97530 THERAPEUTIC ACTIVITIES: CPT

## 2021-05-24 ENCOUNTER — HOSPITAL ENCOUNTER (OUTPATIENT)
Dept: PHYSICAL THERAPY | Age: 66
Setting detail: THERAPIES SERIES
Discharge: HOME | End: 2021-05-24
Payer: MEDICARE

## 2021-05-24 PROCEDURE — 97750 PHYSICAL PERFORMANCE TEST: CPT

## 2021-05-24 PROCEDURE — 97530 THERAPEUTIC ACTIVITIES: CPT

## 2021-05-24 NOTE — PROGRESS NOTES
XFO:27.0,03,28.1,00.3,45,44.1,15.8,30.2,94.480.9   5/4:  22,21.4,27.9,32.9,34.1,36.2,38.5,40.4,41.9,42.2 Ldex: 4/27 :66 5/4: 75.8 ; 05/17: LUE: 21.8,19.7,26.5,30.4,33.7,35.2,37.2,39,40,42  LDex: 63 5/24: LUE :20.4,19,24.1,30.1,32.5,33.9,36,38,39.4,42.0  L-Dex:61.4                            Assessment:   Conditions Requiring Skilled Therapeutic Intervention  Assessment:  entered the clinic with reduction kit and KT tape donned. PT measured patient for her weekly measurments , L-Dex score which has decreased from last week and for her custom garment. Her order will be faxed in to supply chain to be ordered. She was given a handout on how to apply KT tape properly to the arm using fan shaped technique. Patient will not return to clinic until her garment is received from there we will ensure the sleeve fits properly and that she can bernadette and doff properly as well before discharging. Prognosis: Good  Decision Making: Medium Complexity  REQUIRES PT FOLLOW UP: Yes (3 month followup)  Treatment Initiated : education. measuring  Discharge Recommendations: 3-5 sessions per week    Goals:  Short term goals  Time Frame for Short term goals: 1-2 weeks  Short term goal 1: Patient will be indep with HEP met (5/4: performs daily)  Short term goal 2: Patient will be indep with self MLD met (5/4: performs daily)  Short term goal 3: Patient will decrease 2-5 cm in each placements of the left UE progress (5/4: over the last 2 weeks patient has had decreased circumference measurments but numbers have increased this week)  Long term goals  Time Frame for Long term goals : 4-8 weeks  Long term goal 1: Patient to be indep to don and doff compression garments properly without undue skin friction to reduce risk for skin breakdown, and be Indep with wearing schedule.  progress (5/4: still in decongestive phase)  Long term goal 2: Patient Lymphedema Life Impact Scale Impairment score will decrease by 20% progress (5/4: decreased by 17%)  Long term goal 3: Patient L-Dex score will decrease by 10 points progress (5/4: the week of 4/27 her L-dex score decreased greater than 10% but it has increased greater than her evaluation score this week)  Long term goal 4: PT will recommend proper maintenance garment for daytime wear.  progress (5/4: still in decongestive phase)  Patient Goals   Patient goals : I want to decrease arm size to prepare for surgery  Plan:       Timed Code Treatment Minutes: 47 Minutes     Therapy Time   Individual Concurrent Group Co-treatment   Time In 1430         Time Out 2401         Minutes 47         Timed Code Treatment Minutes: 52 Minutes  Electronically signed by Melo Mazariegos PT on 5/24/2021 at 4:10 PM

## 2021-05-25 ENCOUNTER — APPOINTMENT (OUTPATIENT)
Dept: PHYSICAL THERAPY | Age: 66
End: 2021-05-25
Payer: MEDICARE

## 2021-05-26 ENCOUNTER — APPOINTMENT (OUTPATIENT)
Dept: PHYSICAL THERAPY | Age: 66
End: 2021-05-26
Payer: MEDICARE

## 2021-06-22 ENCOUNTER — HOSPITAL ENCOUNTER (OUTPATIENT)
Dept: PHYSICAL THERAPY | Age: 66
Setting detail: THERAPIES SERIES
Discharge: HOME | End: 2021-06-22
Payer: MEDICARE

## 2021-06-22 PROCEDURE — 97140 MANUAL THERAPY 1/> REGIONS: CPT

## 2021-06-22 PROCEDURE — 97110 THERAPEUTIC EXERCISES: CPT

## 2021-06-22 NOTE — PROGRESS NOTES
Physical Therapy  Daily Treatment Note/Reassessment  Date: 2021  Patient Name: Kana Vyas  MRN: [id number]     :   1955    Subjective:   General  Chart Reviewed: Yes  Additional Pertinent Hx: History of present Illness: Left Upper Extremity Lymphedema, Stage 2 - Patient was diagnosed with breast cancer 2008 and had a double masectomy in  with a breast reconstruction in . 6 months later she started noticing swelling. She started therapy in  in [facility]. She recived Manual Lymph drainage and short stretch bandage. In  she moved to [facility]  received treatment at [facility] . She underwent Lymphaticovenous Bypass on  and has been wrapping her arm at least 3x a week. She still has notable swelling but she reports she felt instant relief after surgery. She reports surgeon stated it can take 3-4 months to lose more swelling. Family / Caregiver Present: No  Referring Practitioner: Zan Gomez  PT Visit Information  PT Insurance Information: Medicare/ BCBS  Total # of Visits Approved: 18  Total # of Visits to Date: 12  Progress Note Due Date: 21  Subjective  Subjective: Patient reports she tries to be compliant with compression and MLD but sometimes the compression is hard to keep on. She also reports she is more swollen than she has been.   Pain Screening  Patient Currently in Pain: No  Vital Signs  Patient Currently in Pain: No       Treatment Activities:                                    Exercises  Exercise 1: MLD opened AAA with full arm tretament  Exercise 2: Self MLD: educated on all steps of Left arm self MLD - training on hand placement and stretching of skin with use of spatula   not today  Exercise 3: Compression Wrapping: fabricated hand reduction kit  Exercise 4: Skin Care not today  Exercise 5: Exercises:  shoulder flexion, abduction, elbow flexion and extension    Arm bike level 1 5 minutes  Exercise 6: Measurements: no and Ldex score no  ---measurement for custom garment - not today  Exercise 7: KT tape - given print out instructions how to apply KT Tape to arm   not today                               Assessment:   Conditions Requiring Skilled Therapeutic Intervention  Assessment: Patient initially scheduled to take measurements for UE garment and gauntlet for long term maintenance however patient has not been seen in clinic for several weeks and had noticeable increase in swelling in both hand and arm. Discussed with patient to change plan to begin 2-3 weeks of treatment to continue reducing edema prior to ordering garment. Patient and other CLT in agreement with this plan. MLD performed in supine, reduction kit donned, and exercises performed with patient. We will begin treatment with weekly measurements prior to measuring forgarment. Prognosis: Good  Decision Making: Medium Complexity  REQUIRES PT FOLLOW UP: Yes (3 month followup)  Treatment Initiated : MLD, exercises, education  Discharge Recommendations: 3-5 sessions per week    Goals:  Short term goals  Time Frame for Short term goals: 1-2 weeks  Short term goal 1: Patient will be indep with HEP met (5/4: performs daily)  Short term goal 2: Patient will be indep with self MLD met (5/4: performs daily)  Short term goal 3: Patient will decrease 2-5 cm in each placements of the left UE progress (5/4: over the last 2 weeks patient has had decreased circumference measurments but numbers have increased this week)  Long term goals  Time Frame for Long term goals : 4-8 weeks  Long term goal 1: Patient to be indep to don and doff compression garments properly without undue skin friction to reduce risk for skin breakdown, and be Indep with wearing schedule.  progress (5/4: still in decongestive phase)  Long term goal 2: Patient Lymphedema Life Impact Scale Impairment score will decrease by 20% progress (5/4: decreased by 17%)  Long term goal 3: Patient L-Dex score will decrease by 10 points progress (5/4: the week of 4/27 her L-dex score decreased greater than 10% but it has increased greater than her evaluation score this week)  Long term goal 4: PT will recommend proper maintenance garment for daytime wear.  progress (5/4: still in decongestive phase)  Patient Goals   Patient goals : I want to decrease arm size to prepare for surgery  Plan:    Plan  Times per week: 2-3  Plan weeks: 4-8  Current Treatment Recommendations: Strengthening, Manual Lymphatic Drainage, ROM, Home Exercise Program, IADL Training  Timed Code Treatment Minutes: 62 Minutes     Therapy Time   Individual Concurrent Group Co-treatment   Time In 1310         Time Out 1412         Minutes 62         Timed Code Treatment Minutes: 62 Minutes  Electronically signed by Elijah Haque PT on 6/22/2021 at 2:44 PM

## 2021-06-29 ENCOUNTER — HOSPITAL ENCOUNTER (OUTPATIENT)
Dept: PHYSICAL THERAPY | Age: 66
Setting detail: THERAPIES SERIES
Discharge: HOME | End: 2021-06-29
Payer: MEDICARE

## 2021-06-29 PROCEDURE — 97140 MANUAL THERAPY 1/> REGIONS: CPT

## 2021-06-29 PROCEDURE — 97110 THERAPEUTIC EXERCISES: CPT

## 2021-06-29 NOTE — PROGRESS NOTES
and Ldex score no  ---measurement for custom garment - not today  Exercise 7: KT tape - given print out instructions how to apply KT Tape to arm   not today                               Assessment:   Conditions Requiring Skilled Therapeutic Intervention  Assessment: Patient returned to continue CDT to reduce swelling in LUE. Patient with decrease in swelling noted since previous treatment. One area of skin irritation starting to form an abrasion noted in medial aspect of upper arm. Patient reports not feeling pain with this however discussed with her the importance of monitoring this closely and making sure reduction kit is not causing a wound. Patient with compression garment from previous CDT to try as an alternative at times to the reduction kit. She was able to don the garment with some instructions to bring garment higher when donning next time. Patient is progressing well toward goals. Prognosis: Good  Decision Making: Medium Complexity  REQUIRES PT FOLLOW UP: Yes (3 month followup)  Treatment Initiated : MLD, exercises, education  Discharge Recommendations: 3-5 sessions per week    Goals:  Short term goals  Time Frame for Short term goals: 1-2 weeks  Short term goal 1: Patient will be indep with HEP met (5/4: performs daily)  Short term goal 2: Patient will be indep with self MLD met (5/4: performs daily)  Short term goal 3: Patient will decrease 2-5 cm in each placements of the left UE progress (5/4: over the last 2 weeks patient has had decreased circumference measurments but numbers have increased this week)  Long term goals  Time Frame for Long term goals : 4-8 weeks  Long term goal 1: Patient to be indep to don and doff compression garments properly without undue skin friction to reduce risk for skin breakdown, and be Indep with wearing schedule.  progress (5/4: still in decongestive phase)  Long term goal 2: Patient Lymphedema Life Impact Scale Impairment score will decrease by 20% progress (5/4:

## 2021-07-02 ENCOUNTER — HOSPITAL ENCOUNTER (OUTPATIENT)
Dept: PHYSICAL THERAPY | Age: 66
Setting detail: THERAPIES SERIES
Discharge: HOME | End: 2021-07-02
Payer: MEDICARE

## 2021-07-02 PROCEDURE — 97140 MANUAL THERAPY 1/> REGIONS: CPT

## 2021-07-02 PROCEDURE — 97110 THERAPEUTIC EXERCISES: CPT

## 2021-07-02 PROCEDURE — 97750 PHYSICAL PERFORMANCE TEST: CPT

## 2021-07-02 NOTE — PROGRESS NOTES
Physical Therapy  Daily Treatment Note  Date: 2021  Patient Name: Sergio Chadwick  MRN: [id number]     :   1955    Subjective:   General  Chart Reviewed: Yes  Additional Pertinent Hx: History of present Illness: Left Upper Extremity Lymphedema, Stage 2 - Patient was diagnosed with breast cancer 2008 and had a double masectomy in  with a breast reconstruction in . 6 months later she started noticing swelling. She started therapy in  in [facility]. She recived Manual Lymph drainage and short stretch bandage. In  she moved to [facility]  received treatment at [facility] . She underwent Lymphaticovenous Bypass on  and has been wrapping her arm at least 3x a week. She still has notable swelling but she reports she felt instant relief after surgery. She reports surgeon stated it can take 3-4 months to lose more swelling. Family / Caregiver Present: No  Referring Practitioner: Evelyn Villavicencio  PT Visit Information  PT Insurance Information: Medicare/ BCBS  Total # of Visits Approved: 18  Total # of Visits to Date: 14  Progress Note Due Date: 21  Subjective  Subjective: Patient reports she has been monitoring the wound on her inner arm.   Pain Screening  Patient Currently in Pain: No  Vital Signs  Patient Currently in Pain: No       Treatment Activities:                                    Exercises  Exercise 1: MLD opened AAA with full arm tretament  NO short neck portion of treatment  Exercise 2: Self MLD: educated on all steps of Left arm self MLD - training on hand placement and stretching of skin with use of spatula   not today  Exercise 3: Compression Wrapping: fabricated hand reduction kit  Exercise 4: Skin Care yes      Bandage placed on inner arm to cover small wound - education given to patient on how to care for area and what to look for that might be concerning  Exercise 5: Exercises:  shoulder flexion, abduction, elbow flexion and extension, fists  x 10  Arm bike level 1 5 minutes  Exercise 6: Measurements: no and Ldex score yes  Taken seated for better measurement  ---measurement for custom garment - not today  Exercise 7: KT tape - given print out instructions how to apply KT Tape to arm   not today                               Assessment:   Conditions Requiring Skilled Therapeutic Intervention  Assessment: Full CDT performed today with emphasis on skin care in educating patient on how to care for small wound on inner L arm. Patient does not know what caused this area due to impaired sensation. The wound does not appear worse than at previous visit and appears to be healing at this time. Patient encouraged to protect this area with bandages to avoid possible friction from reduction kit. She was also encouraged to perform visual checks frequently. Patient is progressing well with reduction of swelling shown with decreased L Dex score today compared to all previous scores. Prognosis: Good  Decision Making: Medium Complexity  REQUIRES PT FOLLOW UP: Yes (3 month followup)  Treatment Initiated : MLD, exercises, education  Discharge Recommendations: 3-5 sessions per week    Goals:  Short term goals  Time Frame for Short term goals: 1-2 weeks  Short term goal 1: Patient will be indep with HEP met (5/4: performs daily)  Short term goal 2: Patient will be indep with self MLD met (5/4: performs daily)  Short term goal 3: Patient will decrease 2-5 cm in each placements of the left UE progress (5/4: over the last 2 weeks patient has had decreased circumference measurments but numbers have increased this week)  Long term goals  Time Frame for Long term goals : 4-8 weeks  Long term goal 1: Patient to be indep to don and doff compression garments properly without undue skin friction to reduce risk for skin breakdown, and be Indep with wearing schedule.  progress (5/4: still in decongestive phase)  Long term goal 2: Patient Lymphedema Life Impact Scale Impairment score will decrease by 20% progress (5/4: decreased by 17%)  Long term goal 3: Patient L-Dex score will decrease by 10 points progress (5/4: the week of 4/27 her L-dex score decreased greater than 10% but it has increased greater than her evaluation score this week)  Long term goal 4: PT will recommend proper maintenance garment for daytime wear.  progress (5/4: still in decongestive phase)  Patient Goals   Patient goals : I want to decrease arm size to prepare for surgery  Plan:    Plan  Times per week: 2-3  Plan weeks: 4-8  Current Treatment Recommendations: Strengthening, Manual Lymphatic Drainage, ROM, Home Exercise Program, IADL Training  Timed Code Treatment Minutes: 55 Minutes     Therapy Time   Individual Concurrent Group Co-treatment   Time In 1130         Time Out 1225         Minutes 55         Timed Code Treatment Minutes: 55 Minutes  Electronically signed by Missy Dow PT on 7/2/2021 at 12:50 PM

## 2021-07-06 ENCOUNTER — APPOINTMENT (OUTPATIENT)
Dept: PHYSICAL THERAPY | Age: 66
End: 2021-07-06
Payer: MEDICARE

## 2021-07-13 ENCOUNTER — HOSPITAL ENCOUNTER (OUTPATIENT)
Dept: PHYSICAL THERAPY | Age: 66
Setting detail: THERAPIES SERIES
Discharge: HOME | End: 2021-07-13
Payer: MEDICARE

## 2021-07-13 PROCEDURE — 97110 THERAPEUTIC EXERCISES: CPT

## 2021-07-13 PROCEDURE — 97140 MANUAL THERAPY 1/> REGIONS: CPT

## 2021-07-13 NOTE — PROGRESS NOTES
Physical Therapy  Daily Treatment Note  Date: 2021  Patient Name: Jose Adame  MRN: [id number]     :   1955    Subjective:   General  Chart Reviewed: Yes  Additional Pertinent Hx: History of present Illness: Left Upper Extremity Lymphedema, Stage 2 - Patient was diagnosed with breast cancer 2008 and had a double masectomy in  with a breast reconstruction in . 6 months later she started noticing swelling. She started therapy in  in [facility]. She recived Manual Lymph drainage and short stretch bandage. In  she moved to [facility]  received treatment at [facility] . She underwent Lymphaticovenous Bypass on  and has been wrapping her arm at least 3x a week. She still has notable swelling but she reports she felt instant relief after surgery. She reports surgeon stated it can take 3-4 months to lose more swelling. Family / Caregiver Present: No  Referring Practitioner: Jeannine Miller  PT Visit Information  PT Insurance Information: Medicare/ BCBS  Total # of Visits Approved: 18  Total # of Visits to Date: 15  Progress Note Due Date: 21  Subjective  Subjective: Patient reports that she has been working on MLD. Pain Screening  Patient Currently in Pain: No  Vital Signs  Patient Currently in Pain: No       Treatment Activities:    Exercises  Exercise 1: MLD opened AAA and left IA with full arm tretament  NO short neck portion of treatment  Exercise 2: Self MLD: educated on all steps of Left arm self MLD - training on hand placement and stretching of skin with use of spatula   not today  Exercise 3: Compression Wrapping: fabricated hand reduction kit  Exercise 4: Skin Care yes  Exercise 5: Exercises:  shoulder flexion, abduction, elbow flexion and extension, fists  x 10  Arm bike level 3 5 minutes  Exercise 6: Measurements: no and Ldex score no     Assessment:   Conditions Requiring Skilled Therapeutic Intervention  Assessment: Full CDT performed today.   The arm wounds appears to be healing at this time. Encouraged patient to tighten reduction kit every two hour and perform MLD twice daily. Prognosis: Good  REQUIRES PT FOLLOW UP: Yes (3 month followup)  Treatment Initiated : MLD, exercises, education  Discharge Recommendations: 3-5 sessions per week    Goals:  Short term goals  Time Frame for Short term goals: 1-2 weeks  Short term goal 1: Patient will be indep with HEP met (5/4: performs daily)  Short term goal 2: Patient will be indep with self MLD met (5/4: performs daily)  Short term goal 3: Patient will decrease 2-5 cm in each placements of the left UE progress (5/4: over the last 2 weeks patient has had decreased circumference measurments but numbers have increased this week)  Long term goals  Time Frame for Long term goals : 4-8 weeks  Long term goal 1: Patient to be indep to don and doff compression garments properly without undue skin friction to reduce risk for skin breakdown, and be Indep with wearing schedule. progress (5/4: still in decongestive phase)  Long term goal 2: Patient Lymphedema Life Impact Scale Impairment score will decrease by 20% progress (5/4: decreased by 17%)  Long term goal 3: Patient L-Dex score will decrease by 10 points progress (5/4: the week of 4/27 her L-dex score decreased greater than 10% but it has increased greater than her evaluation score this week)  Long term goal 4: PT will recommend proper maintenance garment for daytime wear.  progress (5/4: still in decongestive phase)  Patient Goals   Patient goals : I want to decrease arm size to prepare for surgery  Plan:    Plan  Times per week: 2-3  Plan weeks: 4-8  Current Treatment Recommendations: Strengthening, Manual Lymphatic Drainage, ROM, Home Exercise Program, IADL Training  Timed Code Treatment Minutes: 87 Minutes     Therapy Time   Individual Concurrent Group Co-treatment   Time In 5238         Time Out 7871         Minutes 87         Timed Code Treatment Minutes: 87 Minutes  Electronically signed by Sol Garza, PTA on 7/13/2021 at 3:31 PM

## 2021-07-16 ENCOUNTER — HOSPITAL ENCOUNTER (OUTPATIENT)
Dept: PHYSICAL THERAPY | Age: 66
Setting detail: THERAPIES SERIES
Discharge: HOME | End: 2021-07-16
Payer: MEDICARE

## 2021-07-16 PROCEDURE — 97110 THERAPEUTIC EXERCISES: CPT

## 2021-07-16 PROCEDURE — 97140 MANUAL THERAPY 1/> REGIONS: CPT

## 2021-07-16 NOTE — PROGRESS NOTES
previous visit. Measurements show this to be true. Most measurements taken today were on average 2 cm larger than inital evaluation on April 6th. Encouraged patient to tighten reduction kit every two hour and perform MLD twice daily. Patient traveling out of town this weekend. Encouarged her to keep arm elevated during drive and perform some type of exericses every 30 minutes and continue to tighten reduction kit. Prognosis: Good  REQUIRES PT FOLLOW UP: Yes (3 month followup)  Treatment Initiated : MLD, exercises, education  Discharge Recommendations: 3-5 sessions per week    Goals:  Short term goals  Time Frame for Short term goals: 1-2 weeks  Short term goal 1: Patient will be indep with HEP met (5/4: performs daily)  Short term goal 2: Patient will be indep with self MLD met (5/4: performs daily)  Short term goal 3: Patient will decrease 2-5 cm in each placements of the left UE progress (5/4: over the last 2 weeks patient has had decreased circumference measurments but numbers have increased this week)  Long term goals  Time Frame for Long term goals : 4-8 weeks  Long term goal 1: Patient to be indep to don and doff compression garments properly without undue skin friction to reduce risk for skin breakdown, and be Indep with wearing schedule. progress (5/4: still in decongestive phase)  Long term goal 2: Patient Lymphedema Life Impact Scale Impairment score will decrease by 20% progress (5/4: decreased by 17%)  Long term goal 3: Patient L-Dex score will decrease by 10 points progress (5/4: the week of 4/27 her L-dex score decreased greater than 10% but it has increased greater than her evaluation score this week)  Long term goal 4: PT will recommend proper maintenance garment for daytime wear.  progress (5/4: still in decongestive phase)  Patient Goals   Patient goals : I want to decrease arm size to prepare for surgery  Plan:    Plan  Times per week: 2-3  Plan weeks: 4-8  Current Treatment Recommendations: Strengthening, Manual Lymphatic Drainage, ROM, Home Exercise Program, IADL Training  Timed Code Treatment Minutes: 70 Minutes     Therapy Time   Individual Concurrent Group Co-treatment   Time In 5906         Time Out 1105         Minutes 70         Timed Code Treatment Minutes: 70 Minutes  Electronically signed by Sridevi Mcclelland PTA on 7/16/2021 at 11:17 AM

## 2021-07-21 ENCOUNTER — HOSPITAL ENCOUNTER (OUTPATIENT)
Dept: PHYSICAL THERAPY | Age: 66
Setting detail: THERAPIES SERIES
Discharge: HOME | End: 2021-07-21
Payer: MEDICARE

## 2021-07-21 PROCEDURE — 97530 THERAPEUTIC ACTIVITIES: CPT

## 2021-07-21 PROCEDURE — 97110 THERAPEUTIC EXERCISES: CPT

## 2021-07-21 NOTE — PROGRESS NOTES
Physical Therapy  Daily Treatment Note/Reassessment  Date: 2021  Patient Name: Chio Hallman  MRN: [id number]     :   1955    Subjective:   General  Chart Reviewed: Yes  Additional Pertinent Hx: History of present Illness: Left Upper Extremity Lymphedema, Stage 2 - Patient was diagnosed with breast cancer 2008 and had a double masectomy in  with a breast reconstruction in . 6 months later she started noticing swelling. She started therapy in  in [facility]. She recived Manual Lymph drainage and short stretch bandage. In  she moved to [facility]  received treatment at [facility] . She underwent Lymphaticovenous Bypass on  and has been wrapping her arm at least 3x a week. She still has notable swelling but she reports she felt instant relief after surgery. She reports surgeon stated it can take 3-4 months to lose more swelling. Response To Previous Treatment: Patient with no complaints from previous session. Family / Caregiver Present: No  Referring Practitioner: Mali Bardales  PT Visit Information  PT Insurance Information: Medicare/ BCBS  Total # of Visits Approved: 18  Total # of Visits to Date: 17  Subjective  Subjective: Patient reports she has noticed the swelling in her hand has decreased since starting with the compression wrapping versus reduction kit.   Pain Screening  Patient Currently in Pain: No  Vital Signs  Patient Currently in Pain: No       Treatment Activities:                                    Exercises  Exercise 1: MLD opened ANAYA axillary nodes, AAA and left IA with full arm tretament  NO short neck portion of treatment  Exercise 2: Self MLD: educated on all steps of Left arm self MLD - training on hand placement and stretching of skin with use of spatula   not today  Exercise 3: Compression Wrapping: fabricated hand reduction kit  Exercise 4: Skin Care yes  Exercise 5: Exercises:  shoulder flexion, abduction, elbow flexion and extension, fists  x 10 no  Exercise 6: Measurements: yes  and Ldex score yes  Exercise 8: Measured for long term garments on 7/21                               Assessment:   Conditions Requiring Skilled Therapeutic Intervention  Assessment: Measurements taken today with significantly decreased L Dex score and decrease in circumferential measurements as well. Patient is improving and has benefited more from short stretch bandage wrapping versus reduction kit. Measured today for long term garment with patient getting arm sleeve from styloid process to axilla as well as hand gauntlet. Patient will return for garment fitting. Prognosis: Good  REQUIRES PT FOLLOW UP: Yes (3 month followup)  Treatment Initiated : Long term garment measurements  Discharge Recommendations: 3-5 sessions per week    Goals:  Short term goals  Time Frame for Short term goals: 1-2 weeks  Short term goal 1: Patient will be indep with HEP met (5/4: performs daily)  Short term goal 2: Patient will be indep with self MLD met (5/4: performs daily)  Short term goal 3: Patient will decrease 2-5 cm in each placements of the left UE progress (5/4: over the last 2 weeks patient has had decreased circumference measurments but numbers have increased this week)  Long term goals  Time Frame for Long term goals : 4-8 weeks  Long term goal 1: Patient to be indep to don and doff compression garments properly without undue skin friction to reduce risk for skin breakdown, and be Indep with wearing schedule.  progress (5/4: still in decongestive phase)  Long term goal 2: Patient Lymphedema Life Impact Scale Impairment score will decrease by 20% progress    not yet (5/4: decreased by 17%)  Long term goal 3: Patient L-Dex score will decrease by 10 points progress  met (5/4: the week of 4/27 her L-dex score decreased greater than 10% but it has increased greater than her evaluation score this week 7/21 decreased by 30 points)  Long term goal 4: PT will recommend proper maintenance garment for daytime wear.  met (7/21 garment ordered today)  Patient Goals   Patient goals : I want to decrease arm size to prepare for surgery  Plan:    Plan  Times per week: 2-3  Plan weeks: 4-8  Current Treatment Recommendations: Strengthening, Manual Lymphatic Drainage, ROM, Home Exercise Program, IADL Training  Timed Code Treatment Minutes: 65 Minutes     Therapy Time   Individual Concurrent Group Co-treatment   Time In 0900         Time Out 1005         Minutes 65         Timed Code Treatment Minutes: 65 Minutes  Electronically signed by Dominik Toro PT on 7/21/2021 at 12:29 PM

## 2021-07-30 ENCOUNTER — APPOINTMENT (OUTPATIENT)
Dept: PHYSICAL THERAPY | Age: 66
End: 2021-07-30
Payer: MEDICARE

## 2021-08-05 ENCOUNTER — HOSPITAL ENCOUNTER (OUTPATIENT)
Dept: PHYSICAL THERAPY | Age: 66
Setting detail: THERAPIES SERIES
Discharge: HOME | End: 2021-08-05
Payer: MEDICARE

## 2021-08-05 PROCEDURE — 97140 MANUAL THERAPY 1/> REGIONS: CPT

## 2021-08-05 PROCEDURE — 97110 THERAPEUTIC EXERCISES: CPT

## 2021-08-05 NOTE — PROGRESS NOTES
Physical Therapy  Daily Treatment Note  Date: 2021  Patient Name: Liana Osullivan  MRN: [id number]     :   1955    Subjective:   General  Chart Reviewed: Yes  Additional Pertinent Hx: History of present Illness: Left Upper Extremity Lymphedema, Stage 2 - Patient was diagnosed with breast cancer 2008 and had a double masectomy in  with a breast reconstruction in . 6 months later she started noticing swelling. She started therapy in  in [facility]. She recived Manual Lymph drainage and short stretch bandage. In  she moved to [facility]  received treatment at [facility] . She underwent Lymphaticovenous Bypass on  and has been wrapping her arm at least 3x a week. She still has notable swelling but she reports she felt instant relief after surgery. She reports surgeon stated it can take 3-4 months to lose more swelling. Response To Previous Treatment: Patient with no complaints from previous session. Family / Caregiver Present: No  Referring Practitioner: Reji Wilkerson  PT Visit Information  PT Insurance Information: Medicare/ BCBS  Total # of Visits Approved: 18  Total # of Visits to Date: 25  Progress Note Due Date: 21  Subjective  Subjective: That reduction kit is just not staying on. That velcor catches on everything and comes loose. Pain Screening  Patient Currently in Pain: No  Vital Signs  Patient Currently in Pain: No       Treatment Activities:    Exercises  Exercise 1: MLD opened ANAYA axillary nodes, AAA with full arm tretament  NO short neck portion of treatment  Exercise 9: Application of long term garment     Assessment:   Conditions Requiring Skilled Therapeutic Intervention  Assessment: Garment arrvied. Attempt was made both by patient and therapist to apply and was unable to due increased edema in left forearm. Discussed with patient the importance of continuing self MLD at home. Instructed to perform opening of AAA and AI before using pump.   Encouraged wrapping at time to keep constant pressure on arm. Discussed using something to cover reduction kit to keep velroc from coming loose at night and during the day. Therapist then opened shonda axillary lyphm nodes and AAA before performing full arm treatment to left arm. Another attempt was made to get garment on. Therapist and patient were able to get garmet on after MDL. Patient stated that it was very tight however not uncomfortable. Instructed patient to remove garment and check skin regularly. She will return for one more visit to check fit. Prognosis: Good  REQUIRES PT FOLLOW UP: Yes (3 month followup)  Treatment Initiated : Long term garment measurements  Discharge Recommendations: Continue to assess pending progress    Goals:  Short term goals  Time Frame for Short term goals: 1-2 weeks  Short term goal 1: Patient will be indep with HEP met (5/4: performs daily)  Short term goal 2: Patient will be indep with self MLD met (5/4: performs daily)  Short term goal 3: Patient will decrease 2-5 cm in each placements of the left UE progress (5/4: over the last 2 weeks patient has had decreased circumference measurments but numbers have increased this week)  Long term goals  Time Frame for Long term goals : 4-8 weeks  Long term goal 1: Patient to be indep to don and doff compression garments properly without undue skin friction to reduce risk for skin breakdown, and be Indep with wearing schedule. progress (5/4: still in decongestive phase)  Long term goal 2: Patient Lymphedema Life Impact Scale Impairment score will decrease by 20% progress    not yet (5/4: decreased by 17%)  Long term goal 3: Patient L-Dex score will decrease by 10 points progress  met (5/4: the week of 4/27 her L-dex score decreased greater than 10% but it has increased greater than her evaluation score this week 7/21 decreased by 30 points)  Long term goal 4: PT will recommend proper maintenance garment for daytime wear.  met (7/21 garment ordered today)  Patient Goals   Patient goals : I want to decrease arm size to prepare for surgery  Plan:    Plan  Current Treatment Recommendations: Manual Lymphatic Drainage, Manual Therapy - Soft Tissue Mobilization  Plan Comment: one more visit to check fit of garment.   Timed Code Treatment Minutes: 47 Minutes     Therapy Time   Individual Concurrent Group Co-treatment   Time In 7954         Time Out 8330         Minutes 54         Timed Code Treatment Minutes: 47 Minutes  Electronically signed by Mallory Grimm PTA on 8/5/2021 at 3:37 PM

## 2021-08-10 ENCOUNTER — HOSPITAL ENCOUNTER (OUTPATIENT)
Dept: PHYSICAL THERAPY | Age: 66
Setting detail: THERAPIES SERIES
Discharge: HOME | End: 2021-08-10
Payer: MEDICARE

## 2021-08-10 PROCEDURE — 97110 THERAPEUTIC EXERCISES: CPT

## 2021-08-20 NOTE — PROGRESS NOTES
[facility]  OUTPATIENT PHYSICAL THERAPY  DISCHARGE SUMMARY    Date: 2021  Patient Name: Johanna Beck        MRN: [id number]    ACCOUNT #: [de-identified]  : 1955  (61 y.o.)  Gender: female   Referring Practitioner: Brynn Gasca  Diagnosis: Personal history of breast cancer; s/p bilateral masectomy  Referral Date : 21       Total # of Visits Approved: 19  Total # of Visits to Date: 19    Subjective  Subjective: I have gone back to wraping because that reductin kit just won't stay on. Additional Pertinent Hx: History of present Illness: Left Upper Extremity Lymphedema, Stage 2 - Patient was diagnosed with breast cancer 2008 and had a double masectomy in  with a breast reconstruction in . 6 months later she started noticing swelling. She started therapy in  in [facility]. She recived Manual Lymph drainage and short stretch bandage. In  she moved to [facility]  received treatment at [facility] . She underwent Lymphaticovenous Bypass on  and has been wrapping her arm at least 3x a week. She still has notable swelling but she reports she felt instant relief after surgery. She reports surgeon stated it can take 3-4 months to lose more swelling. Objective  Treatments received include: complete decongestive therapy including skin care, manual lymph drainage, compression, and therapeutic exercise, long term garment fitting and education, lymphedema education  See objective/subjective data in goals    Assessment  Assessment: Patient entered clinic with left arm wrapped in short stretch bandages. She states that reduction kit is just not staying on and staying tight. Patient brought her sleeve doning aid with her today. She had complaint of gauntlet not wanting to stay on wrist.  Advised patient to place gauntlet under sleeve to prevent it from rolling down. Measurements were taken today as this is her last visit.   Therapist stressed the importance of continuing using pump and wraping at night. Plan     Patient is discharged to home with HEP and long term compression garment. Goals  Short term goals  Time Frame for Short term goals: 1-2 weeks  Short term goal 1: Patient will be indep with HEP met (5/4: performs daily)  Short term goal 2: Patient will be indep with self MLD met (5/4: performs daily)  Short term goal 3: Patient will decrease 2-5 cm in each placements of the left UE progress (5/4: over the last 2 weeks patient has had decreased circumference measurments but numbers have increased this week)    Long term goals  Time Frame for Long term goals : 4-8 weeks  Long term goal 1: Patient to be indep to don and doff compression garments properly without undue skin friction to reduce risk for skin breakdown, and be Indep with wearing schedule. MET (5/4: still in decongestive phase  08/10 pateint shows good understanding of don/doff without undue skin friction. Verbalized good understanding to watch for skin breakdown. She states she is wearing garment daily)  Long term goal 2: Patient Lymphedema Life Impact Scale Impairment score will decrease by 20% progress    MET (5/4: decreased by 17%  08/10 24% impairment)  Long term goal 3: Patient L-Dex score will decrease by 10 points progress  met (5/4: the week of 4/27 her L-dex score decreased greater than 10% but it has increased greater than her evaluation score this week 7/21 decreased by 30 points)  Long term goal 4: PT will recommend proper maintenance garment for daytime wear.  met (7/21 garment ordered today)         Electronically signed by Tam Tafoya PT on 8/20/2021 at 1:27 PM

## 2021-09-14 RX ORDER — FLUCONAZOLE 150 MG/1
150 TABLET ORAL
Qty: 2 TABLET | Refills: 0 | Status: SHIPPED | OUTPATIENT
Start: 2021-09-14 | End: 2021-09-20

## 2021-10-04 ENCOUNTER — OFFICE VISIT (OUTPATIENT)
Dept: OBGYN CLINIC | Age: 66
End: 2021-10-04
Payer: MEDICARE

## 2021-10-04 VITALS
HEART RATE: 57 BPM | DIASTOLIC BLOOD PRESSURE: 74 MMHG | WEIGHT: 262 LBS | HEIGHT: 64 IN | SYSTOLIC BLOOD PRESSURE: 132 MMHG | BODY MASS INDEX: 44.73 KG/M2

## 2021-10-04 DIAGNOSIS — N95.2 ATROPHIC VAGINITIS: Primary | ICD-10-CM

## 2021-10-04 DIAGNOSIS — N89.8 VAGINAL ITCHING: ICD-10-CM

## 2021-10-04 PROCEDURE — 3017F COLORECTAL CA SCREEN DOC REV: CPT | Performed by: NURSE PRACTITIONER

## 2021-10-04 PROCEDURE — G8427 DOCREV CUR MEDS BY ELIG CLIN: HCPCS | Performed by: NURSE PRACTITIONER

## 2021-10-04 PROCEDURE — 1036F TOBACCO NON-USER: CPT | Performed by: NURSE PRACTITIONER

## 2021-10-04 PROCEDURE — 99213 OFFICE O/P EST LOW 20 MIN: CPT | Performed by: NURSE PRACTITIONER

## 2021-10-04 PROCEDURE — G8417 CALC BMI ABV UP PARAM F/U: HCPCS | Performed by: NURSE PRACTITIONER

## 2021-10-04 PROCEDURE — G8484 FLU IMMUNIZE NO ADMIN: HCPCS | Performed by: NURSE PRACTITIONER

## 2021-10-04 ASSESSMENT — ENCOUNTER SYMPTOMS
EYES NEGATIVE: 1
DIARRHEA: 0
CONSTIPATION: 0
GASTROINTESTINAL NEGATIVE: 1
ALLERGIC/IMMUNOLOGIC NEGATIVE: 1
RESPIRATORY NEGATIVE: 1

## 2021-10-04 NOTE — PROGRESS NOTES
Pt states she has vaginal itching and the diflucan did not help. She states the itching has been going on for a while and nothing is helping. She used OTC medication and topicals and is dry.

## 2021-10-04 NOTE — PROGRESS NOTES
Courtney Loya is a 61 y.o. female who presents today for her medical conditions/ complaints as noted below. Courtney Loya is c/o of Vaginal Itching        HPI  Pt presents c/o vulvar itching. Was treated for yeast infection with Diflucan without any relief. Having vaginal dryness and pain with sex. No new partners. Has used steroid cream in the past and this has helped, but not helping this time, is actually making it worse. History of breast cancer, would like to avoid hormones. No LMP recorded. Patient has had a hysterectomy. No obstetric history on file.     Past Medical History:   Diagnosis Date    Acid reflux     Breast cancer ([facility] [address] 2008    Dysplasia of cervix     Hyperlipidemia     Hypertension     Thyroid disease      Past Surgical History:   Procedure Laterality Date    BREAST RECONSTRUCTION  2010    BREAST RECONSTRUCTION  2011    BREAST RECONSTRUCTION  2011    BREAST SURGERY Right     implant changed    CHOLECYSTECTOMY  2006    COLONOSCOPY  12/2011    Pt will see Dr. Kamar Beasley 2017   [address] [address]   [address] with retained ovaries    MASTECTOMY, BILATERAL  2008    OTHER SURGICAL HISTORY  2006    Sphincter ODODDI    UT COLONOSCOPY FLX DX W/COLLJ SPEC WHEN PFRMD N/A 04/26/2017    Dr Mart-diverticulosis, 5 yr recall    UT EGD TRANSORAL BIOPSY SINGLE/MULTIPLE N/A 04/11/2018    Dr Kamar Beasley (+) Reza's (-) dysplasia-Zuri (-)--1 yr recall    UPPER GASTROINTESTINAL ENDOSCOPY N/A 05/30/2019    Dr Elba Beatty-Gastropathy, (+) Reza's, (-) dysplasia--1 yr recall     Family History   Problem Relation Age of Onset    Cancer Father 36        Lymphoma    Stroke Maternal Grandfather     Colon Cancer Neg Hx     Colon Polyps Neg Hx     Liver Cancer Neg Hx     Liver Disease Neg Hx     Esophageal Cancer Neg Hx     Rectal Cancer Neg Hx     Stomach Cancer Neg Hx      Social History     Tobacco Use    Smoking status: Never Smoker    Smokeless tobacco: Never Used Substance Use Topics    Alcohol use: Yes     Comment: rarely       Current Outpatient Medications   Medication Sig Dispense Refill    escitalopram (LEXAPRO) 10 MG tablet Take 10 mg by mouth daily      polycarbophil (FIBERCON) 625 MG tablet Take 2 tablets by mouth every evening      levothyroxine (SYNTHROID) 50 MCG tablet Take 50 mcg by mouth Daily      omeprazole (PRILOSEC) 20 MG delayed release capsule Take 20 mg by mouth daily      bisoprolol (ZEBETA) 5 MG tablet 10 mg daily       ezetimibe (ZETIA) 10 MG tablet Take 10 mg by mouth daily      Cholecalciferol (VITAMIN D3) 2000 UNITS CAPS Take by mouth      aspirin 81 MG tablet Take 81 mg by mouth daily       No current facility-administered medications for this visit. Allergies   Allergen Reactions    Neosporin [Neomycin-Polymyxin-Gramicidin]     Penicillins     Sulfa Antibiotics     Zithromax [Azithromycin]      Vitals:    10/04/21 1455   BP: 132/74   Pulse: 57     Body mass index is 44.97 kg/m². Review of Systems   Constitutional: Negative. HENT: Negative. Eyes: Negative. Respiratory: Negative. Cardiovascular: Negative. Gastrointestinal: Negative. Negative for constipation and diarrhea. Endocrine: Negative. Genitourinary: Positive for dyspareunia and vaginal pain. Negative for frequency, menstrual problem and urgency. Musculoskeletal: Negative. Skin: Negative. Allergic/Immunologic: Negative. Neurological: Negative. Hematological: Negative. Psychiatric/Behavioral: Negative. All other systems reviewed and are negative. Physical Exam  Vitals and nursing note reviewed. Constitutional:       Appearance: She is well-developed. HENT:      Head: Normocephalic. Right Ear: External ear normal.      Left Ear: External ear normal.      Nose: Nose normal.   Genitourinary:     Vagina: Vaginal discharge (thin white), erythema and tenderness present.       Comments: Erythema and atrophy is labial folds and perineum  Labial fusion and clitoral guaman fusion  No AWE lesions, no white patches  Propath collected    Musculoskeletal:         General: Normal range of motion. Cervical back: Normal range of motion. Skin:     General: Skin is warm and dry. Neurological:      Mental Status: She is alert and oriented to person, place, and time. Psychiatric:         Attention and Perception: Attention normal.         Mood and Affect: Mood normal.         Speech: Speech normal.         Behavior: Behavior normal.         Thought Content: Thought content normal.         Cognition and Memory: Cognition normal.         Judgment: Judgment normal.          Diagnosis Orders   1. Atrophic vaginitis     2. Vaginal itching         MEDICATIONS:  No orders of the defined types were placed in this encounter. ORDERS:  No orders of the defined types were placed in this encounter. PLAN:  Propath pending  Calling in non hormonal compounds to [facility]- Oxytocin and Vitamin E oil  Instructed to use Desitin mixed with steroid topical for external use  F/u if no improvement and may need biopsy- pt states understanding    Patient Instructions     Patient Education        Atrophic Vaginitis: Care Instructions  Your Care Instructions     Atrophic vaginitis is an irritation of the vagina. It's caused by thinning tissues and less moisture in the vaginal walls. It often happens during menopause when hormone levels change. Surgery to remove the ovaries also can cause it. Your doctor may do tests to rule out other causes. And you may get tests to measure your hormone levels. The problem is most often treated with the hormone estrogen. It comes in a cream, tablets, or a soft plastic ring that is placed in the vagina. Follow-up care is a key part of your treatment and safety. Be sure to make and go to all appointments, and call your doctor if you are having problems.  It's also a good idea to know your test results and keep a list of the medicines you take. How can you care for yourself at home? · Use a water-based lubricant for your vagina if sex is dry or painful. Examples are Astroglide, Wet Lubricant Gel, and K-Y Jelly. · Talk with your doctor about using low-dose vaginal estrogen. It treats dryness and thinning tissue. · Do not douche. · Having sex improves blood flow to the vagina. This helps keep your tissue healthy. When should you call for help? Watch closely for changes in your health, and be sure to contact your doctor if:    · You have unexpected vaginal bleeding.     · You do not get better as expected. Where can you learn more? Go to https://2NDNATUREpeBiTaksieb.healthPhunware. org and sign in to your Zecco account. Enter R330 in the Controlus box to learn more about \"Atrophic Vaginitis: Care Instructions. \"     If you do not have an account, please click on the \"Sign Up Now\" link. Current as of: February 11, 2021               Content Version: 13.0  © 2006-2021 Healthwise, Incorporated. Care instructions adapted under license by [facility] ([facility]). If you have questions about a medical condition or this instruction, always ask your healthcare professional. [address] any warranty or liability for your use of this information.

## 2021-12-03 ENCOUNTER — OFFICE VISIT (OUTPATIENT)
Dept: OBGYN CLINIC | Age: 66
End: 2021-12-03
Payer: MEDICARE

## 2021-12-03 VITALS
BODY MASS INDEX: 44.73 KG/M2 | HEIGHT: 64 IN | DIASTOLIC BLOOD PRESSURE: 78 MMHG | SYSTOLIC BLOOD PRESSURE: 137 MMHG | WEIGHT: 262 LBS | HEART RATE: 61 BPM

## 2021-12-03 DIAGNOSIS — N39.3 STRESS INCONTINENCE: ICD-10-CM

## 2021-12-03 DIAGNOSIS — L29.2 VULVAR ITCHING: ICD-10-CM

## 2021-12-03 DIAGNOSIS — Z01.419 ENCOUNTER FOR ROUTINE GYNECOLOGIC EXAMINATION IN MEDICARE PATIENT: Primary | ICD-10-CM

## 2021-12-03 PROCEDURE — G0101 CA SCREEN;PELVIC/BREAST EXAM: HCPCS | Performed by: NURSE PRACTITIONER

## 2021-12-03 PROCEDURE — 1036F TOBACCO NON-USER: CPT | Performed by: NURSE PRACTITIONER

## 2021-12-03 PROCEDURE — 3017F COLORECTAL CA SCREEN DOC REV: CPT | Performed by: NURSE PRACTITIONER

## 2021-12-03 PROCEDURE — G8484 FLU IMMUNIZE NO ADMIN: HCPCS | Performed by: NURSE PRACTITIONER

## 2021-12-03 PROCEDURE — G8417 CALC BMI ABV UP PARAM F/U: HCPCS | Performed by: NURSE PRACTITIONER

## 2021-12-03 PROCEDURE — G8427 DOCREV CUR MEDS BY ELIG CLIN: HCPCS | Performed by: NURSE PRACTITIONER

## 2021-12-03 RX ORDER — CLOBETASOL PROPIONATE 0.5 MG/G
OINTMENT TOPICAL
Qty: 60 G | Refills: 2 | Status: SHIPPED | OUTPATIENT
Start: 2021-12-03 | End: 2022-09-28

## 2021-12-03 ASSESSMENT — ENCOUNTER SYMPTOMS
RESPIRATORY NEGATIVE: 1
GASTROINTESTINAL NEGATIVE: 1
EYES NEGATIVE: 1

## 2021-12-03 NOTE — PATIENT INSTRUCTIONS
feel your breast tissue before moving on to the next spot. ? Check your entire breast, moving up and down as if following a strip from the collarbone to the bra line, and from the armpit to the ribs. Repeat until you have covered the entire breast.  ? Repeat this procedure for your left breast, using the pads of the 3 middle fingers of your right hand. · To examine your breasts while in the shower:  ? Place one arm over your head and lightly soap your breast on that side. ? Using the pads of your fingers, gently move your hand over your breast (in the strip pattern described above), feeling carefully for any lumps or changes. ? Repeat for the other breast.  · Have your doctor inspect anything you notice to see if you need further testing. Where can you learn more? Go to https://chtomaseb.Chartbeat. org and sign in to your OpenEd account. Enter P148 in the Utility Scale Solar box to learn more about \"Breast Self-Exam: Care Instructions. \"     If you do not have an account, please click on the \"Sign Up Now\" link. Current as of: December 17, 2020               Content Version: 13.0  © 2006-2021 1SDK. Care instructions adapted under license by [facility] ([facility]). If you have questions about a medical condition or this instruction, always ask your healthcare professional. [place] [address] any warranty or liability for your use of this information. Patient Education        Well Visit, Ages 25 to 48: Care Instructions  Overview     Well visits can help you stay healthy. Your doctor has checked your overall health and may have suggested ways to take good care of yourself. Your doctor also may have recommended tests. At home, you can help prevent illness with healthy eating, regular exercise, and other steps. Follow-up care is a key part of your treatment and safety. Be sure to make and go to all appointments, and call your doctor if you are having problems.  It's also a good idea to know your test results and keep a list of the medicines you take. How can you care for yourself at home? · Get screening tests that you and your doctor decide on. Screening helps find diseases before any symptoms appear. · Eat healthy foods. Choose fruits, vegetables, whole grains, protein, and low-fat dairy foods. Limit fat, especially saturated fat. Reduce salt in your diet. · Limit alcohol. If you are a man, have no more than 2 drinks a day or 14 drinks a week. If you are a woman, have no more than 1 drink a day or 7 drinks a week. · Get at least 30 minutes of physical activity on most days of the week. Walking is a good choice. You also may want to do other activities, such as running, swimming, cycling, or playing tennis or team sports. Discuss any changes in your exercise program with your doctor. · Reach and stay at a healthy weight. This will lower your risk for many problems, such as obesity, diabetes, heart disease, and high blood pressure. · Do not smoke or allow others to smoke around you. If you need help quitting, talk to your doctor about stop-smoking programs and medicines. These can increase your chances of quitting for good. · Care for your mental health. It is easy to get weighed down by worry and stress. Learn strategies to manage stress, like deep breathing and mindfulness, and stay connected with your family and community. If you find you often feel sad or hopeless, talk with your doctor. Treatment can help. · Talk to your doctor about whether you have any risk factors for sexually transmitted infections (STIs). You can help prevent STIs if you wait to have sex with a new partner (or partners) until you've each been tested for STIs. It also helps if you use condoms (male or female condoms) and if you limit your sex partners to one person who only has sex with you. Vaccines are available for some STIs, such as HPV.   · Use birth control if it's important to you to prevent pregnancy. Talk with your doctor about the choices available and what might be best for you. · If you think you may have a problem with alcohol or drug use, talk to your doctor. This includes prescription medicines (such as amphetamines and opioids) and illegal drugs (such as cocaine and methamphetamine). Your doctor can help you figure out what type of treatment is best for you. · Protect your skin from too much sun. When you're outdoors from 10 a.m. to 4 p.m., stay in the shade or cover up with clothing and a hat with a wide brim. Wear sunglasses that block UV rays. Even when it's cloudy, put broad-spectrum sunscreen (SPF 30 or higher) on any exposed skin. · See a dentist one or two times a year for checkups and to have your teeth cleaned. · Wear a seat belt in the car. When should you call for help? Watch closely for changes in your health, and be sure to contact your doctor if you have any problems or symptoms that concern you. Where can you learn more? Go to https://[email or web address].SoundCure. org and sign in to your Aniboom account. Enter P072 in the KylesKDPOF box to learn more about \"Well Visit, Ages 25 to 48: Care Instructions. \"     If you do not have an account, please click on the \"Sign Up Now\" link. Current as of: February 11, 2021               Content Version: 13.0  © 0102-7894 Healthwise, Incorporated. Care instructions adapted under license by [facility] ([facility]). If you have questions about a medical condition or this instruction, always ask your healthcare professional. [place] [address] any warranty or liability for your use of this information.

## 2021-12-03 NOTE — PROGRESS NOTES
Nory Cardenas is a 61 y.o. female who presents today for her medical conditions/ complaints as noted below. Nory Cardenas is c/o of Annual Exam        HPI  Pt presents today for pelvic exam.  She states since using compound cream, dryness has improved but still a problem at times  Itching to vulvar area. States does leak urine lynne with cough or sneeze. Had swab negative for yeast or bv in 10/2021    Last mammogram:  Double mastectomy  Last pap smear:  04/2019  Contraception:  hyst  Last bone density:  04/2019  Last colonoscopy: 2017  No LMP recorded. Patient has had a hysterectomy. No obstetric history on file.     Past Medical History:   Diagnosis Date    Acid reflux     Breast cancer ([address] 2008    Dysplasia of cervix     Hyperlipidemia     Hypertension     Thyroid disease      Past Surgical History:   Procedure Laterality Date    BREAST RECONSTRUCTION  2010    BREAST RECONSTRUCTION  2011    BREAST RECONSTRUCTION  2011    BREAST SURGERY Right     implant changed    CHOLECYSTECTOMY  2006    COLONOSCOPY  12/2011    Pt will see Dr. Malorie Mckee 2017   [address] with retained ovaries    MASTECTOMY, BILATERAL  2008    OTHER SURGICAL HISTORY  2006    Sphincter ODODDI    [place] COLONOSCOPY FLX DX W/COLLJ SPEC WHEN PFRMD N/A 04/26/2017    Dr Mart-diverticulosis, 5 yr recall    NV EGD TRANSORAL BIOPSY SINGLE/MULTIPLE N/A 04/11/2018    Dr Malorie Mckee (+) Reza's (-) dysplasia-Zuri (-)--1 yr recall    UPPER GASTROINTESTINAL ENDOSCOPY N/A 05/30/2019    Dr Lety Beatty-Gastropathy, (+) Reza's, (-) dysplasia--1 yr recall     Family History   Problem Relation Age of Onset    Cancer Father 36        Lymphoma    Stroke Maternal Grandfather     Colon Cancer Neg Hx     Colon Polyps Neg Hx     Liver Cancer Neg Hx     Liver Disease Neg Hx     Esophageal Cancer Neg Hx     Rectal Cancer Neg Hx     Stomach Cancer Neg Hx      Social History     Tobacco Use    Smoking status: Never Smoker    Smokeless tobacco: Never Used   Substance Use Topics    Alcohol use: Yes     Comment: rarely       Current Outpatient Medications   Medication Sig Dispense Refill    clobetasol (TEMOVATE) 0.05 % ointment Apply topically 2 times daily. 60 g 2    escitalopram (LEXAPRO) 10 MG tablet Take 10 mg by mouth daily      polycarbophil (FIBERCON) 625 MG tablet Take 2 tablets by mouth every evening      levothyroxine (SYNTHROID) 50 MCG tablet Take 50 mcg by mouth Daily      omeprazole (PRILOSEC) 20 MG delayed release capsule Take 20 mg by mouth daily      bisoprolol (ZEBETA) 5 MG tablet 10 mg daily       ezetimibe (ZETIA) 10 MG tablet Take 10 mg by mouth daily      Cholecalciferol (VITAMIN D3) 2000 UNITS CAPS Take by mouth      aspirin 81 MG tablet Take 81 mg by mouth daily       No current facility-administered medications for this visit. Allergies   Allergen Reactions    Neosporin [Neomycin-Polymyxin-Gramicidin]     Penicillins     Sulfa Antibiotics     Zithromax [Azithromycin]      Vitals:    12/03/21 1049   BP: 137/78   Pulse: 61     Body mass index is 44.97 kg/m². Review of Systems   Constitutional: Negative. HENT: Negative. Eyes: Negative. Respiratory: Negative. Cardiovascular: Negative. Gastrointestinal: Negative. Genitourinary: Negative for difficulty urinating, dyspareunia, dysuria, enuresis, frequency, hematuria, menstrual problem, pelvic pain, urgency and vaginal discharge. Vulvar itching     Musculoskeletal: Negative. Skin: Negative. Neurological: Negative. Psychiatric/Behavioral: Negative. Physical Exam  Vitals and nursing note reviewed. Constitutional:       General: She is not in acute distress. Appearance: She is well-developed. She is not diaphoretic. HENT:      Head: Normocephalic and atraumatic.       Right Ear: Hearing normal.      Left Ear: Hearing normal.      Nose: Nose normal.   Eyes:      General: Lids are normal.      Conjunctiva/sclera: Conjunctivae normal.      Pupils: Pupils are equal, round, and reactive to light. Cardiovascular:      Rate and Rhythm: Normal rate and regular rhythm. Heart sounds: No murmur heard. No friction rub. No gallop. Pulmonary:      Effort: Pulmonary effort is normal.      Breath sounds: Normal breath sounds. Chest:      Comments: Bilateral mastectomy, no breast exam done per pt requests  Abdominal:      General: Bowel sounds are normal. There is no distension. Palpations: Abdomen is soft. There is no mass. Tenderness: There is no abdominal tenderness. There is no guarding. Genitourinary:     Labia:         Right: No rash, tenderness or lesion. Left: No rash, tenderness or lesion. Vagina: No vaginal discharge or bleeding. Rectum: No anal fissure or external hemorrhoid. Comments: Labia are atrophic. Vaginal cuff intact. Redness/irritation to vulvar area and she has desitin to labia to protect skin from urine  Musculoskeletal:         General: Normal range of motion. Cervical back: Normal range of motion and neck supple. Comments: Normal ROM in all 4 extremities; normal gait   Lymphadenopathy:      Cervical: No cervical adenopathy. Skin:     General: Skin is warm and dry. Findings: No rash. Neurological:      Mental Status: She is alert and oriented to person, place, and time. Motor: No abnormal muscle tone. Coordination: Coordination normal.   Psychiatric:         Speech: Speech normal.         Behavior: Behavior normal.          Diagnosis Orders   1. Encounter for routine gynecologic examination in Medicare patient  DC CA SCREEN;PELVIC/BREAST EXAM   2. Vulvar itching  clobetasol (TEMOVATE) 0.05 % ointment   3. Stress incontinence         MEDICATIONS:  Orders Placed This Encounter   Medications    clobetasol (TEMOVATE) 0.05 % ointment     Sig: Apply topically 2 times daily.      Dispense:  60 g     Refill:  2 ORDERS:  Orders Placed This Encounter   Procedures    LA CA SCREEN;PELVIC/BREAST EXAM       PLAN:  Other than redness to vulva, normal exam  Will continue coconut oil for sex and the compounds for dryness  To restart clobetasol bid to vulvar area, then daily, then taper off after no itching noted. Consider Myrbetriq for incontinence and also yoga for pelvic floor strengthening  Patient Instructions   Patient Education        Breast Self-Exam: Care Instructions  Your Care Instructions     A breast self-exam is when you check your breasts for lumps or changes. This regular exam helps you learn how your breasts normally look and feel. Most breast problems or changes are not because of cancer. Breast self-exam is not a substitute for a mammogram. Having regular breast exams by your doctor and regular mammograms improve your chances of finding any problems with your breasts. Some women set a time each month to do a step-by-step breast self-exam. Other women like a less formal system. They might look at their breasts as they brush their teeth, or feel their breasts once in a while in the shower. If you notice a change in your breast, tell your doctor. Follow-up care is a key part of your treatment and safety. Be sure to make and go to all appointments, and call your doctor if you are having problems. It's also a good idea to know your test results and keep a list of the medicines you take. How do you do a breast self-exam?  · The best time to examine your breasts is usually one week after your menstrual period begins. Your breasts should not be tender then. If you do not have periods, you might do your exam on a day of the month that is easy to remember. · To examine your breasts:  ? Remove all your clothes above the waist and lie down. When you are lying down, your breast tissue spreads evenly over your chest wall, which makes it easier to feel all your breast tissue. ?  Use the pads--not the fingertips--of the 3 middle fingers of your left hand to check your right breast. Move your fingers slowly in small coin-sized circles that overlap. ? Use three levels of pressure to feel of all your breast tissue. Use light pressure to feel the tissue close to the skin surface. Use medium pressure to feel a little deeper. Use firm pressure to feel your tissue close to your breastbone and ribs. Use each pressure level to feel your breast tissue before moving on to the next spot. ? Check your entire breast, moving up and down as if following a strip from the collarbone to the bra line, and from the armpit to the ribs. Repeat until you have covered the entire breast.  ? Repeat this procedure for your left breast, using the pads of the 3 middle fingers of your right hand. · To examine your breasts while in the shower:  ? Place one arm over your head and lightly soap your breast on that side. ? Using the pads of your fingers, gently move your hand over your breast (in the strip pattern described above), feeling carefully for any lumps or changes. ? Repeat for the other breast.  · Have your doctor inspect anything you notice to see if you need further testing. Where can you learn more? Go to https://[employer].Bidgely. org and sign in to your "[employer]" account. Enter P148 in the [facility] box to learn more about \"Breast Self-Exam: Care Instructions. \"     If you do not have an account, please click on the \"Sign Up Now\" link. Current as of: December 17, 2020               Content Version: 13.0  © 2006-2021 Healthwise, Incorporated. Care instructions adapted under license by [facility] ([facility]). If you have questions about a medical condition or this instruction, always ask your healthcare professional. [place] [address] any warranty or liability for your use of this information.          Patient Education        Well Visit, Ages 25 to 48: Care Instructions  Overview     Well visits can help you stay healthy. Your doctor has checked your overall health and may have suggested ways to take good care of yourself. Your doctor also may have recommended tests. At home, you can help prevent illness with healthy eating, regular exercise, and other steps. Follow-up care is a key part of your treatment and safety. Be sure to make and go to all appointments, and call your doctor if you are having problems. It's also a good idea to know your test results and keep a list of the medicines you take. How can you care for yourself at home? · Get screening tests that you and your doctor decide on. Screening helps find diseases before any symptoms appear. · Eat healthy foods. Choose fruits, vegetables, whole grains, protein, and low-fat dairy foods. Limit fat, especially saturated fat. Reduce salt in your diet. · Limit alcohol. If you are a man, have no more than 2 drinks a day or 14 drinks a week. If you are a woman, have no more than 1 drink a day or 7 drinks a week. · Get at least 30 minutes of physical activity on most days of the week. Walking is a good choice. You also may want to do other activities, such as running, swimming, cycling, or playing tennis or team sports. Discuss any changes in your exercise program with your doctor. · Reach and stay at a healthy weight. This will lower your risk for many problems, such as obesity, diabetes, heart disease, and high blood pressure. · Do not smoke or allow others to smoke around you. If you need help quitting, talk to your doctor about stop-smoking programs and medicines. These can increase your chances of quitting for good. · Care for your mental health. It is easy to get weighed down by worry and stress. Learn strategies to manage stress, like deep breathing and mindfulness, and stay connected with your family and community. If you find you often feel sad or hopeless, talk with your doctor. Treatment can help.   · Talk to your doctor about whether you have any risk factors for sexually transmitted infections (STIs). You can help prevent STIs if you wait to have sex with a new partner (or partners) until you've each been tested for STIs. It also helps if you use condoms (male or female condoms) and if you limit your sex partners to one person who only has sex with you. Vaccines are available for some STIs, such as HPV. · Use birth control if it's important to you to prevent pregnancy. Talk with your doctor about the choices available and what might be best for you. · If you think you may have a problem with alcohol or drug use, talk to your doctor. This includes prescription medicines (such as amphetamines and opioids) and illegal drugs (such as cocaine and methamphetamine). Your doctor can help you figure out what type of treatment is best for you. · Protect your skin from too much sun. When you're outdoors from 10 a.m. to 4 p.m., stay in the shade or cover up with clothing and a hat with a wide brim. Wear sunglasses that block UV rays. Even when it's cloudy, put broad-spectrum sunscreen (SPF 30 or higher) on any exposed skin. · See a dentist one or two times a year for checkups and to have your teeth cleaned. · Wear a seat belt in the car. When should you call for help? Watch closely for changes in your health, and be sure to contact your doctor if you have any problems or symptoms that concern you. Where can you learn more? Go to https://[employer].health-partners. org and sign in to your [employer] account. Enter P072 in the [facility] box to learn more about \"Well Visit, Ages 25 to 48: Care Instructions. \"     If you do not have an account, please click on the \"Sign Up Now\" link. Current as of: February 11, 2021               Content Version: 13.0  © 6045-0356 Healthwise, Incorporated. Care instructions adapted under license by [facility] ([facility]).  If you have questions about a medical condition or this instruction, always ask your healthcare professional. [address] any warranty or liability for your use of this information.

## 2022-02-03 ENCOUNTER — TELEPHONE (OUTPATIENT)
Dept: HEMATOLOGY | Age: 67
End: 2022-02-03

## 2022-02-03 NOTE — TELEPHONE ENCOUNTER
Call to pt at [phone] with VM letting pt know the office will be closed 2/4 due to winter weather and that her appt will be changed to a phone visit. Email also sent to [email or web address] with above info.

## 2022-02-04 ENCOUNTER — HOSPITAL ENCOUNTER (OUTPATIENT)
Dept: INFUSION THERAPY | Age: 67
End: 2022-02-04

## 2022-02-20 NOTE — PROGRESS NOTES
MEDICAL ONCOLOGY PROGRESS NOTE    Pt Name: Karina Lawrence  MRN: [id number]  YOB: 1955  Date of evaluation: 2/25/2022      HISTORY OF PRESENT ILLNESS:    The patient presents today for a routine follow-up annual visit. She denies any new breast complaints. She had a bilateral mastectomy with reconstruction. She underwent lymphovenous bypass last year for her chronic left upper extremity lymphedema. Unfortunate, surgery did not work. She has persistent edema. She is seen regularly by her primary care provider. She is due for colonoscopy this year. Denies any other complaints. Diagnosis  · Stage II high-grade invasive ductal carcinoma of left breast, February 2008  · ER negative/NE negative, HER-2 positive  · Chronic lymphedema    Treatment summary  · 2008neoadjuvant chemotherapy with Carboplatin, Taxotere and Herceptin  · 07/23/2008bilateral mastectomy  · 2008adjuvant breast XRT  · 2010- implants and reconstruction  · 03/2021- Lymphedema Surgery (lymphovenous anastomosis)    Je Friedman was first seen by me on 4/2/2021. Patient has a personal history of stage II HER-2 positive/ER negative left breast invasive ductal carcinoma. She underwent neoadjuvant chemotherapy with Taxotere, carboplatin and Herceptin. She underwent a bilateral mastectomy 2008. Apparently had a complete pathologic response. 17 lymph nodes removed. She had implants placed in 2010. She has been struggling with chronic lymphedema. · 2/13/2008- Us-Guided Left Breast and Left Axillary Lymph Node Biopsy Invasive ductal carcinoma, high-grade. Lymph Node, Left Axilla, Biopsy: Ductal carcinoma, metastatic.    · 3/6/2008- Cancer Markers CA 15-3 = 12, CA 27.29 = 16  · 3/6/2008- Pet Ct Skull Base to Mid Thigh Abnormal PET CT imaging study documenting left axillary lymphadenopathy as was well and is 2 lesions in the upper-outer quadrant of the left breast  · 2008neoadjuvant chemotherapy with Carboplatin, Taxotere and Herceptin  · 7/8/2008- Mri Breast Bilateral No definitive evidence of malignancy   · 7/23/2008- Bilateral Mastectomy and Axillary Lymphadenectomy No residual invasive ductal carcinoma identified. 17 lymph nodes removed.    · 2008- adjuvant radiation therapy  · 2010- Breast Implants and Reconstruction  · 03/2021- Lymphedema Surgery (lymphovenous anastomosis)      Past Medical History:    Past Medical History:   Diagnosis Date    Acid reflux     Breast cancer ([address] 2008    Dysplasia of cervix     Hyperlipidemia     Hypertension     Thyroid disease        Past Surgical History:    Past Surgical History:   Procedure Laterality Date    BREAST RECONSTRUCTION  2010    BREAST RECONSTRUCTION  2011    BREAST RECONSTRUCTION  2011    BREAST SURGERY Right     implant changed    CHOLECYSTECTOMY  2006    COLONOSCOPY  12/2011    Pt will see Dr. Hiral Redmond 2017   [address] [facility]  [address] with retained ovaries    MASTECTOMY, BILATERAL  2008    OTHER SURGICAL HISTORY  2006    Sphincter ODODDI    [place] COLONOSCOPY FLX DX W/COLLJ SPEC WHEN PFRMD N/A 04/26/2017    Dr Mart-diverticulosis, 5 yr recall    [place] EGD TRANSORAL BIOPSY SINGLE/MULTIPLE N/A 04/11/2018    Dr Hiral Redmond (+) Reza's (-) dysplasia-Zuri (-)--1 yr recall    UPPER GASTROINTESTINAL ENDOSCOPY N/A 05/30/2019    Dr Wil Beatty-Gastropathy, (+) Reza's, (-) dysplasia--1 yr recall       Social History:    Marital status, children: , 2 children  Smoking status: Never smoker  ETOH status: One [facility] monthly  Resides: [place], [place]    Family History:   Family History   Problem Relation Age of Onset    Cancer Father 36        Lymphoma    Stroke Maternal Grandfather     Colon Cancer Neg Hx     Colon Polyps Neg Hx     Liver Cancer Neg Hx     Liver Disease Neg Hx     Esophageal Cancer Neg Hx     Rectal Cancer Neg Hx     Stomach Cancer Neg Hx        Current Hospital Medications:    Current Outpatient Medications Medication Sig Dispense Refill    clobetasol (TEMOVATE) 0.05 % ointment Apply topically 2 times daily. 60 g 2    escitalopram (LEXAPRO) 10 MG tablet Take 10 mg by mouth daily      polycarbophil (FIBERCON) 625 MG tablet Take 2 tablets by mouth every evening      levothyroxine (SYNTHROID) 50 MCG tablet Take 50 mcg by mouth Daily      omeprazole (PRILOSEC) 20 MG delayed release capsule Take 20 mg by mouth daily      ezetimibe (ZETIA) 10 MG tablet Take 10 mg by mouth daily      Cholecalciferol (VITAMIN D3) 2000 UNITS CAPS Take by mouth      bisoprolol (ZEBETA) 5 MG tablet 10 mg daily       aspirin 81 MG tablet Take 81 mg by mouth daily       No current facility-administered medications for this visit. Allergies:    Allergies   Allergen Reactions    Neosporin [Neomycin-Polymyxin-Gramicidin]     Penicillins     Sulfa Antibiotics     Zithromax [Azithromycin]          Subjective   REVIEW OF SYSTEMS:   CONSTITUTIONAL: no fever, no night sweats, no fatigue;  HEENT: no blurring of vision, no double vision, no hearing difficulty, no tinnitus, no ulceration, no dysplasia, no epistaxis;   LUNGS: no cough, no hemoptysis, no wheeze,  no shortness of breath;  CARDIOVASCULAR: no palpitation, no chest pain, no shortness of breath;  GI: no abdominal pain, no nausea, no vomiting, no diarrhea, no constipation;  JORJE: no dysuria, no hematuria, no frequency or urgency, no nephrolithiasis;  MUSCULOSKELETAL: Left upper extremity lymphedema  ENDOCRINE: no polyuria, no polydipsia, no cold or heat intolerance;  HEMATOLOGY: no easy bruising or bleeding, no history of clotting disorder;  DERMATOLOGY: no skin rash, no eczema, no pruritus;  PSYCHIATRY: no depression, no anxiety, no panic attacks, no suicidal ideation, no homicidal ideation;  NEUROLOGY: no syncope, no seizures, no numbness or tingling of hands, no numbness or tingling of feet, no paresis;    Objective   BP (!) 140/75   Pulse 53   Ht 5' 4\" (1.626 m)   Wt 263 lb 12.8 oz (119.7 kg)   SpO2 98%   BMI 45.28 kg/m²     PHYSICAL EXAM:  CONSTITUTIONAL: Alert, appropriate, no acute distress  EYES: Non icteric, EOM intact, pupils equal round   ENT: Mucus membranes moist, no oral pharyngeal lesions, external inspection of ears and nose are normal.  NECK: Supple, no masses. No palpable thyroid mass  CHEST/LUNGS: CTA bilaterally, normal respiratory effort   CARDIOVASCULAR: RRR, no murmurs. No lower extremity edema  ABDOMEN: soft non-tender, active bowel sounds, no HSM. No palpable masses  EXTREMITIES: warm, full ROM in all 4 extremities, no focal weakness. SKIN: warm, dry with no rashes or lesions  LYMPH: No cervical, clavicular, axillary, or inguinal lymphadenopathy  NEUROLOGIC: follows commands, non focal   PSYCH: mood and affect appropriate. Alert and oriented to time, place, person    LABORATORY RESULTS REVIEWED/ANALYZED BY ME:  2/25/2022 CBC  WBC 9.03  HGB 14.0    Himanshu 66.2    RADIOLOGY STUDIES REPORT/REVIEWED BY ME:  none      ASSESSMENT:  #History of Breast Cancer Her positive  Patient has a history of ER/CT negative, HER2 positive IDC of left breast. She received neoadjuvant taxotere/carbo/herceptin. Bilateral mastectomy showed complete response. She had adjuvant radiation therapy. She has had no evidence of disease recurrence. Breast exam: Unremarkable reconstructed breasts bilaterally.   -Continue annual physical exam.    #Chronic Lymphedema  Patient was referred to Dr. Trice Batista at [facility] by Dr. Jose A Ryan. She is status/post lymphovenous bypasses to the left upper extremity. She reports 75% improvement in baseline symptoms.      #Healthcare Maintenance  Last Mammogram: 2008 at [facility]  Last Pap Smear: 2019 at [facility] Colonoscopy: 2017 at [facility] Endoscopy: 2019 at [address] [place] [address]:  · RTC with MD Feb 2022  · Continue lymphedema treatment    IOsiel am pre-charting as a registered nurse for Ernesto Gusman MD. Electronically signed by Karan Luke RN on 2/25/2022 at 8:36 PM CST. Myara Aguila am scribing for Ernesto Gusman MD. Electronically signed by Karan Luke RN on 2/25/2022 at 11:01 AM CST. I, Dr Sabra Clement, personally performed the services described in this documentation as scribed by Karan Luke RN in my presence and is both accurate and complete. Electronically signed by Ernesto Gusman MD on 2/25/2022 at 11:01 AM

## 2022-02-25 ENCOUNTER — HOSPITAL ENCOUNTER (OUTPATIENT)
Dept: INFUSION THERAPY | Age: 67
Discharge: HOME | End: 2022-02-25
Payer: MEDICARE

## 2022-02-25 ENCOUNTER — OFFICE VISIT (OUTPATIENT)
Dept: HEMATOLOGY | Age: 67
End: 2022-02-25
Payer: MEDICARE

## 2022-02-25 VITALS
WEIGHT: 263.8 LBS | DIASTOLIC BLOOD PRESSURE: 75 MMHG | OXYGEN SATURATION: 98 % | BODY MASS INDEX: 45.04 KG/M2 | SYSTOLIC BLOOD PRESSURE: 140 MMHG | HEIGHT: 64 IN | HEART RATE: 53 BPM

## 2022-02-25 DIAGNOSIS — Z85.3 PERSONAL HISTORY OF BREAST CANCER: Primary | ICD-10-CM

## 2022-02-25 DIAGNOSIS — Z85.3 PERSONAL HISTORY OF BREAST CANCER: ICD-10-CM

## 2022-02-25 DIAGNOSIS — Z71.89 CARE PLAN DISCUSSED WITH PATIENT: ICD-10-CM

## 2022-02-25 LAB
BASOPHILS ABSOLUTE: 0.03 K/UL (ref 0.01–0.08)
BASOPHILS RELATIVE PERCENT: 0.3 % (ref 0.1–1.2)
EOSINOPHILS ABSOLUTE: 0.15 K/UL (ref 0.04–0.54)
EOSINOPHILS RELATIVE PERCENT: 1.7 % (ref 0.7–7)
HCT VFR BLD CALC: 41.7 % (ref 34.1–44.9)
HEMOGLOBIN: 14 G/DL (ref 11.2–15.7)
LYMPHOCYTES ABSOLUTE: 2.12 K/UL (ref 1.18–3.74)
LYMPHOCYTES RELATIVE PERCENT: 23.5 % (ref 19.3–53.1)
MCH RBC QN AUTO: 29.4 PG (ref 25.6–32.2)
MCHC RBC AUTO-ENTMCNC: 33.6 G/DL (ref 32.3–35.5)
MCV RBC AUTO: 87.6 FL (ref 79.4–94.8)
MONOCYTES ABSOLUTE: 0.75 K/UL (ref 0.24–0.82)
MONOCYTES RELATIVE PERCENT: 8.3 % (ref 4.7–12.5)
NEUTROPHILS ABSOLUTE: 5.98 K/UL (ref 1.56–6.13)
NEUTROPHILS RELATIVE PERCENT: 66.2 % (ref 34–71.1)
PDW BLD-RTO: 13.5 % (ref 11.7–14.4)
PLATELET # BLD: 326 K/UL (ref 182–369)
PMV BLD AUTO: 9.6 FL (ref 7.4–10.4)
RBC # BLD: 4.76 M/UL (ref 3.93–5.22)
WBC # BLD: 9.03 K/UL (ref 3.98–10.04)

## 2022-02-25 PROCEDURE — 99212 OFFICE O/P EST SF 10 MIN: CPT | Performed by: INTERNAL MEDICINE

## 2022-02-25 PROCEDURE — 85025 COMPLETE CBC W/AUTO DIFF WBC: CPT

## 2022-02-25 PROCEDURE — 99211 OFF/OP EST MAY X REQ PHY/QHP: CPT

## 2022-02-25 PROCEDURE — 36415 COLL VENOUS BLD VENIPUNCTURE: CPT

## 2022-03-22 NOTE — PROGRESS NOTES
HISTORY OF PRESENT ILLNESS:    Ms. Zamora [place] is a 61 y.o. presents today for an annual breast exam.   She has a personal history of breast cancer and underwent bilateral Mastectomies in 2008. She had her implants placed in 2010. All was performed in [address] [place] [address]. She apparently received neoadjuvant chemotherapy for a HER-2 positive cancer. She had a pathologic complete response and then had bilateral mastectomies with a right implant reconstruction with a memory gel implant and a pedicle TRAM on the left. She complains about some wrinkling in the subpectoral implant on the right and some animation deformity. She also has marked lymphedema on the left which still responds very well to compression and wrapping but she is very interested in therapeutic options. She has now had lymphovenous bypasses in her left arm with dramatic symptomatic relief in February 2021. She also had revision of her right subpectoral implant with placement in a prepectoral position and it feels much better and looks much better. This was all done by Dr. Martín Hunter in [place]    Unfortunately her lymphedema in her left arm is still present though she says it feels much better. It is still twice the size of her right arm. She no longer has pitting edema. She may be a candidate for a more extensive procedure and I advised her to research this. It would appear [facility] has a very aggressive lymphedema program and might be helpful. BREAST EXAM:    We did do photographs to start with. She has a pedicle TRAM on the left with no palpable masses but marked left-sided lymphedema. On the right she has recent surgery with dramatic cosmetic improvement. .    Neither shows any evidence of new or recurrent tumor. IMPRESSION: Doing extremely well after her recent surgeries.                           No evidence of recurrent disease                           Still with significant lymphedema on the left though it is improved    PLAN: Research higher lymphedema level of care              refer back to our lymphedema clinic              Follow-up with me in 1 year unless she has problems    I have seen, examined and reviewed this patient medication list, appropriate labs and imaging studies. I reviewed relevant medical records and others physicians notes. I discussed the plans of care with the patient. I answered all the questions to the patients satisfaction. I, Dr Carol Page, personally performed the services described in this documentation as scribed by Alfa Clements MA in my presence and is both accurate and complete. (Please note that portions of this note were completed with a voice recognition program. Efforts were made to edit the dictations but occasionally words are mis-transcribed.)  Over 50% of the total visit time of 25 minutes in face to face encounter with the patient, out of which more than 50% of the time was spent in counseling patient or family and coordination of care. Counseling included but was not limited to time spent reviewing labs, imaging studies/ treatment plan and answering questions.

## 2022-03-24 ENCOUNTER — OFFICE VISIT (OUTPATIENT)
Dept: SURGERY | Age: 67
End: 2022-03-24
Payer: MEDICARE

## 2022-03-24 VITALS
WEIGHT: 265.8 LBS | BODY MASS INDEX: 45.38 KG/M2 | HEIGHT: 64 IN | SYSTOLIC BLOOD PRESSURE: 132 MMHG | TEMPERATURE: 97.3 F | DIASTOLIC BLOOD PRESSURE: 74 MMHG

## 2022-03-24 DIAGNOSIS — Z90.13 S/P BILATERAL MASTECTOMY: Primary | ICD-10-CM

## 2022-03-24 DIAGNOSIS — I89.0 LYMPHEDEMA OF LEFT ARM: ICD-10-CM

## 2022-03-24 PROCEDURE — 99214 OFFICE O/P EST MOD 30 MIN: CPT | Performed by: SURGERY

## 2022-05-05 ENCOUNTER — TELEPHONE (OUTPATIENT)
Dept: OBGYN CLINIC | Age: 67
End: 2022-05-05

## 2022-05-31 ENCOUNTER — TELEPHONE (OUTPATIENT)
Dept: OBGYN CLINIC | Age: 67
End: 2022-05-31

## 2022-05-31 NOTE — TELEPHONE ENCOUNTER
Kristan Pisano requests that office return their call. The best time to reach her is Anytime. Patient states she is covid positive since this past weekend and it would be day 5 on June 2, she complains of vaginal itching which she has been dealing with for a month she states and wants to know the soonest she can be seen. Thank you.

## 2022-06-27 ENCOUNTER — OFFICE VISIT (OUTPATIENT)
Dept: OBGYN CLINIC | Age: 67
End: 2022-06-27

## 2022-06-27 VITALS
SYSTOLIC BLOOD PRESSURE: 133 MMHG | BODY MASS INDEX: 44.22 KG/M2 | DIASTOLIC BLOOD PRESSURE: 71 MMHG | HEART RATE: 59 BPM | HEIGHT: 64 IN | WEIGHT: 259 LBS

## 2022-06-27 DIAGNOSIS — Z12.72 SCREENING FOR VAGINAL CANCER: ICD-10-CM

## 2022-06-27 DIAGNOSIS — N89.8 VAGINAL ITCHING: Primary | ICD-10-CM

## 2022-06-27 DIAGNOSIS — L29.2 VULVAR ITCHING: ICD-10-CM

## 2022-06-27 LAB
BACTERIAL VAGINOSIS: NOT DETECTED
CANDIDA GLABRATA: NOT DETECTED
CANDIDA KRUSEI: NOT DETECTED
CANDIDA SPP: NOT DETECTED
TRICHOMONAS VAGINALIS: NOT DETECTED

## 2022-06-27 RX ORDER — ESTRADIOL 10 UG/1
10 INSERT VAGINAL
Qty: 8 TABLET | Refills: 3 | Status: SHIPPED | OUTPATIENT
Start: 2022-06-27 | End: 2022-09-28 | Stop reason: ALTCHOICE

## 2022-06-27 RX ORDER — CLOBETASOL PROPIONATE 0.5 MG/G
OINTMENT TOPICAL
Qty: 1 EACH | Refills: 1 | Status: SHIPPED | OUTPATIENT
Start: 2022-06-27 | End: 2022-09-28 | Stop reason: ALTCHOICE

## 2022-06-27 RX ORDER — FLUCONAZOLE 150 MG/1
150 TABLET ORAL
Qty: 2 TABLET | Refills: 0 | Status: SHIPPED | OUTPATIENT
Start: 2022-06-27 | End: 2022-07-03

## 2022-06-27 ASSESSMENT — ENCOUNTER SYMPTOMS
CONSTIPATION: 0
ALLERGIC/IMMUNOLOGIC NEGATIVE: 1
DIARRHEA: 0
GASTROINTESTINAL NEGATIVE: 1
RESPIRATORY NEGATIVE: 1
EYES NEGATIVE: 1

## 2022-06-27 NOTE — PROGRESS NOTES
Ansley Ceja is a 74 y.o. female who presents today for her medical conditions/ complaints as noted below. Ansley Ceja is c/o of Vaginal Itching        HPI  Pt presents c/o vulvar and vaginal itching. This has been off and on for several years. May also have a discharge but unsure. The itching is very intense in her groin and vulva. Has taken Diflucan. Has tried OTC preps- hydrocortisone cream and Vitamin A&D ointment, Desitin. No LMP recorded. Patient has had a hysterectomy. No obstetric history on file.     Past Medical History:   Diagnosis Date    Acid reflux     Breast cancer ([facility] [address] 2008    Dysplasia of cervix     Hyperlipidemia     Hypertension     Thyroid disease      Past Surgical History:   Procedure Laterality Date    BREAST RECONSTRUCTION  2010    BREAST RECONSTRUCTION  2011    BREAST RECONSTRUCTION  2011    BREAST SURGERY Right     implant changed    CHOLECYSTECTOMY  2006    COLONOSCOPY  12/2011    Pt will see Dr. Padmini Barahona 2017    GYNECOLOGIC CRYOSURGERY  2014    HYSTERECTOMY (CERVIX STATUS UNKNOWN)  1996    [facility] with retained ovaries    MASTECTOMY, BILATERAL  2008    OTHER SURGICAL HISTORY  2006    Sphincter ODODDI    [place] COLONOSCOPY FLX DX W/COLLJ SPEC WHEN PFRMD N/A 04/26/2017    Dr Mart-diverticulosis, 5 yr recall    [place] EGD TRANSORAL BIOPSY SINGLE/MULTIPLE N/A 04/11/2018    Dr Padmini Barahona (+) Reza's (-) dysplasia-Zuri (-)--1 yr recall    UPPER GASTROINTESTINAL ENDOSCOPY N/A 05/30/2019    Dr Joe Beatty-Gastropathy, (+) Reza's, (-) dysplasia--1 yr recall     Family History   Problem Relation Age of Onset    Cancer Father 36        Lymphoma    Stroke Maternal Grandfather     Colon Cancer Neg Hx     Colon Polyps Neg Hx     Liver Cancer Neg Hx     Liver Disease Neg Hx     Esophageal Cancer Neg Hx     Rectal Cancer Neg Hx     Stomach Cancer Neg Hx      Social History     Tobacco Use    Smoking status: Never Smoker    Smokeless tobacco: Never Used   Substance Use Topics    Alcohol use: Yes     Comment: rarely       Current Outpatient Medications   Medication Sig Dispense Refill    nystatin-triamcinolone (MYCOLOG II) 654037-0.1 UNIT/GM-% cream Apply topically 2 times daily. 1 each 1    clobetasol (TEMOVATE) 0.05 % ointment Apply topically 2 times daily. 1 each 1    fluconazole (DIFLUCAN) 150 MG tablet Take 1 tablet by mouth every 72 hours for 6 days 2 tablet 0    Estradiol (VAGIFEM) 10 MCG TABS vaginal tablet Place 1 tablet vaginally Twice a Week 8 tablet 3    clobetasol (TEMOVATE) 0.05 % ointment Apply topically 2 times daily. 60 g 2    escitalopram (LEXAPRO) 10 MG tablet Take 10 mg by mouth daily      polycarbophil (FIBERCON) 625 MG tablet Take 2 tablets by mouth every evening      levothyroxine (SYNTHROID) 50 MCG tablet Take 50 mcg by mouth Daily      omeprazole (PRILOSEC) 20 MG delayed release capsule Take 20 mg by mouth daily      ezetimibe (ZETIA) 10 MG tablet Take 10 mg by mouth daily      Cholecalciferol (VITAMIN D3) 2000 UNITS CAPS Take by mouth       No current facility-administered medications for this visit. Allergies   Allergen Reactions    Neosporin [Neomycin-Polymyxin-Gramicidin]     Penicillins     Sulfa Antibiotics     Zithromax [Azithromycin]      Vitals:    06/27/22 1118   BP: 133/71   Pulse: 59     Body mass index is 44.46 kg/m². Review of Systems   Constitutional: Negative. HENT: Negative. Eyes: Negative. Respiratory: Negative. Cardiovascular: Negative. Gastrointestinal: Negative. Negative for constipation and diarrhea. Endocrine: Negative. Genitourinary: Positive for dyspareunia and vaginal pain. Negative for frequency, menstrual problem, urgency and vaginal discharge. Musculoskeletal: Negative. Skin: Negative. Allergic/Immunologic: Negative. Neurological: Negative. Hematological: Negative. Psychiatric/Behavioral: Negative. All other systems reviewed and are negative.         Physical Exam  Vitals and nursing note reviewed. Constitutional:       Appearance: She is well-developed. HENT:      Head: Normocephalic. Right Ear: External ear normal.      Left Ear: External ear normal.      Nose: Nose normal.   Genitourinary:     Labia:         Right: Rash and tenderness present. Left: Rash and tenderness present. Vagina: Vaginal discharge (thin white), erythema and tenderness present. Uterus: Absent. Comments: Swab collected  Pap collected  Musculoskeletal:         General: Normal range of motion. Cervical back: Normal range of motion. Skin:     General: Skin is warm and dry. Neurological:      Mental Status: She is alert and oriented to person, place, and time. Psychiatric:         Attention and Perception: Attention normal.         Mood and Affect: Mood normal.         Speech: Speech normal.         Behavior: Behavior normal.         Thought Content: Thought content normal.         Cognition and Memory: Cognition normal.         Judgment: Judgment normal.          Diagnosis Orders   1. Vaginal itching     2. Vulvar itching  nystatin-triamcinolone (MYCOLOG II) 474552-9.1 UNIT/GM-% cream    clobetasol (TEMOVATE) 0.05 % ointment    fluconazole (DIFLUCAN) 150 MG tablet    Estradiol (VAGIFEM) 10 MCG TABS vaginal tablet       MEDICATIONS:  Orders Placed This Encounter   Medications    nystatin-triamcinolone (MYCOLOG II) 642558-3.6 UNIT/GM-% cream     Sig: Apply topically 2 times daily. Dispense:  1 each     Refill:  1    clobetasol (TEMOVATE) 0.05 % ointment     Sig: Apply topically 2 times daily.      Dispense:  1 each     Refill:  1    fluconazole (DIFLUCAN) 150 MG tablet     Sig: Take 1 tablet by mouth every 72 hours for 6 days     Dispense:  2 tablet     Refill:  0    Estradiol (VAGIFEM) 10 MCG TABS vaginal tablet     Sig: Place 1 tablet vaginally Twice a Week     Dispense:  8 tablet     Refill:  3       ORDERS:  No orders of the defined types were placed in this encounter. PLAN:  Discussed atrophic vaginal changes, possibly yeast and lichens  Tx given  Swab and pap pending  Will apply Mycolog to groin and Clobetasol mixed with Desitin  Start Vagifem  F/u in 1 month  Patient Instructions     Patient Education        Lichen Sclerosus: Care Instructions  Your Care Instructions  Lichen sclerosus is a long-term (chronic) skin problem that causes thin, wrinkled white patches. The patches are itchy and painful. If the skin tears, bright red or purple spots may appear. In most cases, it occurs on the skin of the anus (the opening where stool leaves the body), the vulva (the area aroundthe vagina), and the tip of the penis in men who haven't been circumcised. Doctors aren't sure what causes lichen sclerosus. It isn't caused by aninfection, and it's not contagious. You can't spread it to others. If the skin patches are on the anus, vulva, or penis, they may need to be treated. If these areas aren't treated, the skin can thicken and scar. This can narrow the openings to the vagina and anus. The foreskin over the penis may tighten and shrink. If this happens, going to the bathroom and having sex canbe painful. Lichen sclerosus is usually treated with strong prescription cream or ointment. The medicine stops the inflammation, but the scarring of the skin might not completely go away. Men with scarring from advanced cases on the tip of thepenis may have surgery to remove the foreskin. Skin patches on any other part of the body usually go away on their own withouttreatment. You may have a small increased risk of skin cancer on the affected area. Newman Olszewski will examine the skin at least once a year. Follow-up care is a key part of your treatment and safety. Be sure to make and go to all appointments, and call your doctor if you are having problems. It's also a good idea to know your test results and keep alist of the medicines you take.   How can you care for yourself at home?   Be safe with medicines. If your doctor prescribed a cream, apply it exactly as directed. Call your doctor if you think you are having a problem with your medicine.  Put cold, wet cloths on the area to reduce itching.  Wear loose-fitting clothes. Avoid nylon and other fabric that holds moisture close to the skin. This may allow an infection to start.  If your doctor told you to use nonprescription moisturizing cream on your skin, read and follow the directions on the label. Care tips for women   Do not douche, unless your doctor tells you to.  Avoid hot baths. Don't use soaps or bath products to wash the area around your vulva. Rinse with water only, and gently pat the area dry. Care tips for men   Keep your penis clean. If you haven't been circumcised, gently pull the foreskin back to wash your penis with warm water. Make sure your penis is dry before you get dressed. When should you call for help? Call your doctor now or seek immediate medical care if:     You have symptoms of infection, such as:  ? Increased pain, swelling, warmth, or redness. ? Red streaks leading from the area. ? Pus draining from the area. ? A fever. Watch closely for changes in your health, and be sure to contact your doctor if:     The affected area grows or changes.      You do not get better as expected. Where can you learn more? Go to https://[email or web address].[email or web address] org and sign in to your TravelLine account. Enter F427 in the [facility] box to learn more about \"Lichen Sclerosus: Care Instructions. \"     If you do not have an account, please click on the \"Sign Up Now\" link. Current as of: November 15, 2021               Content Version: 13.3  © 5768-6833 [employer]. Care instructions adapted under license by [facility] ([facility]).  If you have questions about a medical condition or this instruction, always ask your healthcare professional. [address] any warranty or liability for your use of this information.

## 2022-06-27 NOTE — PATIENT INSTRUCTIONS
Patient Education        Lichen Sclerosus: Care Instructions  Your Care Instructions  Lichen sclerosus is a long-term (chronic) skin problem that causes thin, wrinkled white patches. The patches are itchy and painful. If the skin tears, bright red or purple spots may appear. In most cases, it occurs on the skin of the anus (the opening where stool leaves the body), the vulva (the area aroundthe vagina), and the tip of the penis in men who haven't been circumcised. Doctors aren't sure what causes lichen sclerosus. It isn't caused by aninfection, and it's not contagious. You can't spread it to others. If the skin patches are on the anus, vulva, or penis, they may need to be treated. If these areas aren't treated, the skin can thicken and scar. This can narrow the openings to the vagina and anus. The foreskin over the penis may tighten and shrink. If this happens, going to the bathroom and having sex canbe painful. Lichen sclerosus is usually treated with strong prescription cream or ointment. The medicine stops the inflammation, but the scarring of the skin might not completely go away. Men with scarring from advanced cases on the tip of thepenis may have surgery to remove the foreskin. Skin patches on any other part of the body usually go away on their own withouttreatment. You may have a small increased risk of skin cancer on the affected area. Nereida Pollack will examine the skin at least once a year. Follow-up care is a key part of your treatment and safety. Be sure to make and go to all appointments, and call your doctor if you are having problems. It's also a good idea to know your test results and keep alist of the medicines you take. How can you care for yourself at home?  Be safe with medicines. If your doctor prescribed a cream, apply it exactly as directed. Call your doctor if you think you are having a problem with your medicine.  Put cold, wet cloths on the area to reduce itching.    Wear loose-fitting clothes. Avoid nylon and other fabric that holds moisture close to the skin. This may allow an infection to start.  If your doctor told you to use nonprescription moisturizing cream on your skin, read and follow the directions on the label. Care tips for women   Do not douche, unless your doctor tells you to.  Avoid hot baths. Don't use soaps or bath products to wash the area around your vulva. Rinse with water only, and gently pat the area dry. Care tips for men   Keep your penis clean. If you haven't been circumcised, gently pull the foreskin back to wash your penis with warm water. Make sure your penis is dry before you get dressed. When should you call for help? Call your doctor now or seek immediate medical care if:     You have symptoms of infection, such as:  ? Increased pain, swelling, warmth, or redness. ? Red streaks leading from the area. ? Pus draining from the area. ? A fever. Watch closely for changes in your health, and be sure to contact your doctor if:     The affected area grows or changes.      You do not get better as expected. Where can you learn more? Go to [email or web address] org and sign in to your SeeToo account. Enter H615 in the [employer] box to learn more about \"Lichen Sclerosus: Care Instructions. \"     If you do not have an account, please click on the \"Sign Up Now\" link. Current as of: November 15, 2021               Content Version: 13.3  © 2006-2022 Healthwise[employer] Incorporated. Care instructions adapted under license by [facility] ([facility]). If you have questions about a medical condition or this instruction, always ask your healthcare professional. [place] [address] any warranty or liability for your use of this information.

## 2022-06-30 LAB
HPV COMMENT: NORMAL
HPV TYPE 16: NOT DETECTED
HPV TYPE 18: NOT DETECTED
HPVOH (OTHER TYPES): NOT DETECTED

## 2022-08-01 ENCOUNTER — OFFICE VISIT (OUTPATIENT)
Dept: OBGYN CLINIC | Age: 67
End: 2022-08-01
Payer: MEDICARE

## 2022-08-01 VITALS
HEART RATE: 53 BPM | SYSTOLIC BLOOD PRESSURE: 128 MMHG | HEIGHT: 64 IN | WEIGHT: 262 LBS | BODY MASS INDEX: 44.73 KG/M2 | DIASTOLIC BLOOD PRESSURE: 72 MMHG

## 2022-08-01 DIAGNOSIS — N89.8 VAGINAL ITCHING: Primary | ICD-10-CM

## 2022-08-01 DIAGNOSIS — L29.2 VULVAR ITCHING: ICD-10-CM

## 2022-08-01 PROCEDURE — 1123F ACP DISCUSS/DSCN MKR DOCD: CPT | Performed by: NURSE PRACTITIONER

## 2022-08-01 PROCEDURE — 99213 OFFICE O/P EST LOW 20 MIN: CPT | Performed by: NURSE PRACTITIONER

## 2022-08-01 ASSESSMENT — ENCOUNTER SYMPTOMS
RESPIRATORY NEGATIVE: 1
ALLERGIC/IMMUNOLOGIC NEGATIVE: 1
DIARRHEA: 0
GASTROINTESTINAL NEGATIVE: 1
CONSTIPATION: 0
EYES NEGATIVE: 1

## 2022-08-01 NOTE — PROGRESS NOTES
Delma Horner is a 74 y.o. female who presents today for her medical conditions/ complaints as noted below. Delma Horner is c/o of Follow-up, Vaginal Itching, and Other (Vulvar itching )        HPI  Pt presents for 2 week f/u on vaginal and vulvar itching. Normal pap and swab. Used Clobestasol cream and Mycolog cream for several days with good improvement of itching. Has not started Vagifem because it makes her nervous with history of breast cancer. No LMP recorded. Patient has had a hysterectomy. No obstetric history on file. Past Medical History:   Diagnosis Date    Acid reflux     Breast cancer ([facility] [address] 2008    Dysplasia of cervix     Hyperlipidemia     Hypertension     Thyroid disease      Past Surgical History:   Procedure Laterality Date    BREAST RECONSTRUCTION  2010    BREAST RECONSTRUCTION  2011    BREAST RECONSTRUCTION  2011    BREAST SURGERY Right     implant changed    CHOLECYSTECTOMY  2006    COLONOSCOPY  12/2011    Pt will see Dr. Eric Kruse 2017    Breanne Ayala  2014    HYSTERECTOMY ([address] [address])  Orland Najjar with retained ovaries    MASTECTOMY, BILATERAL  2008    OTHER SURGICAL HISTORY  2006    Sphincter ODODDI    [place] COLONOSCOPY FLX DX W/COLLJ SPEC WHEN PFRMD N/A 04/26/2017    Dr Mart-diverticulosis, 5 yr recall    [place] EGD TRANSORAL BIOPSY SINGLE/MULTIPLE N/A 04/11/2018    Dr Eric Kruse (+) Reza's (-) dysplasia-Zuri (-)--1 yr recall    UPPER GASTROINTESTINAL ENDOSCOPY N/A 05/30/2019    Dr Perfecto Beatty-Gastropathy, (+) Reza's, (-) dysplasia--1 yr recall     Family History   Problem Relation Age of Onset    Cancer Father 36        Lymphoma    Stroke Maternal Grandfather     Colon Cancer Neg Hx     Colon Polyps Neg Hx     Liver Cancer Neg Hx     Liver Disease Neg Hx     Esophageal Cancer Neg Hx     Rectal Cancer Neg Hx     Stomach Cancer Neg Hx      Social History     Tobacco Use    Smoking status: Never    Smokeless tobacco: Never   Substance Use Topics    Alcohol use:  Yes Comment: rarely       Current Outpatient Medications   Medication Sig Dispense Refill    nystatin-triamcinolone (MYCOLOG II) 562755-6.1 UNIT/GM-% cream Apply topically 2 times daily. 1 each 1    clobetasol (TEMOVATE) 0.05 % ointment Apply topically 2 times daily. 1 each 1    Estradiol (VAGIFEM) 10 MCG TABS vaginal tablet Place 1 tablet vaginally Twice a Week 8 tablet 3    clobetasol (TEMOVATE) 0.05 % ointment Apply topically 2 times daily. 60 g 2    escitalopram (LEXAPRO) 10 MG tablet Take 10 mg by mouth daily      polycarbophil (FIBERCON) 625 MG tablet Take 2 tablets by mouth every evening      levothyroxine (SYNTHROID) 50 MCG tablet Take 50 mcg by mouth Daily      omeprazole (PRILOSEC) 20 MG delayed release capsule Take 20 mg by mouth daily      ezetimibe (ZETIA) 10 MG tablet Take 10 mg by mouth daily      Cholecalciferol (VITAMIN D3) 2000 UNITS CAPS Take by mouth       No current facility-administered medications for this visit. Allergies   Allergen Reactions    Neosporin [Neomycin-Polymyxin-Gramicidin]     Penicillins     Sulfa Antibiotics     Zithromax [Azithromycin]      Vitals:    08/01/22 1308   BP: 128/72   Pulse: 53     Body mass index is 44.97 kg/m². Review of Systems   Constitutional: Negative. HENT: Negative. Eyes: Negative. Respiratory: Negative. Cardiovascular: Negative. Gastrointestinal: Negative. Negative for constipation and diarrhea. Endocrine: Negative. Genitourinary: Negative. Negative for frequency, menstrual problem and urgency. Musculoskeletal: Negative. Skin: Negative. Allergic/Immunologic: Negative. Neurological: Negative. Hematological: Negative. Psychiatric/Behavioral: Negative. All other systems reviewed and are negative. Physical Exam  Vitals and nursing note reviewed. Constitutional:       Appearance: She is well-developed. HENT:      Head: Normocephalic.       Right Ear: External ear normal.      Left Ear: External ear normal.      Nose: Nose normal.   Genitourinary:     General: Normal vulva. Pubic Area: No rash. Labia:         Right: No rash or tenderness. Left: No rash or tenderness. Vagina: Erythema (atrophic changes) present. Musculoskeletal:         General: Normal range of motion. Cervical back: Normal range of motion. Skin:     General: Skin is warm and dry. Neurological:      Mental Status: She is alert and oriented to person, place, and time. Psychiatric:         Attention and Perception: Attention normal.         Mood and Affect: Mood normal.         Speech: Speech normal.         Behavior: Behavior normal.         Thought Content: Thought content normal.         Cognition and Memory: Cognition normal.         Judgment: Judgment normal.        Diagnosis Orders   1. Vaginal itching        2. Vulvar itching            MEDICATIONS:  No orders of the defined types were placed in this encounter. ORDERS:  No orders of the defined types were placed in this encounter. PLAN:  Much improved exam and symptoms  Can used Clobetasol and Mycolog cream as needed- it stops working, will f/u for biopsy- pt states understanding  Encouraged Vagifem    There are no Patient Instructions on file for this visit.

## 2022-08-16 NOTE — PROGRESS NOTES
Problem: Diabetes Comorbidity  Goal: Blood Glucose Level Within Targeted Range  Intervention: Monitor and Manage Glycemia  Flowsheets (Taken 8/16/2022 0254)  Glycemic Management: blood glucose monitored     Problem: Fall Injury Risk  Goal: Absence of Fall and Fall-Related Injury  Intervention: Identify and Manage Contributors  Flowsheets (Taken 8/16/2022 0254)  Medication Review/Management: medications reviewed  Intervention: Promote Injury-Free Environment  Flowsheets (Taken 8/16/2022 0254)  Safety Promotion/Fall Prevention:   assistive device/personal item within reach   bed alarm set   Fall Risk reviewed with patient/family   medications reviewed   instructed to call staff for mobility     Problem: Hypertension Comorbidity  Goal: Blood Pressure in Desired Range  Intervention: Maintain Blood Pressure Management  Flowsheets (Taken 8/16/2022 0254)  Medication Review/Management: medications reviewed     Problem: Fluid Volume Excess  Goal: Fluid Balance  Intervention: Monitor and Manage Hypervolemia  Flowsheets (Taken 8/16/2022 0254)  Fluid/Electrolyte Management: fluids restricted      Physical Therapy  Daily Treatment Note  Date: 8/10/2021  Patient Name: Mark Shi  MRN: [id number]     :   1955    Subjective:   General  Chart Reviewed: Yes  Additional Pertinent Hx: History of present Illness: Left Upper Extremity Lymphedema, Stage 2 - Patient was diagnosed with breast cancer 2008 and had a double masectomy in  with a breast reconstruction in . 6 months later she started noticing swelling. She started therapy in  in [facility]. She recived Manual Lymph drainage and short stretch bandage. In  she moved to [facility]  received treatment at [facility] . She underwent Lymphaticovenous Bypass on  and has been wrapping her arm at least 3x a week. She still has notable swelling but she reports she felt instant relief after surgery. She reports surgeon stated it can take 3-4 months to lose more swelling. Response To Previous Treatment: Patient with no complaints from previous session. Family / Caregiver Present: No  Referring Practitioner: Leatha Altamirano  PT Visit Information  PT Insurance Information: Medicare/ BCBS  Total # of Visits Approved: 19  Total # of Visits to Date:   Progress Note Due Date: 21  Subjective  Subjective: I have gone back to wraping because that reductin kit just won't stay on. Pain Screening  Patient Currently in Pain: No  Vital Signs  Patient Currently in Pain: No       Treatment Activities:       Exercises  Exercise 4: Skin Care yes  Exercise 6: Measurements: yes  and Ldex score no  Exercise 9: Application of long term garment           Assessment:   Conditions Requiring Skilled Therapeutic Intervention  Assessment: Patient entered clinic with left arm wrapped in short stretch bandages. She states that reduction kit is just not staying on and staying tight. Patient brought her sleeve doning aid with her today.   She had complaint of gauntlet not wanting to stay on wrist.  Advised patient to place gauntlet under sleeve to prevent it from rolling down. Measurements were taken today as this is her last visit. Therapist stressed the importance of continuing using pump and wraping at night. Prognosis: Good  REQUIRES PT FOLLOW UP: No  Discharge Recommendations: Defer PT at this time    Goals:  Short term goals  Time Frame for Short term goals: 1-2 weeks  Short term goal 1: Patient will be indep with HEP met (5/4: performs daily)  Short term goal 2: Patient will be indep with self MLD met (5/4: performs daily)  Short term goal 3: Patient will decrease 2-5 cm in each placements of the left UE progress (5/4: over the last 2 weeks patient has had decreased circumference measurments but numbers have increased this week)  Long term goals  Time Frame for Long term goals : 4-8 weeks  Long term goal 1: Patient to be indep to don and doff compression garments properly without undue skin friction to reduce risk for skin breakdown, and be Indep with wearing schedule. MET (5/4: still in decongestive phase  08/10 pateint shows good understanding of don/doff without undue skin friction. Verbalized good understanding to watch for skin breakdown. She states she is wearing garment daily)  Long term goal 2: Patient Lymphedema Life Impact Scale Impairment score will decrease by 20% progress    MET (5/4: decreased by 17%  08/10 24% impairment)  Long term goal 3: Patient L-Dex score will decrease by 10 points progress  met (5/4: the week of 4/27 her L-dex score decreased greater than 10% but it has increased greater than her evaluation score this week 7/21 decreased by 30 points)  Long term goal 4: PT will recommend proper maintenance garment for daytime wear.  met (7/21 garment ordered today)  Patient Goals   Patient goals : I want to decrease arm size to prepare for surgery  Plan:    Plan  Plan Comment: Discharge at this time  Timed Code Treatment Minutes: 40 Minutes     Therapy Time   Individual Concurrent Group Co-treatment   Time In 1400         Time Out 1440 Minutes 40         Timed Code Treatment Minutes: 40 Minutes  Electronically signed by Rosalva Jackson PTA on 8/10/2021 at 3:04 PM

## 2022-09-28 ENCOUNTER — OFFICE VISIT (OUTPATIENT)
Dept: GASTROENTEROLOGY | Age: 67
End: 2022-09-28
Payer: MEDICARE

## 2022-09-28 ENCOUNTER — TELEPHONE (OUTPATIENT)
Dept: GASTROENTEROLOGY | Age: 67
End: 2022-09-28

## 2022-09-28 ENCOUNTER — TRANSCRIBE ORDERS (OUTPATIENT)
Dept: ADMINISTRATIVE | Facility: HOSPITAL | Age: 67
End: 2022-09-28

## 2022-09-28 VITALS
SYSTOLIC BLOOD PRESSURE: 120 MMHG | WEIGHT: 263 LBS | HEART RATE: 61 BPM | HEIGHT: 64 IN | DIASTOLIC BLOOD PRESSURE: 80 MMHG | OXYGEN SATURATION: 97 % | BODY MASS INDEX: 44.9 KG/M2

## 2022-09-28 DIAGNOSIS — K22.70 BARRETT'S ESOPHAGUS WITHOUT DYSPLASIA: ICD-10-CM

## 2022-09-28 DIAGNOSIS — R10.11 INTERMITTENT RIGHT UPPER QUADRANT ABDOMINAL PAIN: ICD-10-CM

## 2022-09-28 DIAGNOSIS — R10.11 INTERMITTENT RIGHT UPPER QUADRANT ABDOMINAL PAIN: Primary | ICD-10-CM

## 2022-09-28 DIAGNOSIS — R19.7 DIARRHEA, UNSPECIFIED TYPE: Primary | ICD-10-CM

## 2022-09-28 LAB
ALBUMIN SERPL-MCNC: 4.2 G/DL (ref 3.5–5.2)
ALP BLD-CCNC: 73 U/L (ref 35–104)
ALT SERPL-CCNC: 15 U/L (ref 5–33)
AST SERPL-CCNC: 14 U/L (ref 5–32)
BILIRUB SERPL-MCNC: 1 MG/DL (ref 0.2–1.2)
BILIRUBIN DIRECT: 0.2 MG/DL (ref 0–0.3)
BILIRUBIN, INDIRECT: 0.8 MG/DL (ref 0.1–1)
TOTAL PROTEIN: 6.8 G/DL (ref 6.6–8.7)

## 2022-09-28 PROCEDURE — 99203 OFFICE O/P NEW LOW 30 MIN: CPT | Performed by: NURSE PRACTITIONER

## 2022-09-28 PROCEDURE — 1123F ACP DISCUSS/DSCN MKR DOCD: CPT | Performed by: NURSE PRACTITIONER

## 2022-09-28 RX ORDER — BISOPROLOL FUMARATE 10 MG/1
10 TABLET ORAL DAILY
COMMUNITY
Start: 2022-08-23

## 2022-09-28 ASSESSMENT — ENCOUNTER SYMPTOMS
COUGH: 0
ANAL BLEEDING: 0
VOMITING: 0
BLOOD IN STOOL: 0
VOICE CHANGE: 0
NAUSEA: 0
RECTAL PAIN: 0
ABDOMINAL DISTENTION: 0
DIARRHEA: 1
BACK PAIN: 0
ABDOMINAL PAIN: 1
CONSTIPATION: 0
SHORTNESS OF BREATH: 0
SORE THROAT: 0
TROUBLE SWALLOWING: 0

## 2022-09-28 NOTE — PATIENT INSTRUCTIONS

## 2022-09-28 NOTE — PROGRESS NOTES
Subjective:      Tristan Cook is a70 y.o. female  Chief Complaint   Patient presents with    Endoscopy       HPI  PCP: RAFAEL Navarro - CNP  New pt to me  Previous pt of RAFAEL Segovia  Pt has a hx of Barretts esophagus  Is due for surveillance EGD  Currently on prilosec daily for acid reflux and  this works works well to keep it controlled    Reports RUQ pain  Occurs after eating  Started 10 years ago  Occurs 3x a week on average  Describes as \"sharp\" pain when it comes on, and lasts 15 minutes and resolves on its own. 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 Is s/p gael. Juan F Amin performed CT abdomen for eval of this pain in 2019 and deemed pain to be from possible non-GI origin. Reports diarrhea  Occurs intermittently  Started about a year ago  Most days has normal formed stools  But about once a week has fecal urgency with diarrhea \"all day long\", up to 4-5 diarrhea stools in a days time. No blood in stool. Sometimes has lower abd cramping prior to the diarrhea stool but not all the time. Family HX:    Pt denies family hx of colon polyps, colon CA, inflammatory bowel dx, gastric CA and esophageal CA.     Past Medical History:   Diagnosis Date    Acid reflux     Breast cancer ([address] 2008    Dysplasia of cervix     Hyperlipidemia     Hypertension     Thyroid disease           Past Surgical History:   Procedure Laterality Date    BREAST RECONSTRUCTION  2010    BREAST RECONSTRUCTION  2011    BREAST RECONSTRUCTION  2011    BREAST SURGERY Right     implant changed    CHOLECYSTECTOMY  2006    COLONOSCOPY  12/2011    Pt will see Dr. Corinne Clack 2017    [facility]  2014    HYSTERECTOMY ([address])  1996    TVH with retained ovaries    MASTECTOMY, BILATERAL  2008    OTHER SURGICAL HISTORY  2006    Sphincter ODODDI [place] COLONOSCOPY FLX DX W/COLLJ SPEC WHEN PFRMD N/A 04/26/2017    Dr Mart-diverticulosis, 5 yr recall    [place] EGD TRANSORAL BIOPSY SINGLE/MULTIPLE N/A 04/11/2018    Dr Lillie Mccurdy (+) Reza's (-) dysplasia-Zuri (-)--1 yr recall    UPPER GASTROINTESTINAL ENDOSCOPY N/A 05/30/2019    Dr Balwinder Beatty-Gastropathy, (+) Reza's, (-) dysplasia--1 yr recall       Social History     Socioeconomic History    Marital status:    Tobacco Use    Smoking status: Never    Smokeless tobacco: Never   Vaping Use    Vaping Use: Never used   Substance and Sexual Activity    Alcohol use: Yes     Comment: rarely    Drug use: No       Allergies   Allergen Reactions    Neosporin [Neomycin-Polymyxin-Gramicidin]     Penicillins     Sulfa Antibiotics     Zithromax [Azithromycin]        Current Outpatient Medications   Medication Sig Dispense Refill    bisoprolol (ZEBETA) 10 MG tablet Take 10 mg by mouth daily      escitalopram (LEXAPRO) 10 MG tablet Take 10 mg by mouth daily      polycarbophil (FIBERCON) 625 MG tablet Take 2 tablets by mouth every evening      levothyroxine (SYNTHROID) 50 MCG tablet Take 50 mcg by mouth Daily      omeprazole (PRILOSEC) 20 MG delayed release capsule Take 20 mg by mouth daily      ezetimibe (ZETIA) 10 MG tablet Take 10 mg by mouth daily      Cholecalciferol (VITAMIN D3) 2000 UNITS CAPS Take by mouth       No current facility-administered medications for this visit. Review of Systems   Constitutional:  Negative for appetite change, fatigue, fever and unexpected weight change. HENT:  Negative for sore throat, trouble swallowing and voice change. Respiratory:  Negative for cough and shortness of breath. Cardiovascular:  Negative for chest pain, palpitations and leg swelling. Gastrointestinal:  Positive for abdominal pain and diarrhea. Negative for abdominal distention, anal bleeding, blood in stool, constipation, nausea, rectal pain and vomiting. Genitourinary:  Negative for hematuria. Musculoskeletal:  Negative for arthralgias, back pain and neck pain. Neurological:  Negative for dizziness, weakness, light-headedness and headaches. Psychiatric/Behavioral:  Negative for dysphoric mood and sleep disturbance. The patient is not nervous/anxious. All other systems reviewed and are negative. Objective:     Physical Exam  Vitals and nursing note reviewed. Constitutional:       Appearance: She is well-developed. Comments: /80 (Site: Left Upper Arm)   Pulse 61   Ht 5' 4\" (1.626 m)   Wt 263 lb (119.3 kg)   SpO2 97%   BMI 45.14 kg/m²    Eyes:      General: No scleral icterus. Conjunctiva/sclera: Conjunctivae normal.      Pupils: Pupils are equal, round, and reactive to light. Neck:      Thyroid: No thyromegaly. Cardiovascular:      Rate and Rhythm: Normal rate and regular rhythm. Heart sounds: Normal heart sounds. No murmur heard. No friction rub. No gallop. Pulmonary:      Effort: Pulmonary effort is normal. No respiratory distress. Breath sounds: Normal breath sounds. Abdominal:      General: Bowel sounds are normal. There is no distension. Palpations: Abdomen is soft. Tenderness: There is no abdominal tenderness. There is no rebound. Musculoskeletal:         General: No deformity. Normal range of motion. Cervical back: Normal range of motion and neck supple. Neurological:      Mental Status: She is alert and oriented to person, place, and time. Cranial Nerves: No cranial nerve deficit. Psychiatric:         Judgment: Judgment normal.         Assessment:       Diagnosis Orders   1. Diarrhea, unspecified type  Gastrointestinal Panel, Molecular    COLONOSCOPY W/ OR W/O BIOPSY    ESOPHAGOSCOPY / EGD      2. Intermittent right upper quadrant abdominal pain  Hepatic Function Panel    US GALLBLADDER RUQ    ESOPHAGOSCOPY / EGD      3.  Reza's esophagus without dysplasia  ESOPHAGOSCOPY / EGD            Plan:      Stool testing ordered  Getting a HFP to be done today (for baseline) and a RUQ US. Also gave her handwritten order for HFP that she knows to do during an acute pain episode. Further recs per dr Faisal Maria since I will not be at this practice any longer for f/u in regards to work up for this pain. Schedule outpatient endoscopy. Patient advised no Aspirin, Fish Oil, Vit E or NSAIDs 5 (five) days before procedure. Follow-up Visit: per Dr. Faisal Maria  Pt Education:   Risks, benefits, and alternatives to endoscopy were discussed. Patient voices understanding of risks of, but not limited to, perforation, bleeding, and infection. The risk of perforation is increased with esophageal dilatation. All questions answered to patient's satisfaction. Patient is agreable to proceed. Schedule outpatient colonoscopy. Patient advised no Aspirin, Fish Oil, Vit E or NSAIDs 5 (five) days before procedure. Follow-up Visit: per Dr Faisal Maira  Pt education:  Risks, benefits, and alternatives to colonoscopy were discussed. Risks of colonoscopy include, but are not limited to, perforation, bleeding, and infection. We discussed that the risk for perforation is 1-3 in 5,000  at the time of colonoscopy;   and 1-2% risk of bleeding post-polypectomy. All questions answered to the satisfaction of the patient. Pt is agreeable to proceed.

## 2022-10-07 ENCOUNTER — HOSPITAL ENCOUNTER (OUTPATIENT)
Dept: ULTRASOUND IMAGING | Facility: HOSPITAL | Age: 67
Discharge: HOME | End: 2022-10-07
Admitting: NURSE PRACTITIONER

## 2022-10-07 DIAGNOSIS — R10.11 INTERMITTENT RIGHT UPPER QUADRANT ABDOMINAL PAIN: ICD-10-CM

## 2022-10-07 PROCEDURE — 76705 ECHO EXAM OF ABDOMEN: CPT

## 2022-10-11 ENCOUNTER — TELEPHONE (OUTPATIENT)
Dept: GASTROENTEROLOGY | Age: 67
End: 2022-10-11

## 2022-10-11 NOTE — TELEPHONE ENCOUNTER
[phone] . [place] Lucia.,  PT HAS BEEN NOTIFIED OF RESULT VIA VOICEMAIL. PERSONAL REMINDER PUT IN SYSTEM TO CALL PT IN 1 YR.

## 2022-10-11 NOTE — TELEPHONE ENCOUNTER
Please let Kya Donaldson know the ultrasound showed a fatty liver, otherwise essentially normal.  I recommend annual fatty liver surveillance with labs/US.   thanks

## 2022-11-07 ENCOUNTER — ANESTHESIA EVENT (OUTPATIENT)
Dept: ENDOSCOPY | Age: 67
End: 2022-11-07
Payer: MEDICARE

## 2022-11-09 ENCOUNTER — ANESTHESIA (OUTPATIENT)
Dept: ENDOSCOPY | Age: 67
End: 2022-11-09
Payer: MEDICARE

## 2022-11-09 ENCOUNTER — HOSPITAL ENCOUNTER (OUTPATIENT)
Age: 67
Setting detail: OUTPATIENT SURGERY
Discharge: HOME | End: 2022-11-09
Attending: INTERNAL MEDICINE | Admitting: INTERNAL MEDICINE
Payer: MEDICARE

## 2022-11-09 VITALS
DIASTOLIC BLOOD PRESSURE: 77 MMHG | OXYGEN SATURATION: 96 % | HEIGHT: 64 IN | TEMPERATURE: 97 F | HEART RATE: 68 BPM | SYSTOLIC BLOOD PRESSURE: 153 MMHG | RESPIRATION RATE: 16 BRPM | BODY MASS INDEX: 44.9 KG/M2 | WEIGHT: 263 LBS

## 2022-11-09 DIAGNOSIS — Z87.19 HISTORY OF BARRETT'S ESOPHAGUS: ICD-10-CM

## 2022-11-09 DIAGNOSIS — R19.7 DIARRHEA, UNSPECIFIED TYPE: ICD-10-CM

## 2022-11-09 PROCEDURE — 88342 IMHCHEM/IMCYTCHM 1ST ANTB: CPT

## 2022-11-09 PROCEDURE — 7100000010 HC PHASE II RECOVERY - FIRST 15 MIN: Performed by: INTERNAL MEDICINE

## 2022-11-09 PROCEDURE — 3609012400 HC EGD TRANSORAL BIOPSY SINGLE/MULTIPLE: Performed by: INTERNAL MEDICINE

## 2022-11-09 PROCEDURE — 88305 TISSUE EXAM BY PATHOLOGIST: CPT

## 2022-11-09 PROCEDURE — 2709999900 HC NON-CHARGEABLE SUPPLY: Performed by: INTERNAL MEDICINE

## 2022-11-09 PROCEDURE — 7100000011 HC PHASE II RECOVERY - ADDTL 15 MIN: Performed by: INTERNAL MEDICINE

## 2022-11-09 PROCEDURE — 6360000002 HC RX W HCPCS: Performed by: NURSE ANESTHETIST, CERTIFIED REGISTERED

## 2022-11-09 PROCEDURE — 2580000003 HC RX 258: Performed by: INTERNAL MEDICINE

## 2022-11-09 PROCEDURE — 3700000000 HC ANESTHESIA ATTENDED CARE: Performed by: INTERNAL MEDICINE

## 2022-11-09 PROCEDURE — 2500000003 HC RX 250 WO HCPCS: Performed by: NURSE ANESTHETIST, CERTIFIED REGISTERED

## 2022-11-09 PROCEDURE — 43239 EGD BIOPSY SINGLE/MULTIPLE: CPT | Performed by: INTERNAL MEDICINE

## 2022-11-09 PROCEDURE — 3609027000 HC COLONOSCOPY: Performed by: INTERNAL MEDICINE

## 2022-11-09 PROCEDURE — G0121 COLON CA SCRN NOT HI RSK IND: HCPCS | Performed by: INTERNAL MEDICINE

## 2022-11-09 PROCEDURE — 3700000001 HC ADD 15 MINUTES (ANESTHESIA): Performed by: INTERNAL MEDICINE

## 2022-11-09 RX ORDER — LIDOCAINE HYDROCHLORIDE 10 MG/ML
INJECTION, SOLUTION INFILTRATION; PERINEURAL PRN
Status: DISCONTINUED | OUTPATIENT
Start: 2022-11-09 | End: 2022-11-09 | Stop reason: SDUPTHER

## 2022-11-09 RX ORDER — GLYCOPYRROLATE 0.2 MG/ML
INJECTION INTRAMUSCULAR; INTRAVENOUS PRN
Status: DISCONTINUED | OUTPATIENT
Start: 2022-11-09 | End: 2022-11-09 | Stop reason: SDUPTHER

## 2022-11-09 RX ORDER — SODIUM CHLORIDE, SODIUM LACTATE, POTASSIUM CHLORIDE, CALCIUM CHLORIDE 600; 310; 30; 20 MG/100ML; MG/100ML; MG/100ML; MG/100ML
INJECTION, SOLUTION INTRAVENOUS CONTINUOUS
Status: DISCONTINUED | OUTPATIENT
Start: 2022-11-09 | End: 2022-11-09 | Stop reason: HOSPADM

## 2022-11-09 RX ORDER — PROPOFOL 10 MG/ML
INJECTION, EMULSION INTRAVENOUS PRN
Status: DISCONTINUED | OUTPATIENT
Start: 2022-11-09 | End: 2022-11-09 | Stop reason: SDUPTHER

## 2022-11-09 RX ORDER — FENTANYL CITRATE 50 UG/ML
INJECTION, SOLUTION INTRAMUSCULAR; INTRAVENOUS PRN
Status: DISCONTINUED | OUTPATIENT
Start: 2022-11-09 | End: 2022-11-09 | Stop reason: SDUPTHER

## 2022-11-09 RX ADMIN — LIDOCAINE HYDROCHLORIDE 40 MG: 10 INJECTION, SOLUTION INFILTRATION; PERINEURAL at 08:14

## 2022-11-09 RX ADMIN — SODIUM CHLORIDE, POTASSIUM CHLORIDE, SODIUM LACTATE AND CALCIUM CHLORIDE: 600; 310; 30; 20 INJECTION, SOLUTION INTRAVENOUS at 07:33

## 2022-11-09 RX ADMIN — GLYCOPYRROLATE 0.2 MG: 0.2 INJECTION INTRAMUSCULAR; INTRAVENOUS at 08:10

## 2022-11-09 RX ADMIN — FENTANYL CITRATE 50 MCG: 50 INJECTION INTRAMUSCULAR; INTRAVENOUS at 08:12

## 2022-11-09 RX ADMIN — FENTANYL CITRATE 50 MCG: 50 INJECTION INTRAMUSCULAR; INTRAVENOUS at 08:16

## 2022-11-09 RX ADMIN — PROPOFOL 250 MG: 10 INJECTION, EMULSION INTRAVENOUS at 08:14

## 2022-11-09 ASSESSMENT — PAIN - FUNCTIONAL ASSESSMENT
PAIN_FUNCTIONAL_ASSESSMENT: 0-10
PAIN_FUNCTIONAL_ASSESSMENT: 0-10

## 2022-11-09 ASSESSMENT — PAIN SCALES - GENERAL
PAINLEVEL_OUTOF10: 0
PAINLEVEL_OUTOF10: 0
PAINLEVEL_OUTOF10: 1

## 2022-11-09 ASSESSMENT — PAIN DESCRIPTION - DESCRIPTORS
DESCRIPTORS: SORE
DESCRIPTORS: SORE

## 2022-11-09 ASSESSMENT — PAIN DESCRIPTION - LOCATION: LOCATION: THROAT

## 2022-11-09 NOTE — DISCHARGE INSTRUCTIONS
RECOMMENDATIONS:    1. Await path results  2. Continue PPI daily  3. Repeat EGD in maximum of 3 yrs for Reza's surveillance. 4.  Repeat Colonoscopy in 5 yrs for screening/surveillance. POST-OP ORDERS: ENDOSCOPY & COLONOSCOPY:    1. Rest today. 2. DO NOT eat or drink until wide awake; eat your usual diet today in moderate amount only. 3. DO NOT drive today. 4. Call physician if you have severe pain, vomiting, fever, rectal bleeding or black bowel movements. 5.  If a biopsy was taken or a polyp removed, you should expect to hear results in about 21 days. If you have heard nothing from your physician by then, call the office for results. 6.  Discharge home when patient awake, vitals signs stable and tolerating liquids.

## 2022-11-09 NOTE — H&P
Patient Name: Delia Wynne  : 1955  MRN: [id number]  DATE: 22    Allergies: Allergies   Allergen Reactions    Neosporin [Neomycin-Polymyxin-Gramicidin]     Penicillins     Sulfa Antibiotics     Zithromax [Azithromycin]         ENDOSCOPY  History and Physical    Procedure:    [] Diagnostic Colonoscopy       [x] Screening Colonoscopy  [x] EGD      [] ERCP      [] EUS       [] Other    [x] Previous office notes/History and Physical reviewed from the patients chart. Please see EMR for further details of HPI. I have examined the patient's status immediately prior to the procedure and:      Indications/HPI:    []Abdominal Pain   [x]Barretts  [x]Screening/Surveillance   []History of Polyps  []Dysphagia            [] +Cologard/DNA testing  []Abnormal Imaging              []EOE Hx              [] Family Hx of CRC/Polyps  []Anemia                            []Food Impaction       []Recent Poor Prep  []GI Bleed             []Lymphadenopathy  []History of Polyps  []Change in bowel habits []Heartburn/Reflux  []Cancer- GI/Lung  []Chest Pain - Non Cardiac []Heme (+) Stool []Ulcers  []Constipation  []Hemoptysis  []Incontinence    []Diarrhea  []Hypoxemia  []Rectal Bleed (BRBPR)  []Nausea/Vomiting   [] Varices  []Crohns/Colitis  []Pancreatic Cyst   [] Cirrhosis   []Pancreatitis    []Abnormal MRCP  []Elevated LFT [] Stent Removal, Previous ERCP  []Other:     Anesthesia:   [x] MAC [] Moderate Sedation   [] General   [] None     ROS: 12 pt Review of Symptoms was negative unless mentioned above    Medications:   Prior to Admission medications    Medication Sig Start Date End Date Taking?  Authorizing Provider   bisoprolol (ZEBETA) 10 MG tablet Take 10 mg by mouth daily 22   Historical Provider, MD   escitalopram (LEXAPRO) 10 MG tablet Take 10 mg by mouth daily    Historical Provider, MD   polycarbophil (FIBERCON) 625 MG tablet Take 2 tablets by mouth every evening    Historical Provider, MD   levothyroxine (SYNTHROID) 50 MCG tablet Take 50 mcg by mouth Daily    Historical Provider, MD   omeprazole (PRILOSEC) 20 MG delayed release capsule Take 20 mg by mouth daily    Historical Provider, MD   ezetimibe (ZETIA) 10 MG tablet Take 10 mg by mouth daily    Historical Provider, MD   Cholecalciferol (VITAMIN D3) 2000 UNITS CAPS Take by mouth    Historical Provider, MD       Past Medical History:  Past Medical History:   Diagnosis Date    Acid reflux     Reza's esophageal ulceration     Breast cancer ([address] 2008    Dysplasia of cervix     Hyperlipidemia     Hypertension     Thyroid disease        Past Surgical History:  Past Surgical History:   Procedure Laterality Date    BREAST RECONSTRUCTION  2010    BREAST RECONSTRUCTION  2011    BREAST RECONSTRUCTION  2011    BREAST SURGERY Right     implant changed    CHOLECYSTECTOMY  2006    COLONOSCOPY  12/2011    Pt will see Dr. Kassandra Coronado 2017    Rishi Iraheta  2014    HYSTERECTOMY ([address])  [address] [place] with retained ovaries    MASTECTOMY, BILATERAL  2008    OTHER SURGICAL HISTORY  2006    Sphincter ODODDI    [place] COLONOSCOPY FLX DX W/COLLJ SPEC WHEN PFRMD N/A 04/26/2017    Dr Mart-diverticulosis, 5 yr recall    [place] EGD TRANSORAL BIOPSY SINGLE/MULTIPLE N/A 04/11/2018    Dr Kassandra Coronado (+) Reza's (-) dysplasia-Zuri (-)--1 yr recall    UPPER GASTROINTESTINAL ENDOSCOPY N/A 05/30/2019    Dr Vicenta Beatty-Gastropathy, (+) Reza's, (-) dysplasia--1 yr recall       Social History:  Social History     Tobacco Use    Smoking status: Never    Smokeless tobacco: Never   Vaping Use    Vaping Use: Never used   Substance Use Topics    Alcohol use: Yes     Comment: rarely    Drug use: No       Vital Signs:   Vitals:    11/09/22 0715   BP: 138/67   Pulse: 55   Resp: 22   Temp: 97.5 °F (36.4 °C)   SpO2: 97%        Physical Exam:  Cardiac:  [x]WNL  []Comments:  Pulmonary:  [x]WNL   []Comments:  Neuro/Mental Status:  [x]WNL  []Comments:  Abdominal:  [x]WNL    []Comments:  Other:   []WNL []Comments:    Informed Consent:  The risks and benefits of the procedure have been discussed with either the patient or if they cannot consent, their representative. Assessment:  Patient examined and appropriate for planned sedation and procedure. Plan:  Proceed with planned sedation and procedure as above.          Feli Miller MD

## 2022-11-09 NOTE — ANESTHESIA PRE PROCEDURE
Department of Anesthesiology  Preprocedure Note       Name:  Tahir August   Age:  74 y.o.  :  1955                                          MRN:  [id number]         Date:  2022      Surgeon: Siria Lyons):  Mickey Avelar MD    Procedure: Procedure(s):  EGD BIOPSY    Medications prior to admission:   Prior to Admission medications    Medication Sig Start Date End Date Taking? Authorizing Provider   bisoprolol (ZEBETA) 10 MG tablet Take 10 mg by mouth daily 22   Historical Provider, MD   escitalopram (LEXAPRO) 10 MG tablet Take 10 mg by mouth daily    Historical Provider, MD   polycarbophil (FIBERCON) 625 MG tablet Take 2 tablets by mouth every evening    Historical Provider, MD   levothyroxine (SYNTHROID) 50 MCG tablet Take 50 mcg by mouth Daily    Historical Provider, MD   omeprazole (PRILOSEC) 20 MG delayed release capsule Take 20 mg by mouth daily    Historical Provider, MD   ezetimibe (ZETIA) 10 MG tablet Take 10 mg by mouth daily    Historical Provider, MD   Cholecalciferol (VITAMIN D3) 2000 UNITS CAPS Take by mouth    Historical Provider, MD       Current medications:    No current facility-administered medications for this visit. No current outpatient medications on file. Facility-Administered Medications Ordered in Other Visits   Medication Dose Route Frequency Provider Last Rate Last Admin    lactated ringers infusion   IntraVENous Continuous Mickey Avelar  mL/hr at 22 0733 New Bag at 22 0733       Allergies:     Allergies   Allergen Reactions    Neosporin [Neomycin-Polymyxin-Gramicidin]     Penicillins     Sulfa Antibiotics     Zithromax [Azithromycin]        Problem List:    Patient Active Problem List   Diagnosis Code    LUQ abdominal pain R10.12    Chronic GERD K21.9    Reza's esophagus without dysplasia K22.70    RUQ abdominal pain R10.11    Personal history of breast cancer Z85.3    S/P bilateral mastectomy  with implant reconstruction on the right and pedicle TRAM on the left Z90.13    Lymphedema of left arm I89.0       Past Medical History:        Diagnosis Date    Acid reflux     Reza's esophageal ulceration     Breast cancer ([address] 2008    Dysplasia of cervix     Hyperlipidemia     Hypertension     Thyroid disease        Past Surgical History:        Procedure Laterality Date    BREAST RECONSTRUCTION  2010    BREAST RECONSTRUCTION  2011    BREAST RECONSTRUCTION  2011    BREAST SURGERY Right     implant changed    CHOLECYSTECTOMY  2006    COLONOSCOPY  12/2011    Pt will see Dr. Temitope Victor 2017    GYNECOLOGIC CRYOSURGERY  2014    HYSTERECTOMY (CERVIX STATUS UNKNOWN)  1996    TVH with retained ovaries    MASTECTOMY, BILATERAL  2008    OTHER SURGICAL HISTORY  2006    Sphincter ODODDI    [place] COLONOSCOPY FLX DX W/COLLJ SPEC WHEN PFRMD N/A 04/26/2017    Dr Mart-diverticulosis, 5 yr recall    [place] EGD TRANSORAL BIOPSY SINGLE/MULTIPLE N/A 04/11/2018    Dr Temitope Victor (+) Reza's (-) dysplasia-Zuri (-)--1 yr recall    UPPER GASTROINTESTINAL ENDOSCOPY N/A 05/30/2019    Dr Roberto Beatty-Gastropathy, (+) Reza's, (-) dysplasia--1 yr recall       Social History:    Social History     Tobacco Use    Smoking status: Never    Smokeless tobacco: Never   Substance Use Topics    Alcohol use: Yes     Comment: rarely                                Counseling given: Not Answered      Vital Signs (Current): There were no vitals filed for this visit.                                            BP Readings from Last 3 Encounters:   11/09/22 138/67   09/28/22 120/80   08/01/22 128/72       NPO Status:                                                                                 BMI:   Wt Readings from Last 3 Encounters:   11/09/22 263 lb (119.3 kg)   09/28/22 263 lb (119.3 kg)   08/01/22 262 lb (118.8 kg)     There is no height or weight on file to calculate BMI.    CBC:   Lab Results   Component Value Date/Time    WBC 9.03 02/25/2022 10:07 AM    RBC 4.76 02/25/2022 10:07 AM    HGB 14.0 02/25/2022 10:07 AM    HCT 41.7 02/25/2022 10:07 AM    MCV 87.6 02/25/2022 10:07 AM    RDW 13.5 02/25/2022 10:07 AM     02/25/2022 10:07 AM       CMP:   Lab Results   Component Value Date/Time    CREATININE 0.9 07/24/2019 09:44 AM    LABGLOM >60 07/24/2019 09:44 AM    PROT 6.8 09/28/2022 12:17 PM    BILITOT 1.0 09/28/2022 12:17 PM    ALKPHOS 73 09/28/2022 12:17 PM    AST 14 09/28/2022 12:17 PM    ALT 15 09/28/2022 12:17 PM       POC Tests: No results for input(s): POCGLU, POCNA, POCK, POCCL, POCBUN, POCHEMO, POCHCT in the last 72 hours. Coags: No results found for: PROTIME, INR, APTT    HCG (If Applicable): No results found for: PREGTESTUR, PREGSERUM, HCG, HCGQUANT     ABGs: No results found for: PHART, PO2ART, BHM0EJG, FUU4HKV, BEART, C6YCNOQX     Type & Screen (If Applicable):  No results found for: LABABO, [address]    Anesthesia Evaluation  Patient summary reviewed and Nursing notes reviewed no history of anesthetic complications:   Airway: Mallampati: II  TM distance: >3 FB   Neck ROM: full  Mouth opening: > = 3 FB   Dental: normal exam         Pulmonary:Negative Pulmonary ROS and normal exam                               Cardiovascular:  Exercise tolerance: no interval change,   (+) hypertension: moderate, hyperlipidemia         Beta Blocker:  Not on Beta Blocker         Neuro/Psych:   (+) depression/anxiety             GI/Hepatic/Renal:   (+) GERD: well controlled, renal disease: kidney stones, bowel prep, morbid obesity (BMI 45)         ROS comment: Reza's esophagus  etoh use. Endo/Other:    (+) hypothyroidism::., malignancy/cancer (left breast cancer with bilat mastectomy with chemo and radiation in 2008). Pt had no PAT visit       Abdominal:   (+) obese,           Vascular: negative vascular ROS. Other Findings:             Anesthesia Plan      general and TIVA     ASA 3       Induction: intravenous.       Anesthetic plan and risks discussed with patient.                         Lamin Winkler, APRN - CRNA   11/9/2022

## 2022-11-09 NOTE — OP NOTE
Endoscopic Procedure Note    Patient: Avery Ruby : 1955  Med Rec#: [id number] Acc#: [id number]     Primary Care Provider RAFAEL An - CNP  Referring Provider:     Endoscopist: Marline Jefferson MD    Date of Procedure:  2022    Procedure:   1. EGD with biopsy    Indications:   1. History of Reza's Esophagus   2. Dyspepsia     Anesthesia:  Sedation was administered by anesthesia who monitored the patient during the procedure. Estimated Blood Loss: minimal    Procedure:   After reviewing the patient's chart and obtaining informed consent, the patient was placed in the left lateral decubitus position. A forward-viewing Olympus endoscope was lubricated and inserted through the mouth into the oropharynx. Under direct visualization, the upper esophagus was intubated. The scope was advanced to the level of the third portion of duodenum. Scope was slowly withdrawn with careful inspection of the mucosal surfaces. The scope was retroflexed for inspection of the gastric fundus and incisura. Findings and maneuvers are listed in impression below. The patient tolerated the procedure well. The scope was removed. There were no immediate complications. Findings:   Esophagus: abnormal: in the distal esophagus there are mucosal changes of Reza's esophagus - biopsied   There is a small hiatal hernia present. Stomach:  abnormal: mild mucosal changes suggestive of gastritis noted -  Gastric biopsies were taken from the antrum and body to rule out Helicobacter pylori infection. Duodenum: normal      IMPRESSION:  1. Reza's esophagus - biopsied        RECOMMENDATIONS:    1. Await path results  2. Continue PPI daily  3. Repeat EGD in maximum of 3 yrs for Reza's surveillance. The results were discussed with the patient and family. A copy of the images obtained were given to the patient.      Haleigh Goode MD  2022  8:34 AM

## 2022-11-09 NOTE — ANESTHESIA POSTPROCEDURE EVALUATION
Department of Anesthesiology  Postprocedure Note    Patient: Dia Olmos  MRN: [id number]  YOB: 1955  Date of evaluation: 11/9/2022      Procedure Summary     Date: 11/09/22 Room / Location: [address]    Anesthesia Start: 2484 Anesthesia Stop:     Procedures:       EGD BIOPSY (Abdomen)      COLONOSCOPY DIAGNOSTIC Diagnosis:       History of Reza's esophagus      Diarrhea, unspecified type      (Mitch Seth,)    Surgeons: Juany Lopez MD Responsible Provider: RAFAEL Hong CRNA    Anesthesia Type: general, TIVA ASA Status: 3          Anesthesia Type: No value filed.     Antonina Phase I: Antonina Score: 10    Antonina Phase II:        Anesthesia Post Evaluation    Patient location during evaluation: bedside  Patient participation: complete - patient participated  Level of consciousness: sleepy but conscious  Pain score: 0  Airway patency: patent  Nausea & Vomiting: no nausea and no vomiting  Complications: no  Cardiovascular status: hemodynamically stable and blood pressure returned to baseline  Respiratory status: acceptable and nasal cannula  Hydration status: stable

## 2022-11-09 NOTE — OP NOTE
Patient: Blanca Jesus : 1955  Med Rec#: [id number] Acc#: [id number]   Primary Care Provider PhilRAFAEL Cox CNP    Date of Procedure:  2022    Endoscopist: Konrad Robbins MD    Referring Provider: RAFAEL Rodriguez CNP,     Operation Performed: Colonoscopy     Indications: screening, patient denies lower GI symptoms to me today. Anesthesia:  Sedation was administered by anesthesia who monitored the patient during the procedure. I met with Blanca Jesus prior to procedure. We discussed the procedure itself, and I have discussed the risks of endoscopy (including-- but not limited to-- pain, discomfort, bleeding potentially requiring second endoscopic procedure and/or blood transfusion, organ perforation requiring operative repair, damage to organs near the colon, infection, aspiration, cardiopulmonary/allergic reaction), benefits, indications to endoscopy. Additionally, we discussed options other than colonoscopy. The patient expressed understanding. All questions answered. The patient decided to proceed with the procedure. Signed informed consent was placed on the chart. Blood Loss: minimal    Withdrawal time: 6 minutes  Bowel Prep: adequate     Complications: no immediate complications    DESCRIPTION OF PROCEDURE:     A time out was performed. After written informed consent was obtained, the patient was placed in the left lateral position. The perianal area was inspected, and a digital rectal exam was performed. A rectal exam was performed: normal tone, no palpable lesions. At this point, a forward viewing Olympus colonoscope was inserted into the anus and carefully advanced to the cecum. The cecum was identified by the ileocecal valve and the appendiceal orifice. The colonoscope was then slowly withdrawn with careful inspection of the mucosa in a linear and circumferential fashion. The scope was retroflexed in the rectum.  Suction was utilized during the procedure to remove as much air as possible from the bowel. The colonoscope was removed from the patient, and the procedure was terminated. Findings are listed below. Findings: The mucosa appeared normal throughout the entire examined colon     There was evidence of diverticular disease throughout the left colon. Retroflexion in the rectum was normal and revealed no further abnormalities         Recommendations:  1. Repeat colonoscopy - 5 yrs for screening      Findings and recommendations were discussed w/ the patient. A copy of the images was provided.     Julian Reddy MD  11/9/2022  8:37 AM

## 2022-12-09 ENCOUNTER — OFFICE VISIT (OUTPATIENT)
Dept: OBGYN CLINIC | Age: 67
End: 2022-12-09
Payer: MEDICARE

## 2022-12-09 VITALS
SYSTOLIC BLOOD PRESSURE: 132 MMHG | HEIGHT: 64 IN | HEART RATE: 60 BPM | DIASTOLIC BLOOD PRESSURE: 77 MMHG | WEIGHT: 262 LBS | BODY MASS INDEX: 44.73 KG/M2

## 2022-12-09 DIAGNOSIS — N95.2 ATROPHIC VAGINITIS: Primary | ICD-10-CM

## 2022-12-09 DIAGNOSIS — L29.2 VULVAR ITCHING: ICD-10-CM

## 2022-12-09 PROCEDURE — 99213 OFFICE O/P EST LOW 20 MIN: CPT | Performed by: NURSE PRACTITIONER

## 2022-12-09 PROCEDURE — 1123F ACP DISCUSS/DSCN MKR DOCD: CPT | Performed by: NURSE PRACTITIONER

## 2022-12-09 RX ORDER — LANOLIN ALCOHOL/MO/W.PET/CERES
325 CREAM (GRAM) TOPICAL DAILY
COMMUNITY

## 2022-12-09 RX ORDER — CLOBETASOL PROPIONATE 0.5 MG/G
OINTMENT TOPICAL
Qty: 1 EACH | Refills: 1 | Status: SHIPPED | OUTPATIENT
Start: 2022-12-09

## 2022-12-09 ASSESSMENT — ENCOUNTER SYMPTOMS
EYES NEGATIVE: 1
GASTROINTESTINAL NEGATIVE: 1
RESPIRATORY NEGATIVE: 1
ALLERGIC/IMMUNOLOGIC NEGATIVE: 1

## 2022-12-09 NOTE — PROGRESS NOTES
Manuel Landeros is a 74 y.o. female who presents today for her medical conditions/ complaints as noted below. Manuel Landeros is c/o of Annual Exam        HPI  Pt presents today for vaginal check for itching and irritation. Uses steroid cream prn and nystatin cream as well. Can use prn now. Much better than when I had seen her in past.   Had pap over summer, normal    Last mammogram:  double mastectomy   Last pap smear:  06/2022  Contraception:   hyst  Last bone density:  2019  Last colonoscopy:11/2022    No LMP recorded. Patient has had a hysterectomy. No obstetric history on file.     Past Medical History:   Diagnosis Date    Acid reflux     Reza's esophageal ulceration     Breast cancer ([facility] [address] 2008    Dysplasia of cervix     Hyperlipidemia     Hypertension     Thyroid disease      Past Surgical History:   Procedure Laterality Date    BREAST RECONSTRUCTION  2010    BREAST RECONSTRUCTION  2011    BREAST RECONSTRUCTION  2011    BREAST SURGERY Right     implant changed    CHOLECYSTECTOMY  2006    COLONOSCOPY  12/2011    Pt will see Dr. Flori Rangel 2017    COLONOSCOPY N/A 11/09/2022    Dr VIRGINIE Beatty-Diverticular disease, 5 yr recall    GYNECOLOGIC CRYOSURGERY  2014    HYSTERECTOMY (CERVIX STATUS UNKNOWN)  1996    TVH with retained ovaries    MASTECTOMY, BILATERAL  2008    OTHER SURGICAL HISTORY  2006    Sphincter ODODDI    MN COLONOSCOPY FLX DX W/COLLJ SPEC WHEN PFRMD N/A 04/26/2017    Dr Mathis Koyanagi, 5 yr recall    MN EGD TRANSORAL BIOPSY SINGLE/MULTIPLE N/A 04/11/2018    Dr Flori Rangel (+) Reza's (-) dysplasia-Zuri (-)--1 yr recall    UPPER GASTROINTESTINAL ENDOSCOPY N/A 05/30/2019    Dr Darius Beatty-Gastropathy, (+) Reza's, (-) dysplasia--1 yr recall    UPPER GASTROINTESTINAL ENDOSCOPY N/A 11/09/2022    Dr Sabra Sharif (+) Reza's, gastritis, 3 yr recall     Family History   Problem Relation Age of Onset    Cancer Father 36        Lymphoma    Stroke Maternal Grandfather     Colon Cancer Neg Hx     Colon Polyps Neg Hx Liver Cancer Neg Hx     Liver Disease Neg Hx     Esophageal Cancer Neg Hx     Rectal Cancer Neg Hx     Stomach Cancer Neg Hx      Social History     Tobacco Use    Smoking status: Never    Smokeless tobacco: Never   Substance Use Topics    Alcohol use: Yes     Comment: rarely       Current Outpatient Medications   Medication Sig Dispense Refill    ferrous sulfate (FE TABS 325) 325 (65 Fe) MG EC tablet Take 325 mg by mouth daily      clobetasol (TEMOVATE) 0.05 % ointment Apply topically 2 times daily. 1 each 1    bisoprolol (ZEBETA) 10 MG tablet Take 10 mg by mouth daily      escitalopram (LEXAPRO) 10 MG tablet Take 10 mg by mouth daily      polycarbophil (FIBERCON) 625 MG tablet Take 2 tablets by mouth every evening      levothyroxine (SYNTHROID) 50 MCG tablet Take 50 mcg by mouth Daily      omeprazole (PRILOSEC) 20 MG delayed release capsule Take 20 mg by mouth daily      ezetimibe (ZETIA) 10 MG tablet Take 10 mg by mouth daily      Cholecalciferol (VITAMIN D3) 2000 UNITS CAPS Take by mouth       No current facility-administered medications for this visit. Allergies   Allergen Reactions    Neosporin [Neomycin-Polymyxin-Gramicidin]     Penicillins     Sulfa Antibiotics     Zithromax [Azithromycin]      Vitals:    12/09/22 1057   BP: 132/77   Pulse: 60     Body mass index is 44.97 kg/m². Review of Systems   Constitutional: Negative. HENT: Negative. Eyes: Negative. Respiratory: Negative. Cardiovascular: Negative. Gastrointestinal: Negative. Endocrine: Negative. Genitourinary: Negative. Negative for difficulty urinating, dyspareunia, dysuria, enuresis, frequency, hematuria, menstrual problem, pelvic pain, urgency and vaginal discharge. Itching to vulva prn   Musculoskeletal: Negative. Skin: Negative. Allergic/Immunologic: Negative. Neurological: Negative. Hematological: Negative. Psychiatric/Behavioral: Negative.          Physical Exam  Vitals and nursing note reviewed. Constitutional:       General: She is not in acute distress. Appearance: She is well-developed. She is not diaphoretic. HENT:      Head: Normocephalic and atraumatic. Eyes:      Conjunctiva/sclera: Conjunctivae normal.      Pupils: Pupils are equal, round, and reactive to light. Pulmonary:      Effort: Pulmonary effort is normal.   Abdominal:      Tenderness: There is no guarding. Genitourinary:     Comments: Folds to left groin moist and pink/yeasty. Vulva normal, healthy tissue  Musculoskeletal:         General: Normal range of motion. Cervical back: Normal range of motion. Comments: Normal ROM in all 4 extremities; normal gait   Skin:     General: Skin is warm and dry. Neurological:      Mental Status: She is alert and oriented to person, place, and time. Motor: No abnormal muscle tone. Coordination: Coordination normal.   Psychiatric:         Behavior: Behavior normal.        Diagnosis Orders   1. Atrophic vaginitis  clobetasol (TEMOVATE) 0.05 % ointment      2. Vulvar itching  clobetasol (TEMOVATE) 0.05 % ointment          MEDICATIONS:  Orders Placed This Encounter   Medications    clobetasol (TEMOVATE) 0.05 % ointment     Sig: Apply topically 2 times daily. Dispense:  1 each     Refill:  1       ORDERS:  No orders of the defined types were placed in this encounter. PLAN:  Refills made. Use prn  To stop nystatin cream to groins, keeping too moist. Instead gold bond or cornstarch powder. Call if wants nystatin powder. Thanks! There are no Patient Instructions on file for this visit.

## 2023-02-24 NOTE — PROGRESS NOTES
MEDICAL ONCOLOGY PROGRESS NOTE    Pt Name: Becky Eli  MRN: [id number]  YOB: 1955  Date of evaluation: 3/1/2023      HISTORY OF PRESENT ILLNESS:    Patient presents today for scheduled follow-up visit. Breast cancer. She had bilateral mastectomy reconstruction. She has significantly left upper extremity lymphedema. She is requesting a referral to the lymphedema clinic today. She has been followed by GI and had a repeat upper EGD. No evidence of Reza's esophagus. Diagnosis  Stage II high-grade invasive ductal carcinoma of left breast, February 2008  ER negative/FL negative, HER-2 positive  Chronic lymphedema    Treatment summary  2008-neoadjuvant chemotherapy with Carboplatin, Taxotere and Herceptin  07/23/2008-bilateral mastectomy  2008-adjuvant breast XRT  2010- implants and reconstruction  03/2021- Lymphedema Surgery (lymphovenous anastomosis)    Jacqui Jewell was first seen by me on 4/2/2021. Patient has a personal history of stage II HER-2 positive/ER negative left breast invasive ductal carcinoma. She underwent neoadjuvant chemotherapy with Taxotere, carboplatin and Herceptin. She underwent a bilateral mastectomy 2008. Apparently had a complete pathologic response. 17 lymph nodes removed. She had implants placed in 2010. She has been struggling with chronic lymphedema. 2/13/2008- Us-Guided Left Breast and Left Axillary Lymph Node Biopsy Invasive ductal carcinoma, high-grade. Lymph Node, Left Axilla, Biopsy: Ductal carcinoma, metastatic.    3/6/2008- Cancer Markers CA 15-3 = 12, CA 27.29 = 16  3/6/2008- Pet Ct Skull Base to Mid Thigh Abnormal PET CT imaging study documenting left axillary lymphadenopathy as was well and is 2 lesions in the upper-outer quadrant of the left breast  2008-neoadjuvant chemotherapy with Carboplatin, Taxotere and Herceptin  7/8/2008- Mri Breast Bilateral No definitive evidence of malignancy   7/23/2008- Bilateral Mastectomy and Axillary Lymphadenectomy No residual invasive ductal carcinoma identified. 17 lymph nodes removed. 2008- adjuvant radiation therapy  2010- Breast Implants and Reconstruction  03/2021- Lymphedema Surgery (lymphovenous anastomosis)  10/7/22 US Gallbladder (B[facility]) No acute abnormality is identified, there is mild fatty infiltration of liver. Previous cholecystectomy is noted. 11/9/22 Upper EGD by Violette Moore at [facility]:  Stomach, random gastric biopsies:   Benign gastric mucosa with minimal chronic inflammation. No Helicobacter pylori organisms identified by immunohistochemical stain. Esophagus, distal esophageal biopsy:  Junctional type gastroesophageal mucosa with mild chronic inflammation and reactive epithelial changes with intestinal metaplasia, compatible with Reza's esophagus, negative for evidence of dysplasia. Changes consistent with reflux esophagitis.        Past Medical History:    Past Medical History:   Diagnosis Date    Acid reflux     Reza's esophageal ulceration     Breast cancer ([address] 2008    Dysplasia of cervix     Hyperlipidemia     Hypertension     Thyroid disease        Past Surgical History:    Past Surgical History:   Procedure Laterality Date    BREAST RECONSTRUCTION  2010    BREAST RECONSTRUCTION  2011    BREAST RECONSTRUCTION  2011    BREAST SURGERY Right     implant changed    CHOLECYSTECTOMY  2006    COLONOSCOPY  12/2011    Pt will see Dr. Dee Cross 2017    COLONOSCOPY N/A 11/09/2022    Dr VIRGINIE Beatty-Diverticular disease, 5 yr recall    GYNECOLOGIC CRYOSURGERY  2014    HYSTERECTOMY ([address])  1996    TVH with retained ovaries    MASTECTOMY, BILATERAL  2008    OTHER SURGICAL HISTORY  2006    Sphincter ODODDI    AL COLONOSCOPY FLX DX W/COLLJ SPEC WHEN PFRMD N/A 04/26/2017    Dr Hayley Dunne, 5 yr recall    AL EGD TRANSORAL BIOPSY SINGLE/MULTIPLE N/A 04/11/2018    Dr Dee Cross (+) Reza's (-) dysplasia-Zuri (-)--1 yr recall    UPPER GASTROINTESTINAL ENDOSCOPY N/A 05/30/2019    Dr Teri Moreno, (+) Reza's, (-) dysplasia--1 yr recall    UPPER GASTROINTESTINAL ENDOSCOPY N/A 11/09/2022    Dr Nicole Yu (+) Reza's, gastritis, 3 yr recall       Social History:    Marital status, children: , 2 children  Smoking status: Never smoker  ETOH status: One Manuela Hyde monthly  Resides: [place], [place]    Family History:   Family History   Problem Relation Age of Onset    Cancer Father 36        Lymphoma    Stroke Maternal Grandfather     Colon Cancer Neg Hx     Colon Polyps Neg Hx     Liver Cancer Neg Hx     Liver Disease Neg Hx     Esophageal Cancer Neg Hx     Rectal Cancer Neg Hx     Stomach Cancer Neg Hx        Current Hospital Medications:    Current Outpatient Medications   Medication Sig Dispense Refill    ferrous sulfate (FE TABS 325) 325 (65 Fe) MG EC tablet Take 325 mg by mouth daily      bisoprolol (ZEBETA) 10 MG tablet Take 10 mg by mouth daily      escitalopram (LEXAPRO) 10 MG tablet Take 10 mg by mouth daily      polycarbophil (FIBERCON) 625 MG tablet Take 2 tablets by mouth every evening      levothyroxine (SYNTHROID) 50 MCG tablet Take 50 mcg by mouth Daily      omeprazole (PRILOSEC) 20 MG delayed release capsule Take 20 mg by mouth daily      ezetimibe (ZETIA) 10 MG tablet Take 10 mg by mouth daily      Cholecalciferol (VITAMIN D3) 2000 UNITS CAPS Take by mouth       No current facility-administered medications for this visit. Allergies:    Allergies   Allergen Reactions    Neosporin [Neomycin-Polymyxin-Gramicidin]     Penicillins     Sulfa Antibiotics     Zithromax [Azithromycin]          Subjective   REVIEW OF SYSTEMS:   CONSTITUTIONAL: no fever, no night sweats, no fatigue;  HEENT: no blurring of vision, no double vision, no hearing difficulty, no tinnitus, no ulceration, no dysplasia, no epistaxis;   LUNGS: no cough, no hemoptysis, no wheeze,  no shortness of breath;  CARDIOVASCULAR: no palpitation, no chest pain, no shortness of breath;  GI: no abdominal pain, no nausea, no vomiting, no diarrhea, no constipation;  JORJE: no dysuria, no hematuria, no frequency or urgency, no nephrolithiasis;  MUSCULOSKELETAL: no joint pain, no swelling, no stiffness; left upper extremity swelling  ENDOCRINE: no polyuria, no polydipsia, no cold or heat intolerance;  HEMATOLOGY: no easy bruising or bleeding, no history of clotting disorder;  DERMATOLOGY: no skin rash, no eczema, no pruritus;  PSYCHIATRY: no depression, no anxiety, no panic attacks, no suicidal ideation, no homicidal ideation;  NEUROLOGY: no syncope, no seizures, no numbness or tingling of hands, no numbness or tingling of feet, no paresis;     Objective   /72 (Site: Left Lower Arm, Position: Sitting, Cuff Size: Medium Adult)   Pulse 62   Ht 5' 4\" (1.626 m)   Wt 270 lb 4.8 oz (122.6 kg)   SpO2 96%   BMI 46.40 kg/m²     PHYSICAL EXAM:  CONSTITUTIONAL: Alert, appropriate, no acute distress  EYES: Non icteric, EOM intact, pupils equal round   ENT: Mucus membranes moist, no oral pharyngeal lesions, external inspection of ears and nose are normal.  NECK: Supple, no masses. No palpable thyroid mass  CHEST/LUNGS: CTA bilaterally, normal respiratory effort   BREAST: Chaperoned breast exam with Marley Thomas RN. Unremarkable breast exam of the bilateral reconstructed breast.  No palpable regional adenopathy. CARDIOVASCULAR: RRR, no murmurs. No lower extremity edema  ABDOMEN: soft non-tender, active bowel sounds, no HSM. No palpable masses  EXTREMITIES: warm, full ROM in all 4 extremities, no focal weakness. Significant left lower extremity lymphedema. SKIN: warm, dry with no rashes or lesions  LYMPH: No cervical, clavicular, axillary, or inguinal lymphadenopathy  NEUROLOGIC: follows commands, non focal   PSYCH: mood and affect appropriate.   Alert and oriented to time, place, person    LABORATORY RESULTS REVIEWED/ANALYZED BY ME:  11/9/22 Upper EGD by Anil Velez at hospitals:  Stomach, random gastric biopsies:   Benign gastric mucosa with minimal chronic inflammation. No Helicobacter pylori organisms identified by immunohistochemical stain. Esophagus, distal esophageal biopsy:  Junctional type gastroesophageal mucosa with mild chronic inflammation and reactive epithelial changes with intestinal metaplasia, compatible with Reza's esophagus, negative for evidence of dysplasia. Changes consistent with reflux esophagitis. Lab Results   Component Value Date    WBC 7.65 03/01/2023    HGB 13.8 03/01/2023    HCT 44.7 03/01/2023    MCV 90.7 03/01/2023     03/01/2023     Lab Results   Component Value Date    NEUTROABS 5.05 03/01/2023     RADIOLOGY STUDIES REPORT/REVIEWED BY ME:  10/7/22 US Gallbladder ([facility]) No acute abnormality is identified, there is mild fatty infiltration of liver. Previous cholecystectomy is noted. ASSESSMENT:  #History of Breast Cancer Her positive  Patient has a history of ER/VT negative, HER2 positive IDC of left breast. She received neoadjuvant taxotere/carbo/herceptin. Bilateral mastectomy showed complete response. She had adjuvant radiation therapy. She has had no evidence of disease recurrence. Breast exam: Unremarkable reconstructed breasts bilaterally.   -Continue annual physical exam.    #Chronic Lymphedema  Patient was referred to Dr. Khushbu Giraldo at [facility] by Dr. Krista Polanco. She is status/post lymphovenous bypasses to the left upper extremity. She reports 75% improvement in baseline symptoms.   -3/1/23 Refer to [facility] Lymphedema clinic for evaluation    #Healthcare Maintenance  Last Mammogram: 2008 at [facility]  Last Pap Smear: 2019 at [facility] Colonoscopy: 2017 at [facility] Endoscopy: 2019 at [address] [place] [address]:  RTC with MD 1 year  Refer to [facility] Lymphedema clinic for evaluation    IAren am pre charting  as Medical Assistant for Kalee Kruse MD. Electronically signed by Aren Daniels MA on 3/1/2023 at 8:46 AM CST.     Ramon Fields am scribing for Romina Laboy MD. Electronically signed by Rabia Che RN on 3/1/2023 at 9:39 AM CST. I, Dr Lauren Wright, personally performed the services described in this documentation as scribed by Rabia Che RN in my presence and is both accurate and complete. I have seen, examined and reviewed this patient medication list, appropriate labs and imaging studies. I reviewed relevant medical records and others physicians notes. I discussed the plans of care with the patient. I answered all the questions to the patients satisfaction. I have also reviewed the chief complaint (CC) and part of the history (History of Present Illness (HPI), Past Family Social History [facility]), or Review of Systems (ROS) and made changes when appropriated.        (Please note that portions of this note were completed with a voice recognition program. Efforts were made to edit the dictations but occasionally words are mis-transcribed.)  Electronically signed by Romina Laboy MD on 3/1/2023 at 9:52 AM

## 2023-02-27 DIAGNOSIS — Z85.3 PERSONAL HISTORY OF BREAST CANCER: Primary | ICD-10-CM

## 2023-03-01 ENCOUNTER — HOSPITAL ENCOUNTER (OUTPATIENT)
Dept: INFUSION THERAPY | Age: 68
Discharge: HOME | End: 2023-03-01
Payer: MEDICARE

## 2023-03-01 ENCOUNTER — OFFICE VISIT (OUTPATIENT)
Dept: HEMATOLOGY | Age: 68
End: 2023-03-01
Payer: MEDICARE

## 2023-03-01 VITALS
OXYGEN SATURATION: 96 % | DIASTOLIC BLOOD PRESSURE: 72 MMHG | HEIGHT: 64 IN | SYSTOLIC BLOOD PRESSURE: 128 MMHG | HEART RATE: 62 BPM | WEIGHT: 270.3 LBS | BODY MASS INDEX: 46.15 KG/M2

## 2023-03-01 DIAGNOSIS — Z85.3 PERSONAL HISTORY OF BREAST CANCER: ICD-10-CM

## 2023-03-01 DIAGNOSIS — Z85.3 ENCOUNTER FOR FOLLOW-UP SURVEILLANCE OF BREAST CANCER: ICD-10-CM

## 2023-03-01 DIAGNOSIS — C50.912 INVASIVE DUCTAL CARCINOMA OF BREAST, LEFT (HCC): Primary | ICD-10-CM

## 2023-03-01 DIAGNOSIS — Z71.89 CARE PLAN DISCUSSED WITH PATIENT: ICD-10-CM

## 2023-03-01 DIAGNOSIS — I89.0 LYMPHEDEMA: ICD-10-CM

## 2023-03-01 DIAGNOSIS — Z08 ENCOUNTER FOR FOLLOW-UP SURVEILLANCE OF BREAST CANCER: ICD-10-CM

## 2023-03-01 LAB
BASOPHILS ABSOLUTE: 0.06 K/UL (ref 0.01–0.08)
BASOPHILS RELATIVE PERCENT: 0.8 % (ref 0.1–1.2)
EOSINOPHILS ABSOLUTE: 0.19 K/UL (ref 0.04–0.54)
EOSINOPHILS RELATIVE PERCENT: 2.5 % (ref 0.7–7)
HCT VFR BLD CALC: 44.7 % (ref 34.1–44.9)
HEMOGLOBIN: 13.8 G/DL (ref 11.2–15.7)
LYMPHOCYTES ABSOLUTE: 1.73 K/UL (ref 1.18–3.74)
LYMPHOCYTES RELATIVE PERCENT: 22.6 % (ref 19.3–53.1)
MCH RBC QN AUTO: 28 PG (ref 25.6–32.2)
MCHC RBC AUTO-ENTMCNC: 30.9 G/DL (ref 32.3–35.5)
MCV RBC AUTO: 90.7 FL (ref 79.4–94.8)
MONOCYTES ABSOLUTE: 0.6 K/UL (ref 0.24–0.82)
MONOCYTES RELATIVE PERCENT: 7.8 % (ref 4.7–12.5)
NEUTROPHILS ABSOLUTE: 5.05 K/UL (ref 1.56–6.13)
NEUTROPHILS RELATIVE PERCENT: 66 % (ref 34–71.1)
PDW BLD-RTO: 13.6 % (ref 11.7–14.4)
PLATELET # BLD: 225 K/UL (ref 182–369)
PMV BLD AUTO: 10.5 FL (ref 7.4–10.4)
RBC # BLD: 4.93 M/UL (ref 3.93–5.22)
WBC # BLD: 7.65 K/UL (ref 3.98–10.04)

## 2023-03-01 PROCEDURE — 1123F ACP DISCUSS/DSCN MKR DOCD: CPT | Performed by: INTERNAL MEDICINE

## 2023-03-01 PROCEDURE — 99212 OFFICE O/P EST SF 10 MIN: CPT

## 2023-03-01 PROCEDURE — 36415 COLL VENOUS BLD VENIPUNCTURE: CPT

## 2023-03-01 PROCEDURE — 85025 COMPLETE CBC W/AUTO DIFF WBC: CPT

## 2023-03-01 PROCEDURE — 99213 OFFICE O/P EST LOW 20 MIN: CPT | Performed by: INTERNAL MEDICINE

## 2023-03-06 ENCOUNTER — TRANSCRIBE ORDERS (OUTPATIENT)
Dept: PHYSICAL THERAPY | Facility: CLINIC | Age: 68
End: 2023-03-06
Payer: MEDICARE

## 2023-03-06 DIAGNOSIS — Z85.3 PERSONAL HISTORY OF BREAST CANCER: Primary | ICD-10-CM

## 2023-04-05 ENCOUNTER — TREATMENT (OUTPATIENT)
Dept: PHYSICAL THERAPY | Facility: CLINIC | Age: 68
End: 2023-04-05
Payer: MEDICARE

## 2023-04-05 DIAGNOSIS — I97.2 POST-MASTECTOMY LYMPHEDEMA SYNDROME: Primary | ICD-10-CM

## 2023-04-05 PROCEDURE — 97162 PT EVAL MOD COMPLEX 30 MIN: CPT | Performed by: PHYSICAL THERAPIST

## 2023-04-05 PROCEDURE — 97140 MANUAL THERAPY 1/> REGIONS: CPT | Performed by: PHYSICAL THERAPIST

## 2023-04-05 NOTE — PROGRESS NOTES
Physical Therapy Initial Evaluation and Plan of Care  [address] [place] [address]    Patient: Demi Morataya             : 1955  Today's Date: 2023  Referring practitioner: Denise Harrison MD  Date of Initial Visit: 2023  Patient seen for 31 sessions    Visit Diagnoses:    ICD-10-CM ICD-9-CM   1. Post-mastectomy lymphedema syndrome  I97.2 457.0     Past Medical History:   Diagnosis Date   • Acid reflux    • Anxiety    • Breast cancer    • Disease of thyroid gland    • High cholesterol    • History of chemotherapy    • Hypertension    • Kidney stone    • Lymphedema of left arm    • Panic attacks    • PONV (postoperative nausea and vomiting)      Past Surgical History:   Procedure Laterality Date   • BONE CYST EXCISION Left     CYST REMOVED FROM LEFT RING FINGER, GRAFT TAKEN FROM RIGHT HIP   • BREAST RECONSTRUCTION Bilateral    • CHOLECYSTECTOMY     • EXTRACORPOREAL SHOCK WAVE LITHOTRIPSY (ESWL) Left 2017    Procedure: EXTRACORPOREAL SHOCKWAVE LITHOTRIPSY;  Surgeon: Hollis Garcia MD;  Location: [facility] OR;  Service:    • FLAP HEAD/NECK N/A 2019    Procedure: POSSIBLE FLAP;  Surgeon: Lamine Aguiar MD;  Location: [facility] OR;  Service: ENT   • HEAD/NECK LESION/CYST EXCISION Left 2019    Procedure: 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 Quality of life: good    Pain  Current pain ratin  Location: L shoulder-          Outcome Measure:   LLIS= 86.75%       OBJECTIVE     Objective     "Lymphedema     Row Name 04/05/23 0900             Subjective Pain    Able to rate subjective pain? yes  -HR         Lymphedema Assessment    Lymphedema Classification LUE:;Trunk:;secondary;stage 2 (Spontaneously Irreversible)  -HR      Lymphedema Cancer Related Sx axillary dissection;radical mastectomy;reconstructive;simple mastectomy  -HR      Lymphedema Surgery Comments lymphovascular bypasses in LUE February 2021. Original surgeries 2011  -HR      Chemo Received yes  -HR      Radiation Therapy Received yes  -HR      Infections or Cellulitis? yes  -HR      Infection/Cellulitis Treatment most recent in January 2023  -HR         LLIS - Physical Concerns    The amount of pain associated with my lymphedema is: 3  -HR      The amount of limb heaviness associated with my lymphedema is: 4  -HR      The amount of skin tightness associated with my lymphedema is: 3  -HR      The size of my swollen limb(s) seems: 4  -HR      Lymphedema affects the movement of my swollen limb(s): 4  -HR      The strength in my swollen limb(s) is: 4  -HR         LLIS - Psychosocial Concerns    Lymphedema affects my body image (i.e., \"how I think I look\"). 4  -HR      Lymphedema affects my socializing with others. 4  -HR      Lymphedema affects my intimate relations with spouse or partner (rate 0 if not applicable 4  -HR      Lymphedema \"gets me down\" (i.e., depression, frustration, or anger) 4  -HR      I must rely on others for help due to my lymphedema. 2  -HR      I know what to do to manage my lymphedema 2  -HR         LLIS - Functional Concerns    Lymphedema affects my ability to perform self-care activities (i.e. eating, dressing, hygiene) 3  -HR      Lymphedema affects my ability to perform routine home or work-related activities. 3  -HR      Lymphedema affects my performance of preferred leisure activities. 3  -HR      Lymphedema affects proper fit of clothing/shoes 4  -HR      Lymphedema affects my sleep 4  -HR         Lymphedema " Measurements    Measurement Type(s) Volumetric  -HR      Quick Girth Areas Upper extremities  -HR      Volumetric Areas Upper extremities  -HR      Volumetric UE Distance interval (cm) 5  -HR         LUE Quick Girth (cm)    Web space 24.5 cm  -HR      LUE Quick Girth Total 24.5  -HR         LUE Volumetric (cm)    Reference Point 0 20.6  -HR      5 32.4 cm  -HR      10 35 cm  -HR      15 37 cm  -HR      20 47.7 cm  -HR      25 37 cm  -HR      30 42 cm  -HR      35 44.5 cm  -HR      40 46.5 cm  -HR      LUE Volume Calculation- 5cm 5249.9  -HR         RUE Volumetric (cm)    Reference Point 0 16.7  -HR      5 20 cm  -HR      10 22.5 cm  -HR      15 26 cm  -HR      20 28.5 cm  -HR      25 31.4 cm  -HR      30 36 cm  -HR      35 38.5 cm  -HR      40 42 cm  -HR      RUE Volume Calculation- 5cm 3152.3  -HR         Lymphedema Life Impact Scale Totals    A.  Total Q1 - Q17 (Do not include Q18) 59  -HR      B.  Total number of questions answered (Q1-Q17) 17  -HR      C. Divide A by B 3.47  -HR      D. Multiple C by 25 86.75  -HR            User Key  (r) = Recorded By, (t) = Taken By, (c) = Cosigned By    Initials Name Provider Type    HR Kerline Good, PT, DPT, CLT-SALVADOR Physical Therapist              Manual Therapy     [id number]  Comments   MLD to L pec area and dorsal hand                    Timed Minutes 10          Therapy Education/Self Care 24548   Education offered today Reviewed POC   Medbride Code    Ongoing HEP   Bring pump to see Nolvia   Timed Minutes        Total Timed Treatment:     10   mins  Total Time of Visit:            60   mins    ASSESSMENT/PLAN     Goals                                          Progress Note due by 5/4/23                                                      Recert due by 7/3/23   STG by: 6 weeks Comments Date Status   Patient will have a good basic understanding of lymphedema and its suggested risk reduction practices      Patient will be independent with remedial HEP for lymphedema       Patient will be independent with self MLD for lymphedema            LTG by: 12 weeks      Patient will have appropriate compression garments for lymphedema maintenance      Patient will have no sign or symptoms of infection      Patient will be independent with comprehensive maintenance program for lymphedema      She will be able to reach across her body with LUE to perform daily hygiene with only minimal difficulty.                    Assessment & Plan     Assessment  Impairments: abnormal or restricted ROM, lacks appropriate home exercise program and pain with function  Functional Limitations: lifting, pushing and reaching behind back  Assessment details: Demi needs a dedicated course of CDT to address her LUE lymphedema. She has been seen by us in the past but has had a big increase in the size of the L arm and it is really bothering her. She no longer has any compression garments that fit. She has the flexitouch pump, but has not been able to figure out the trunk piece for a long time. She would benefit from treatment with MLD and bandaging to start out with and then once the arm is reduced, she can get new compression garments. She will bring the pump so that we can help her adjust and use the trunk piece as well. Thank you for the referral!    Plan  Therapy options: will be seen for skilled therapy services  Planned therapy interventions: manual therapy, joint mobilization, functional ROM exercises, therapeutic activities, soft tissue mobilization, stretching and compression  Frequency: 2x week  Duration in weeks: 12  Treatment plan discussed with: patient          SIGNATURE: Kerline Good, PT, DPT, [facility] License #: [id number]  Electronically Signed on 4/5/2023    Initial Certification  Certification Period: 4/5/2023 through 7/3/2023  I certify that the therapy services are furnished while this patient is under my care.  The services outlined above are required by this patient, and will be  reviewed every 90 days.     PHYSICIAN: Denise Harrison MD (NPI: [id number])    Signature:_________________________________________DATE: ________      Please sign and return via fax to [phone].   Thank you so much for letting us work with Demi. I appreciate your letting us work with your patients. If you have any questions or concerns, please don't hesitate to contact me.          [address] [address]  [phone]

## 2023-05-01 ENCOUNTER — TREATMENT (OUTPATIENT)
Dept: PHYSICAL THERAPY | Facility: CLINIC | Age: 68
End: 2023-05-01
Payer: MEDICARE

## 2023-05-01 DIAGNOSIS — I97.2 POST-MASTECTOMY LYMPHEDEMA SYNDROME: Primary | ICD-10-CM

## 2023-05-01 PROCEDURE — 97535 SELF CARE MNGMENT TRAINING: CPT | Performed by: PHYSICAL THERAPIST

## 2023-05-01 PROCEDURE — 97140 MANUAL THERAPY 1/> REGIONS: CPT | Performed by: PHYSICAL THERAPIST

## 2023-05-01 NOTE — PROGRESS NOTES
"                                                                 Physical Therapy Treatment Note  [address] [place] [address]    Patient: Demi Morataya                                                 Visit Date: 2023  :     1955    Referring practitioner:    Denise Harrsion MD  Date of Initial Visit:          Type: THERAPY  Noted: 2016    Patient seen for 32 sessions    Visit Diagnoses:    ICD-10-CM ICD-9-CM   1. Post-mastectomy lymphedema syndrome  I97.2 457.0     SUBJECTIVE     Subjective She has tightness in the L wrist and shoulder.     PAIN: 7/10         OBJECTIVE     Objective    Lymphedema     Row Name 23 1515             Subjective Pain    Able to rate subjective pain? yes  -AL      Pre-Treatment Pain Level 7  -AL         Manual Lymphatic Drainage    Manual Lymphatic Drainage initial sequence;opened regional lymph nodes;opened anastamoses;extremity treatment  -AL      Initial Sequence short neck;abdomen;diaphragmatic breathing  -AL      Supraclavicular left;right  -AL      Abdomen superficial;deep  -AL      Diaphragmatic Breathing x 9 with deep abdominals  -AL      Opened Regional Lymph Nodes axillary;inguinal  -AL      Inguinal left;right  -AL      Opened Anastamoses axillo-inguinal  -AL      Axillo-Inguinal left;right  -AL      Extremity Treatment MLD to full limb;extremity treatment focus on  -AL      MLD to Full Limb L UE  -AL      Manual Therapy 50  -AL         Compression/Skin Care    Compression/Skin Care skin care;wrapping location;bandaging  -AL      Skin Care lotion applied  -AL      Wrapping Location upper extremity  -AL      Wrapping Location UE left:;hand to axilla  -AL      Wrapping Comments 4\" stockinette, rosidal soft for padding 1 roll. 6cm, 8cm figure 8, 10cm spiral.  -AL      Bandage Layers cotton liner;soft foam- 1/4 inch;short-stretch bandages (comment size/quantity)  -AL      Bandaging Technique circumferential/spiral;figure-eight;moderate compression  " -AL            User Key  (r) = Recorded By, (t) = Taken By, (c) = Cosigned By    Initials Name Provider Type    Divina Flower PTA, YOANA Physical Therapist Assistant                Therapy Education/Self Care 11464   Education offered today Fit the Flexitouch pump arm and trunk garments to patient.  Educated about how to don/doff the garments.    Medbridge Code    Ongoing HEP   Work on CDT   Timed Minutes 25       Total Timed Treatment:     75   mins  Total Time of Visit:             75   mins         ASSESSMENT/PLAN     GOALS        Goals                                          Progress Note due by 5/4/23                                                      Recert due by 7/3/23   STG by: 6 weeks Comments Date Status   Patient will have a good basic understanding of lymphedema and its suggested risk reduction practices         Patient will be independent with remedial HEP for lymphedema         Patient will be independent with self MLD for lymphedema                   LTG by: 12 weeks         Patient will have appropriate compression garments for lymphedema maintenance  Using short stretch bandages 5/1 Ongoing   Patient will have no sign or symptoms of infection         Patient will be independent with comprehensive maintenance program for lymphedema         She will be able to reach across her body with LUE to perform daily hygiene with only minimal difficulty.                                    Assessment/Plan     ASSESSMENT: Patient has not been able to use the Flexitouch pump as the arm piece was not fitting over the chest area.  I did add and extender to the front of the arm garment.  I have contacted [facility] to see if patient would qualify for a new Flexitouch, as the one she has is an old model and is difficult to get in and out of.  I will take measurements of the L UE next treatment.     PLAN: Continue with CDT, pursue a new Flexitouch pump.    SIGNATURE: Divina Robertson PTA, CLT-[place], [place]  License #: [id number]  Electronically Signed on 5/1/2023        [address] [place] [address]  [phone]

## 2023-05-03 ENCOUNTER — TREATMENT (OUTPATIENT)
Dept: PHYSICAL THERAPY | Facility: CLINIC | Age: 68
End: 2023-05-03
Payer: MEDICARE

## 2023-05-03 DIAGNOSIS — I97.2 POST-MASTECTOMY LYMPHEDEMA SYNDROME: Primary | ICD-10-CM

## 2023-05-03 PROCEDURE — 97140 MANUAL THERAPY 1/> REGIONS: CPT | Performed by: PHYSICAL THERAPIST

## 2023-05-03 NOTE — PROGRESS NOTES
Physical Therapy Treatment Note and 30 Day Progress Note  [address] [place] [address]    Patient: Demi Morataya                                                 Visit Date: 5/3/2023  :     1955    Referring practitioner:    Denise Harrison MD  Date of Initial Visit:          Type: THERAPY  Noted: 2016    Patient seen for 33 sessions    Visit Diagnoses:    ICD-10-CM ICD-9-CM   1. Post-mastectomy lymphedema syndrome  I97.2 457.0     SUBJECTIVE     Subjective She states she still has shoulder pain.  She feels like the wraps helped her arm, her  re-wrapped the L arm last night.     PAIN: 6/10         OBJECTIVE     Objective    Lymphedema     Row Name 23 0915             Subjective Pain    Able to rate subjective pain? yes  -AL      Pre-Treatment Pain Level 6  -AL         Lymphedema Measurements    Measurement Type(s) Volumetric  -AL      Quick Girth Areas Upper extremities  -AL      Volumetric Areas Upper extremities  -AL      Volumetric UE Distance interval (cm) 5  -AL         LUE Quick Girth (cm)    Axilla 49.1 cm  -AL      Mid upper arm 46.2 cm  -AL      Elbow 36.2 cm  -AL      Mid forearm 33 cm  -AL      Wrist crease 21.7 cm  -AL      Web space 22.1 cm  -AL      Met-heads 19.6 cm  -AL      LUE Quick Girth Total 227.9  -AL         LUE Volumetric (cm)    Reference Point 0 20.9  Wrist  -AL      5 28.9 cm  -AL      10 33 cm  -AL      15 36.1 cm  -AL      20 47.9 cm  -AL      25 37.9 cm  -AL      30 42 cm  -AL      35 44.6 cm  -AL      40 46 cm  -AL      LUE Volume Calculation- 5cm 5103.9  -AL         RUE Volumetric (cm)    Reference Point 0 16.7  -AL      RUE Volume Calculation- 5cm 0  -AL         Manual Lymphatic Drainage    Manual Lymphatic Drainage initial sequence;opened regional lymph nodes;opened anastamoses;extremity treatment  -AL      Initial Sequence short neck;abdomen;diaphragmatic breathing  -AL       "Supraclavicular left;right  -AL      Abdomen superficial;deep  -AL      Diaphragmatic Breathing x 9 with deep abdominals  -AL      Opened Regional Lymph Nodes axillary;inguinal  -AL      Inguinal left;right  -AL      Opened Anastamoses axillo-inguinal  -AL      Axillo-Inguinal left;right  -AL      Extremity Treatment MLD to full limb;extremity treatment focus on  -AL      MLD to Full Limb L UE  -AL      Manual Lymphatic Drainage Comments IASTM with the small and medium rollers to the L upper and L lower arm. STM to the L upper traps  -AL      Manual Therapy 70  -AL         Compression/Skin Care    Compression/Skin Care skin care;wrapping location;bandaging  -AL      Skin Care lotion applied  -AL      Wrapping Location upper extremity  -AL      Wrapping Location UE left:;hand to axilla  -AL      Wrapping Comments 4\" stockinette, Artiflex around wrist, rosidal soft for padding 1 roll. 6cm, 8cm figure 8, 10cm spiral.  -AL      Bandage Layers cotton liner;soft foam- 1/4 inch;short-stretch bandages (comment size/quantity)  -AL      Bandaging Technique circumferential/spiral;figure-eight;moderate compression  -AL            User Key  (r) = Recorded By, (t) = Taken By, (c) = Cosigned By    Initials Name Provider Type    Divina Flower, RENNY, YOANA Physical Therapist Assistant                Therapy Education/Self Care 91501   Education offered today Continue to re-wrap the L UE as needed.    Medbridge Code    Ongoing HEP   Work on CDT   Timed Minutes 25       Total Timed Treatment:     70   mins  Total Time of Visit:             70   mins         ASSESSMENT/PLAN     GOALS        Goals                                          Progress Note due by 6/2/23                                                      Recert due by 7/3/23   STG by: 6 weeks Comments Date Status   Patient will have a good basic understanding of lymphedema and its suggested risk reduction practices  Continue to educate 5/3 Ongoing   Patient will be " independent with remedial HEP for lymphedema She is working on the HEP 5/3 Ongoing   Patient will be independent with self MLD for lymphedema She has used her old pump. 5/3 Ongoing             LTG by: 12 weeks         Patient will have appropriate compression garments for lymphedema maintenance  Using short stretch bandages 5/3 Ongoing   Patient will have no sign or symptoms of infection No s/s of infection 5/3 Ongoing   Patient will be independent with comprehensive maintenance program for lymphedema Her  helps at home with wrapping the L arm. 5/3 Ongoing   She will be able to reach across her body with LUE to perform daily hygiene with only minimal difficulty. Still difficult to reach across her body. 5/3 Ongoing                              Assessment/Plan     ASSESSMENT: Patient has had a reduction in the L UE as compared to her initial visit.  The L arm is softer today, she does have dense tissue L wrist and forearm.  She has Truncal Lymphedema present, I did the deep abdominals along with diaphragmatic breathing.  Patient is interested in the Flexitouch pump, she did fill out the consent form.  I will send patient's information to [facility].      PLAN: Continue with CDT, pursue a new Flexitouch pump. Use a chip bag over the L forearm next treatment under the wraps.  Will see patient 2 x a week x 8 weeks.     SIGNATURE: Divina Robertson PTA, [place], [place] License #: [id number]  Electronically Signed on 5/3/2023        [address] [place] [address]. [address]  [phone]

## 2023-05-05 NOTE — PROGRESS NOTES
Progress Note Addendum      Patient: Demi Morataya           : 1955  Visit Date: 5/3/2023  Referring practitioner: Denise Harrison MD  Date of Initial Visit: Type: THERAPY  Noted: 2016  Patient seen for 33 sessions  Visit Diagnoses:    ICD-10-CM ICD-9-CM   1. Post-mastectomy lymphedema syndrome  I97.2 457.0          Clinical Progress: improved  Home Program Compliance: Yes  Progress toward previous goals: Partially Met  Prognosis to achieve goals: good    Objective     Assessment & Plan     Assessment  Impairments: abnormal or restricted ROM, lacks appropriate home exercise program and pain with function  Functional Limitations: lifting, pushing and reaching behind back  Assessment details: Patient has had a reduction in the L UE as compared to her initial visit.  The L arm is softer today, she does have dense tissue L wrist and forearm.  She has Truncal Lymphedema present, I did the deep abdominals along with diaphragmatic breathing.  Patient is interested in the Flexitouch pump, she did fill out the consent form.  I will send patient's information to [facility].    Plan  Therapy options: will be seen for skilled therapy services  Planned therapy interventions: manual therapy, joint mobilization, functional ROM exercises, therapeutic activities, soft tissue mobilization, stretching and compression  Frequency: 2x week  Duration in weeks: 8  Treatment plan discussed with: PTA        I reviewed the treatment and goals with Nolvia Robertson PTA and agree with the POC.      SIGNATURE: Kerline Good, PT, DPT, CLSHIVA-SALVADOR, License #: [id number]  Electronically Signed on 2023

## 2023-05-08 ENCOUNTER — TELEPHONE (OUTPATIENT)
Dept: PHYSICAL THERAPY | Facility: CLINIC | Age: 68
End: 2023-05-08
Payer: MEDICARE

## 2023-05-08 ENCOUNTER — TREATMENT (OUTPATIENT)
Dept: PHYSICAL THERAPY | Facility: CLINIC | Age: 68
End: 2023-05-08
Payer: MEDICARE

## 2023-05-08 DIAGNOSIS — I97.2 POST-MASTECTOMY LYMPHEDEMA SYNDROME: Primary | ICD-10-CM

## 2023-05-08 PROCEDURE — 97140 MANUAL THERAPY 1/> REGIONS: CPT | Performed by: PHYSICAL THERAPIST

## 2023-05-08 NOTE — PROGRESS NOTES
Physical Therapy Treatment Note  [address] [place] [address]    Patient: Demi Morataya                                                 Visit Date: 2023  :     1955    Referring practitioner:    Denise Harrison MD  Date of Initial Visit:          Type: THERAPY  Noted: 2023    Patient seen for 4 sessions    Visit Diagnoses:    ICD-10-CM ICD-9-CM   1. Post-mastectomy lymphedema syndrome  I97.2 457.0     SUBJECTIVE     Subjective She has been using the rolling pin on the L arm and she thinks it helped the dense tissue.  She felt bad over the weekend and ran a little fever, she thinks we flushed out some of the toxins with the rolling pin.  She also had a UTI.  She has brought her compression sleeve Mediven 550 CCL3 size L , and a Jobst gauntlet CCL2 size L    PAIN: 0/10         OBJECTIVE     Objective    Lymphedema     Row Name 23 1535             Subjective Pain    Able to rate subjective pain? yes  -AL      Pre-Treatment Pain Level 0  -AL         Manual Lymphatic Drainage    Manual Lymphatic Drainage initial sequence;opened regional lymph nodes;opened anastamoses;extremity treatment  -AL      Initial Sequence short neck;abdomen;diaphragmatic breathing  -AL      Supraclavicular left;right  -AL      Abdomen superficial;deep  -AL      Diaphragmatic Breathing x 9 with deep abdominals  -AL      Opened Regional Lymph Nodes axillary;inguinal  -AL      Inguinal left;right  -AL      Opened Anastamoses axillo-inguinal  -AL      Axillo-Inguinal left;right  -AL      Extremity Treatment MLD to full limb;extremity treatment focus on  -AL      MLD to Full Limb L UE  -AL      Manual Lymphatic Drainage Comments IASTM with the small and medium rollers to the L upper and L lower arm.  -AL      Manual Therapy 60  -AL         Compression/Skin Care    Compression/Skin Care skin care;wrapping location;bandaging  -AL      Skin Care lotion  "applied  -AL      Wrapping Location upper extremity  -AL      Wrapping Location UE left:;hand to axilla  -AL      Wrapping Comments 4\" stockinette, 1/2 in foam and  Artiflex around wrist, chip bag over forearm,  rosidal soft for padding 1 roll. 6cm, 8cm figure 8, 10cm spiral.  -AL      Bandage Layers cotton liner;soft foam- 1/4 inch;short-stretch bandages (comment size/quantity)  -AL      Bandaging Technique circumferential/spiral;figure-eight;moderate compression  -AL            User Key  (r) = Recorded By, (t) = Taken By, (c) = Cosigned By    Initials Name Provider Type    Divina Flower, PTA, TAOTJAMES Physical Therapist Assistant                Therapy Education/Self Care [id number]   Education offered today Continue to re-wrap the L UE as needed. Try a hand massager on the dense tissue, use the pit pack L lateral trunk.    Medbridge Code    Ongoing HEP   Work on CDT   Timed Minutes        Total Timed Treatment:     60   mins  Total Time of Visit:             60   mins         ASSESSMENT/PLAN     GOALS        Goals                                          Progress Note due by 6/2/23                                                      Recert due by 7/3/23   STG by: 6 weeks Comments Date Status   Patient will have a good basic understanding of lymphedema and its suggested risk reduction practices  Continue to educate 5/3 Ongoing   Patient will be independent with remedial HEP for lymphedema She is working on the HEP 5/3 Ongoing   Patient will be independent with self MLD for lymphedema We are working on getting a new Flexitouch for patient.  5/8 Ongoing             LTG by: 12 weeks         Patient will have appropriate compression garments for lymphedema maintenance  Using short stretch bandages 5/3 Ongoing   Patient will have no sign or symptoms of infection No s/s of infection 5/3 Ongoing   Patient will be independent with comprehensive maintenance program for lymphedema Her  helps at home with wrapping the " L arm. 5/3 Ongoing   She will be able to reach across her body with LUE to perform daily hygiene with only minimal difficulty. Still difficult to reach across her body. 5/3 Ongoing                              Assessment/Plan     ASSESSMENT: Patient brought the compression she was given from different lymphedema clinic.  The sleeve is a Mediven flat knit 34-46 mmHg and the glove is a Jobst 23-32 mmHg.  The sleeve is a very high compression for an arm and patient had a lot of difficulty donning the garments.  The glove is less compression than the sleeve which can cause the hand to swell, which did happen.  The arm is now softening with treatment, patient is using a roller on the L arm at home and will try the hand massager on the dense tissue.      PLAN: Continue with CDT, pursue a new Flexitouch pump.  Will see patient 2 x a week x 8 weeks.     SIGNATURE: Divina Robertson PTA, [facility] [place] License #: [id number]  Electronically Signed on 5/8/2023        [place] [address]  [phone]

## 2023-05-11 ENCOUNTER — TREATMENT (OUTPATIENT)
Dept: PHYSICAL THERAPY | Facility: CLINIC | Age: 68
End: 2023-05-11
Payer: MEDICARE

## 2023-05-11 DIAGNOSIS — I97.2 POST-MASTECTOMY LYMPHEDEMA SYNDROME: Primary | ICD-10-CM

## 2023-05-11 PROCEDURE — 97140 MANUAL THERAPY 1/> REGIONS: CPT | Performed by: PHYSICAL THERAPIST

## 2023-05-11 NOTE — PROGRESS NOTES
Physical Therapy Treatment Note  [address] [place] [address]    Patient: Demi Morataya                                                 Visit Date: 2023  :     1955    Referring practitioner:    Denise Harrison MD  Date of Initial Visit:          Type: THERAPY  Noted: 2023    Patient seen for 5 sessions    Visit Diagnoses:    ICD-10-CM ICD-9-CM   1. Post-mastectomy lymphedema syndrome  I97.2 457.0     SUBJECTIVE     Subjective She thinks the arm is doing better and her shoulder feels good. She was able to reach across her body to wash her armpit. She feels like the dense tissue in the L arm is breaking up. She did state she was not ever able to wear the compression garment as she and the therapist that ordered the garment for her were not able to get the garment on the L arm.     PAIN: 0/10         OBJECTIVE     Objective    Lymphedema     Row Name 23 0945             Subjective Pain    Post-Treatment Pain Level 0  -AL         Lymphedema Measurements    Measurement Type(s) Circumferential;Quick Girth  -AL         LUE Quick Girth (cm)    Axilla 46.8 cm  -AL      Mid upper arm 44.1 cm  -AL      Elbow 36.9 cm  -AL      Mid forearm 33.1 cm  -AL      Wrist crease 21.4 cm  -AL      Web space 21.4 cm  -AL      Met-heads 19.5 cm  -AL      LUE Quick Girth Total 223.2  -AL         Manual Lymphatic Drainage    Manual Lymphatic Drainage initial sequence;opened regional lymph nodes;opened anastamoses;extremity treatment  -AL      Initial Sequence short neck;abdomen;diaphragmatic breathing  -AL      Supraclavicular left;right  -AL      Abdomen superficial;deep  -AL      Diaphragmatic Breathing x 9 with deep abdominals  -AL      Opened Regional Lymph Nodes axillary;inguinal  -AL      Inguinal left;right  -AL      Opened Anastamoses axillo-inguinal  -AL      Axillo-Inguinal left;right  -AL      Extremity Treatment MLD to full  "limb;extremity treatment focus on  -AL      MLD to Full Limb L UE  -AL      Manual Lymphatic Drainage Comments IASTM with the small and medium rollers to the L upper and L lower arm.  -AL      Manual Therapy 65  -AL         Compression/Skin Care    Compression/Skin Care skin care;wrapping location;bandaging  -AL      Skin Care lotion applied  -AL      Wrapping Location upper extremity  -AL      Wrapping Location UE left:;hand to axilla  -AL      Wrapping Comments 4\" stockinette,  Artiflex around wrist, chip bag over forearm,  rosidal soft for padding 1 roll. 6cm, 8cm figure 8, 10cm spiral.  -AL      Bandage Layers cotton liner;soft foam- 1/4 inch;short-stretch bandages (comment size/quantity)  -AL      Bandaging Technique circumferential/spiral;figure-eight;moderate compression  -AL      Compression/Skin Care Comments I also gave patient a Edema Wear garment for the L UE.  -AL            User Key  (r) = Recorded By, (t) = Taken By, (c) = Cosigned By    Initials Name Provider Type    Divina Flower PTA, CLT-SALVADOR Physical Therapist Assistant                Therapy Education/Self Care [id number]   Education offered today Continue to re-wrap the L UE as needed. Try a hand massager on the dense tissue, use the pit pack L lateral trunk.    Medbridge Code    Ongoing HEP   Work on CDT   Timed Minutes        Total Timed Treatment:     65   mins  Total Time of Visit:             65   mins         ASSESSMENT/PLAN     GOALS        Goals                                          Progress Note due by 6/2/23                                                      Recert due by 7/3/23   STG by: 6 weeks Comments Date Status   Patient will have a good basic understanding of lymphedema and its suggested risk reduction practices  Continue to educate 5/3 Ongoing   Patient will be independent with remedial HEP for lymphedema She is working on the HEP 5/3 Ongoing   Patient will be independent with self MLD for lymphedema We are working on " getting a new Flexitouch for patient.  5/8 Ongoing             LTG by: 12 weeks         Patient will have appropriate compression garments for lymphedema maintenance  Using short stretch bandages 5/3 Ongoing   Patient will have no sign or symptoms of infection No s/s of infection 5/3 Ongoing   Patient will be independent with comprehensive maintenance program for lymphedema Her  helps at home with wrapping the L arm. 5/3 Ongoing   She will be able to reach across her body with LUE to perform daily hygiene with only minimal difficulty. She was able to reach across her body to wash her armpit. 5/11 Ongoing                              Assessment/Plan     ASSESSMENT: The L arm is much softer today and she has had a reduction in the L UE as compared to her last measurements.  Patient was not jeannette to wear the Mediven garment since it was not the correct size.  I will contact [employer] to see if there is anything that can be done about the garment as far as getting a new one.  I don't think they will ok a remake, but I do want to check.  I was in contact with Maurice from [facility] and they will get in touch with patient about a Flexitouch demo. Patient is not having any L shoulder pain and she was able to reach across her body to wash her armpit.    PLAN: Continue with CDT, pursue a new Flexitouch pump.  Will see patient 2 x a week x 8 weeks.     SIGNATURE: Divina Robertson PTA, [place], [place] License #: [id number]  Electronically Signed on 5/11/2023        [address] [place] [address]. [address]  [phone]

## 2023-05-15 ENCOUNTER — TREATMENT (OUTPATIENT)
Dept: PHYSICAL THERAPY | Facility: CLINIC | Age: 68
End: 2023-05-15
Payer: MEDICARE

## 2023-05-15 DIAGNOSIS — I97.2 POST-MASTECTOMY LYMPHEDEMA SYNDROME: Primary | ICD-10-CM

## 2023-05-15 PROCEDURE — 97140 MANUAL THERAPY 1/> REGIONS: CPT | Performed by: PHYSICAL THERAPIST

## 2023-05-15 NOTE — PROGRESS NOTES
"                                                                 Physical Therapy Treatment Note  [address] [place] [address]    Patient: Demi Morataya                                                 Visit Date: 5/15/2023  :     1955    Referring practitioner:    Denise Harrison MD  Date of Initial Visit:          Type: THERAPY  Noted: 2023    Patient seen for 6 sessions    Visit Diagnoses:    ICD-10-CM ICD-9-CM   1. Post-mastectomy lymphedema syndrome  I97.2 457.0     SUBJECTIVE     Subjective  She had the Flexitouch demo today.  She states her arm is doing well but her hand is doing well.  Her shoulder is doing better and the tissue is soft in her arm.  She did have a \"lump\" L lower arm after the Flexitouch one night.    PAIN: 0/10         OBJECTIVE     Objective    Lymphedema     Row Name 05/15/23 1515             Subjective Pain    Able to rate subjective pain? yes  -AL      Pre-Treatment Pain Level 0  -AL         Manual Lymphatic Drainage    Manual Lymphatic Drainage initial sequence;opened regional lymph nodes;opened anastamoses;extremity treatment  -AL      Initial Sequence short neck;abdomen;diaphragmatic breathing  -AL      Supraclavicular left;right  -AL      Abdomen superficial;deep  -AL      Diaphragmatic Breathing x 9 with deep abdominals  -AL      Opened Regional Lymph Nodes axillary;inguinal  -AL      Inguinal left;right  -AL      Opened Anastamoses axillo-inguinal  -AL      Axillo-Inguinal left;right  -AL      Extremity Treatment MLD to full limb;extremity treatment focus on  -AL      MLD to Full Limb L UE  -AL      Manual Lymphatic Drainage Comments IASTM with the small and medium rollers to the L upper and L lower arm.  -AL      Manual Therapy 70  -AL         Compression/Skin Care    Compression/Skin Care --  -AL      Skin Care --  -AL      Wrapping Location --  -AL      Wrapping Location UE --  -AL      Wrapping Comments --  -AL      Bandage Layers --  -AL      Bandaging " Technique --  -AL      Compression/Skin Care Comments Patient will don her compression wraps at home.  -AL            User Key  (r) = Recorded By, (t) = Taken By, (c) = Cosigned By    Initials Name Provider Type    Divina Flower, RENNY, KIMBERLY-SALVADOR Physical Therapist Assistant                Therapy Education/Self Care [id number]   Education offered today Continue to re-wrap the L UE as needed. Try a hand massager on the dense tissue, use the pit pack L lateral trunk.    Medbridge Code    Ongoing HEP   Work on CDT   Timed Minutes        Total Timed Treatment:     70   mins  Total Time of Visit:             70   mins         ASSESSMENT/PLAN     GOALS        Goals                                          Progress Note due by 6/2/23                                                      Recert due by 7/3/23   STG by: 6 weeks Comments Date Status   Patient will have a good basic understanding of lymphedema and its suggested risk reduction practices  Continue to educate 5/3 Ongoing   Patient will be independent with remedial HEP for lymphedema She is working on the HEP 5/3 Ongoing   Patient will be independent with self MLD for lymphedema We are working on getting a new Flexitouch for patient.  5/8 Ongoing             LTG by: 12 weeks         Patient will have appropriate compression garments for lymphedema maintenance  She will be measured for a new custom sleeve 6/23 5/15 Ongoing   Patient will have no sign or symptoms of infection No s/s of infection 5/3 Ongoing   Patient will be independent with comprehensive maintenance program for lymphedema Her  helps at home with wrapping the L arm. 5/3 Ongoing   She will be able to reach across her body with LUE to perform daily hygiene with only minimal difficulty. She was able to reach across her body to wash her armpit. 5/11 Ongoing                              Assessment/Plan     ASSESSMENT: The L arm continues to soften. I did speak with the [employer], about patient's  custom garment that does not fit.  Bar will re-measure patient for a custom garment on 6/23/23 since the current Medi garment she has is too tight.  Patient requested to wrap the arm at home, her  will help.  I will measure the L UE next treatment.  Patient had the Flexitouch demo today, hopefully she will have the new pump soon.     PLAN: Continue with CDT, pursue a new Flexitouch pump.  Will see patient 2 x a week x 8 weeks.     SIGNATURE: Divina Robertson PTA, [facility] License #: [id number]  Electronically Signed on 5/15/2023        [address] [place] [address] [place] [address]  [phone]

## 2023-05-17 ENCOUNTER — TREATMENT (OUTPATIENT)
Dept: PHYSICAL THERAPY | Facility: CLINIC | Age: 68
End: 2023-05-17
Payer: MEDICARE

## 2023-05-17 DIAGNOSIS — I97.2 POST-MASTECTOMY LYMPHEDEMA SYNDROME: Primary | ICD-10-CM

## 2023-05-17 PROCEDURE — 97140 MANUAL THERAPY 1/> REGIONS: CPT | Performed by: PHYSICAL THERAPIST

## 2023-05-17 NOTE — PROGRESS NOTES
Physical Therapy Treatment Note  [address] [place] [address]    Patient: Demi Morataya                                                 Visit Date: 2023  :     1955    Referring practitioner:    Denise Harrison MD  Date of Initial Visit:          Type: THERAPY  Noted: 2023    Patient seen for 7 sessions    Visit Diagnoses:    ICD-10-CM ICD-9-CM   1. Post-mastectomy lymphedema syndrome  I97.2 457.0     SUBJECTIVE     Subjective  She states she was sore in L armpit from the stretching last treatment. She did wear the Edema Wear for about an hour and a half after her shower until her  could wrap the L arm.      PAIN: 0/10         OBJECTIVE     Objective    Lymphedema       Row Name 23 0915             Subjective Pain    Able to rate subjective pain? yes  -AL      Pre-Treatment Pain Level 0  -AL         Lymphedema Measurements    Measurement Type(s) Circumferential  -AL      Quick Girth Areas Upper extremities  -AL         LUE Quick Girth (cm)    Axilla 46.7 cm  -AL      Mid upper arm 42.5 cm  -AL      Elbow 36.2 cm  -AL      Mid forearm 32.6 cm  -AL      Wrist crease 21.7 cm  -AL      Web space 20.9 cm  -AL      Met-heads 19.2 cm  -AL      LUE Quick Girth Total 219.8  -AL         Manual Lymphatic Drainage    Manual Lymphatic Drainage initial sequence;opened regional lymph nodes;opened anastamoses;extremity treatment  -AL      Initial Sequence short neck;abdomen;diaphragmatic breathing  -AL      Supraclavicular left;right  -AL      Abdomen superficial;deep  -AL      Diaphragmatic Breathing x 9 with deep abdominals  -AL      Opened Regional Lymph Nodes axillary;inguinal  -AL      Inguinal left;right  -AL      Opened Anastamoses axillo-inguinal  -AL      Axillo-Inguinal left;right  -AL      Extremity Treatment MLD to full limb;extremity treatment focus on  -AL      Extremity Treatment Focus On L UE  -AL      Manual  "Lymphatic Drainage Comments IASTM with the small and medium rollers to the L upper and L lower arm.  Two \"I\" strips L posterior arm stopping at the elbow. A fan cut piece on the anterior and posterior forearm with the posterior over the dorsum of the hand.  -AL      Manual Therapy 70  -AL         Compression/Skin Care    Compression/Skin Care skin care;wrapping location;bandaging  -AL      Skin Care lotion applied  -AL      Wrapping Location upper extremity  -AL      Wrapping Location UE left:;hand to axilla  -AL      Wrapping Comments 4\" stockinette,  Artiflex around wrist, chip bag over forearm,  rosidal soft for padding 1 roll. 6cm, 8cm figure 8, 10cm spiral.  -AL      Bandage Layers cotton liner;soft foam- 1/4 inch;short-stretch bandages (comment size/quantity)  -AL      Bandaging Technique circumferential/spiral;figure-eight;moderate compression  -AL      Compression/Skin Care Comments Also small gray foam piece at elbow.  -AL                User Key  (r) = Recorded By, (t) = Taken By, (c) = Cosigned By      Initials Name Provider Type    Divina Flower PTA, TAOTJAMES Physical Therapist Assistant                    Therapy Education/Self Care [id number]   Education offered today Remove tape after 3-5 days or if irritates the skin.   Medbridge Code    Ongoing HEP   Work on CDT   Timed Minutes        Total Timed Treatment:     70   mins  Total Time of Visit:             70   mins         ASSESSMENT/PLAN     GOALS        Goals                                          Progress Note due by 6/2/23                                                      Recert due by 7/3/23   STG by: 6 weeks Comments Date Status   Patient will have a good basic understanding of lymphedema and its suggested risk reduction practices  Continue to educate 5/3 Ongoing   Patient will be independent with remedial HEP for lymphedema She is working on the HEP 5/3 Ongoing   Patient will be independent with self MLD for lymphedema We are working on " getting a new Flexitouch for patient.  5/8 Ongoing             LTG by: 12 weeks         Patient will have appropriate compression garments for lymphedema maintenance  She will be measured for a new custom sleeve 6/23 5/15 Ongoing   Patient will have no sign or symptoms of infection No s/s of infection 5/3 Ongoing   Patient will be independent with comprehensive maintenance program for lymphedema Her  helps at home with wrapping the L arm. 5/3 Ongoing   She will be able to reach across her body with LUE to perform daily hygiene with only minimal difficulty. Waiting for the new Flexitouch  5/17 Ongoing                              Assessment/Plan     ASSESSMENT:  Patient has had a reduction of 3.4 cm as compared to her last measurements. The tissue continues to become less dense in the L UE.  I did use Kinesio tape to help reduce the L UE today, I also added a piece of gray foam at the elbow to make sure fluid was not getting trapped distal or proximal to the elbow.     PLAN: Continue with CDT, pursue a new Flexitouch pump.  Assess effectiveness of the Kinesio tape. Will see patient 2 x a week x 8 weeks.     SIGNATURE: Divina Robertson PTA, [place], [place] License #: [id number]  Electronically Signed on 5/17/2023        [facility] [place] [address]  [phone]

## 2023-05-22 ENCOUNTER — TREATMENT (OUTPATIENT)
Dept: PHYSICAL THERAPY | Facility: CLINIC | Age: 68
End: 2023-05-22
Payer: MEDICARE

## 2023-05-22 DIAGNOSIS — I97.2 POST-MASTECTOMY LYMPHEDEMA SYNDROME: Primary | ICD-10-CM

## 2023-05-22 PROCEDURE — 97140 MANUAL THERAPY 1/> REGIONS: CPT | Performed by: PHYSICAL THERAPIST

## 2023-05-22 NOTE — PROGRESS NOTES
"                                                                 Physical Therapy Treatment Note  [address] [place] [address]    Patient: Demi Morataya                                                 Visit Date: 2023  :     1955    Referring practitioner:    Denise Harrison MD  Date of Initial Visit:          Type: THERAPY  Noted: 2023    Patient seen for 8 sessions    Visit Diagnoses:    ICD-10-CM ICD-9-CM   1. Post-mastectomy lymphedema syndrome  I97.2 457.0     SUBJECTIVE     Subjective  She states went camping over the weekend and slept on an air mattress when camping and her shoulders are sore.  She was on a boat Saturday and wasn't able to wear her wraps.  Last Thursday she had an area on her arm from the pit pack that was indented.  She did contact me and I stated to take the pit pack off and not to wear it anymore.  She did this and her arm was fine.     PAIN: 0/10         OBJECTIVE     Objective    Lymphedema       Row Name 23 1512             Subjective Pain    Able to rate subjective pain? yes  -AL      Pre-Treatment Pain Level 0  -AL         Manual Lymphatic Drainage    Manual Lymphatic Drainage initial sequence;opened regional lymph nodes;opened anastamoses;extremity treatment  -AL      Initial Sequence short neck;abdomen;diaphragmatic breathing  -AL      Supraclavicular left;right  -AL      Abdomen superficial;deep  -AL      Diaphragmatic Breathing x 9 with deep abdominals  -AL      Opened Regional Lymph Nodes axillary;inguinal  -AL      Inguinal left;right  -AL      Opened Anastamoses axillo-inguinal  -AL      Axillo-Inguinal left;right  -AL      Extremity Treatment MLD to full limb;extremity treatment focus on  -AL      MLD to Full Limb L UE  -AL      Extremity Treatment Focus On --  -AL      Manual Lymphatic Drainage Comments IASTM with the small and medium rollers to the L upper and L lower arm.  Two \"I\" strips L posterior arm stopping at the elbow. A fan cut " "piece on the anterior and posterior forearm with the posterior over the dorsum of the hand.  -AL      Manual Therapy 70  -AL         Compression/Skin Care    Compression/Skin Care skin care;wrapping location;bandaging  -AL      Skin Care lotion applied  -AL      Wrapping Location upper extremity  -AL      Wrapping Location UE left:;hand to axilla  -AL      Wrapping Comments 4\" stockinette,  Artiflex around wrist, rosidal soft for padding 1 roll. 6cm, 8cm figure 8, 10cm spiral.  -AL      Bandage Layers cotton liner;soft foam- 1/4 inch;short-stretch bandages (comment size/quantity)  -AL      Bandaging Technique circumferential/spiral;figure-eight;moderate compression  -AL      Compression/Skin Care Comments Left the \"I\" strips on the tricep area, I did apply new fan cut tape to the anterior and posterior forearm on the L.  -AL                User Key  (r) = Recorded By, (t) = Taken By, (c) = Cosigned By      Initials Name Provider Type    Divina Flower, RENNY, YOANA Physical Therapist Assistant                    Therapy Education/Self Care [id number]   Education offered today Remove tape after 3-5 days or if irritates the skin.   Medbridge Code    Ongoing HEP   Work on CDT   Timed Minutes        Total Timed Treatment:     70   mins  Total Time of Visit:             70   mins         ASSESSMENT/PLAN     GOALS        Goals                                          Progress Note due by 6/2/23                                                      Recert due by 7/3/23   STG by: 6 weeks Comments Date Status   Patient will have a good basic understanding of lymphedema and its suggested risk reduction practices  Continue to educate 5/3 Ongoing   Patient will be independent with remedial HEP for lymphedema She is working on the HEP 5/3 Ongoing   Patient will be independent with self MLD for lymphedema We are working on getting a new Flexitouch for patient.  5/8 Ongoing             LTG by: 12 weeks         Patient will have " appropriate compression garments for lymphedema maintenance  She will be measured for a new custom sleeve 6/23 5/15 Ongoing   Patient will have no sign or symptoms of infection No s/s of infection 5/3 Ongoing   Patient will be independent with comprehensive maintenance program for lymphedema Compliant with the compression, she will be measured for a new garment in June. 5/22 Ongoing   She will be able to reach across her body with LUE to perform daily hygiene with only minimal difficulty. Waiting for the new Flexitouch  5/17 Ongoing                              Assessment/Plan     ASSESSMENT:  I did not use the pit pack today since it caused deeper marks than usual on the forearm after her last visit.  I did reapply the tape on the anterior/posterior forearm, will see if the tape plus the wrapping helps the hand and lower arm.  Will measure patient's L UE next treatment.     PLAN: Continue with CDT, pursue a new Flexitouch pump. Will see patient 2 x a week x 8 weeks.     SIGNATURE: Divina Robertson PTA, [facility] [place] License #: [id number]  Electronically Signed on 5/22/2023        [address] [place] [address] [place] [address]  [phone]

## 2023-05-24 ENCOUNTER — TREATMENT (OUTPATIENT)
Dept: PHYSICAL THERAPY | Facility: CLINIC | Age: 68
End: 2023-05-24
Payer: MEDICARE

## 2023-05-24 DIAGNOSIS — I97.2 POST-MASTECTOMY LYMPHEDEMA SYNDROME: Primary | ICD-10-CM

## 2023-05-24 PROCEDURE — 97110 THERAPEUTIC EXERCISES: CPT | Performed by: PHYSICAL THERAPIST

## 2023-05-24 PROCEDURE — 97140 MANUAL THERAPY 1/> REGIONS: CPT | Performed by: PHYSICAL THERAPIST

## 2023-05-24 NOTE — PROGRESS NOTES
Physical Therapy Treatment Note and 30 Day Progress Note  [address] [place] [address]    Patient: Demi Morataya                                                 Visit Date: 2023  :     1955    Referring practitioner:    Denise Harrison MD  Date of Initial Visit:          Type: THERAPY  Noted: 2023    Patient seen for 9 sessions    Visit Diagnoses:    ICD-10-CM ICD-9-CM   1. Post-mastectomy lymphedema syndrome  I97.2 457.0     SUBJECTIVE     Subjective  She did fine with the wraps and the tape, no pain today.      PAIN: 0/10         OBJECTIVE     Objective    Lymphedema       Row Name 23 0915             Subjective Pain    Able to rate subjective pain? yes  -AL      Pre-Treatment Pain Level 0  -AL         Lymphedema Measurements    Measurement Type(s) Circumferential  -AL      Quick Girth Areas Upper extremities  -AL         LUE Quick Girth (cm)    Axilla 45 cm  -AL      Mid upper arm 43 cm  -AL      Elbow 36 cm  -AL      Mid forearm 31 cm  -AL      Wrist crease 20.3 cm  -AL      Web space 20.6 cm  -AL      Met-heads 19.5 cm  -AL      LUE Quick Girth Total 215.4  -AL         Manual Lymphatic Drainage    Manual Lymphatic Drainage initial sequence;opened regional lymph nodes;opened anastamoses;extremity treatment  -AL      Initial Sequence short neck;abdomen;diaphragmatic breathing  -AL      Supraclavicular left;right  -AL      Abdomen superficial;deep  -AL      Diaphragmatic Breathing x 9 with deep abdominals  -AL      Opened Regional Lymph Nodes axillary;inguinal  -AL      Inguinal left;right  -AL      Opened Anastamoses axillo-inguinal  -AL      Axillo-Inguinal left;right  -AL      Extremity Treatment MLD to full limb;extremity treatment focus on  -AL      MLD to Full Limb L UE  -AL      Manual Lymphatic Drainage Comments IASTM with the small and medium rollers to the L upper and L lower arm.  Also IASTM using the  vibrating roller to the same areas.  IASTM with the small red roller and scar tissue massage L axilla.  -AL      Manual Therapy 75  -AL         Compression/Skin Care    Compression/Skin Care skin care;wrapping location;bandaging  -AL      Skin Care lotion applied  -AL      Wrapping Location upper extremity  -AL      Wrapping Location UE left:;hand to axilla  -AL      Bandage Layers cotton liner;soft foam- 1/4 inch;short-stretch bandages (comment size/quantity)  -AL      Bandaging Technique circumferential/spiral;figure-eight;moderate compression  -AL      Compression/Skin Care Comments I did not replace the tape since it is still in place.  I gave patient more tape to use when the other pieces are removed.  -AL                User Key  (r) = Recorded By, (t) = Taken By, (c) = Cosigned By      Initials Name Provider Type    Divina Flower PTA, CLTJAMES Physical Therapist Assistant                    Therapeutic Exercises    32511 Units Comments   Stretched the L shoulder into flexion, scaption, abduction, and ER/IR     L shoulder abduction with Wrist extension nerve glides.                     Timed Minutes 10        Therapy Education/Self Care 95338   Education offered today Remove tape after 3-5 days.  Replace with new tape.   Medbridge Code    Ongoing HEP   Work on CDT   Timed Minutes        Total Timed Treatment:     85   mins  Total Time of Visit:             85   mins         ASSESSMENT/PLAN     GOALS        Goals                                          Progress Note due by 6/23/23                                                      Recert due by 7/3/23   STG by: 6 weeks Comments Date Status   Patient will have a good basic understanding of lymphedema and its suggested risk reduction practices  Continue to educate 5/24 Ongoing   Patient will be independent with remedial HEP for lymphedema She is working on the HEP 5/24 Ongoing   Patient will be independent with self MLD for lymphedema She is working on  the MLD and using the old Flexitouch 5/24 Ongoing             LTG by: 12 weeks         Patient will have appropriate compression garments for lymphedema maintenance  She will be measured for a new custom sleeve 6/23 5/24 Ongoing   Patient will have no sign or symptoms of infection No s/s of infection 5/24 Ongoing   Patient will be independent with comprehensive maintenance program for lymphedema Working towards her home program. 5/24 Ongoing   She will be able to reach across her body with LUE to perform daily hygiene with only minimal difficulty. She is now able to reach across her body for daily hygiene. 5/24 Met                              Assessment/Plan     ASSESSMENT:  Patient has had a reduction of 4.4 cm as compared to her last visit. She has had a total reduction of 12.4 cm in the past 3 weeks. There is only a minimal amount of slightly dense tissue L forearm and one area at the wrist.  She is still waiting for insurance approval for the new Flexitouch.  Patient did have some L hand numbness with the shoulder stretches, this improved with the nerve glides. Overall patient has made great progress.     PLAN: Continue with CDT, pursue a new Flexitouch pump. Will see patient 2 x a week x 8 weeks.     SIGNATURE: Divina Robertson PTA, [facility] License #: [id number]  Electronically Signed on 5/24/2023        [place] [address]  [phone]

## 2023-06-02 ENCOUNTER — TREATMENT (OUTPATIENT)
Dept: PHYSICAL THERAPY | Facility: CLINIC | Age: 68
End: 2023-06-02

## 2023-06-02 DIAGNOSIS — I97.2 POST-MASTECTOMY LYMPHEDEMA SYNDROME: Primary | ICD-10-CM

## 2023-06-02 PROCEDURE — 97140 MANUAL THERAPY 1/> REGIONS: CPT | Performed by: PHYSICAL THERAPIST

## 2023-06-02 NOTE — PROGRESS NOTES
"                                                                 Physical Therapy Treatment Note  [address] [place] [address]    Patient: Demi Morataya                                                 Visit Date: 2023  :     1955    Referring practitioner:    Denise Harrison MD  Date of Initial Visit:          Type: THERAPY  Noted: 2023    Patient seen for 10 sessions    Visit Diagnoses:    ICD-10-CM ICD-9-CM   1. Post-mastectomy lymphedema syndrome  I97.2 457.0     SUBJECTIVE     Subjective  She fell out of her bed this morning while she was sleeping. She doesn't know how or why but she hit her bedside table, broke the lamp and her head is sore.     PAIN: 0/10         OBJECTIVE     Objective    Lymphedema       Row Name 23 0900             Subjective Pain    Able to rate subjective pain? yes  -HR      Pre-Treatment Pain Level 0  -HR         Manual Lymphatic Drainage    Manual Lymphatic Drainage initial sequence;opened regional lymph nodes;opened anastamoses;extremity treatment  -HR      Initial Sequence short neck;abdomen;diaphragmatic breathing  -HR      Supraclavicular left;right  -HR      Abdomen superficial;deep  -HR      Diaphragmatic Breathing x 9 with deep abdominals  -HR      Opened Regional Lymph Nodes axillary;inguinal  -HR      Inguinal left;right  -HR      Opened Anastamoses axillo-inguinal  -HR      Axillo-Inguinal left;right  -HR      Extremity Treatment MLD to full limb;extremity treatment focus on  -HR      MLD to Full Limb LUE  -HR      Manual Lymphatic Drainage Comments tissues in the arm felt pretty soft  -HR      Manual Therapy 75  -HR         Compression/Skin Care    Compression/Skin Care skin care;wrapping location;bandaging  -HR      Skin Care lotion applied  -HR      Wrapping Location upper extremity  -HR      Wrapping Location UE left:;hand to axilla  -HR      Wrapping Comments 4\" stockinette,  Artiflex around wrist, rosidal soft for padding 1 roll. 6cm, " "8cm figure 8, 10cm spiral.  -HR      Bandage Layers cotton liner;soft foam- 1/4 inch;short-stretch bandages (comment size/quantity)  -HR      Bandaging Technique circumferential/spiral;figure-eight;moderate compression  -HR      Compression/Skin Care Comments Didn't use any of the foam pieces she had in her bag. We both felt like the arm was pretty soft even in the \"problem\" areas today  -HR                User Key  (r) = Recorded By, (t) = Taken By, (c) = Cosigned By      Initials Name Provider Type    HR Kerline Good, PT, DPT, CLT-SALV[facility] Physical Therapist                      Therapy Education/Self Care 86100   Education offered today Use tape as needed   Medbridge Code    Ongoing HEP   Work on CDT   Timed Minutes        Total Timed Treatment:     75   mins  Total Time of Visit:             75   mins         ASSESSMENT/PLAN     GOALS        Goals                                          Progress Note due by 6/23/23                                                      Recert due by 7/3/23   STG by: 6 weeks Comments Date Status   Patient will have a good basic understanding of lymphedema and its suggested risk reduction practices  Continue to educate 5/24 Ongoing   Patient will be independent with remedial HEP for lymphedema She is working on the HEP 5/24 Ongoing   Patient will be independent with self MLD for lymphedema She is working on the MLD and using the old Flexitouch 5/24 Ongoing             LTG by: 12 weeks         Patient will have appropriate compression garments for lymphedema maintenance  She will be measured for a new custom sleeve 6/23 5/24 Ongoing   Patient will have no sign or symptoms of infection No s/s of infection 5/24 Ongoing   Patient will be independent with comprehensive maintenance program for lymphedema Working towards her home program.Her  is doing well with assisting in bandaging 6/2 Ongoing   She will be able to reach across her body with LUE to perform daily hygiene with " only minimal difficulty. She is now able to reach across her body for daily hygiene. 5/24 Met                              Assessment/Plan     ASSESSMENT:  Patient has had a great response to CDT and her arm is feeling much better and has softened and reduced well already. She is keeping it wrapped and has learned the hard way that she can't just ignore it. Hasn't heard back about a new flexitouch garment.       PLAN: Continue with CDT, pursue a new Flexitouch pump. Will see patient 2 x a week x 8 weeks.     SIGNATURE: Kerline Good, PT, DPT, [facility] License #: [id number]  Electronically Signed on 6/2/2023        [address] [place] [address] [place] [address]  [phone]

## 2023-06-06 ENCOUNTER — TREATMENT (OUTPATIENT)
Dept: PHYSICAL THERAPY | Facility: CLINIC | Age: 68
End: 2023-06-06
Payer: MEDICARE

## 2023-06-06 DIAGNOSIS — I97.2 POST-MASTECTOMY LYMPHEDEMA SYNDROME: Primary | ICD-10-CM

## 2023-06-06 PROCEDURE — 97140 MANUAL THERAPY 1/> REGIONS: CPT | Performed by: PHYSICAL THERAPIST

## 2023-06-06 NOTE — PROGRESS NOTES
"                                                                 Physical Therapy Treatment Note  [address], [place] [address]    Patient: Demi Morataya                                                 Visit Date: 2023  :     1955    Referring practitioner:    Denise Harrison MD  Date of Initial Visit:          Type: THERAPY  Noted: 2023    Patient seen for 11 sessions    Visit Diagnoses:    ICD-10-CM ICD-9-CM   1. Post-mastectomy lymphedema syndrome  I97.2 457.0     SUBJECTIVE     Subjective  She is tired.     PAIN: 0/10         OBJECTIVE     Objective    Lymphedema       Row Name 23 0900             Subjective Pain    Able to rate subjective pain? yes  -HR         Lymphedema Measurements    Measurement Type(s) Circumferential  -HR      Quick Girth Areas Upper extremities  -HR         Manual Lymphatic Drainage    Manual Lymphatic Drainage initial sequence;opened regional lymph nodes;opened anastamoses;extremity treatment  -HR      Initial Sequence short neck;abdomen;diaphragmatic breathing  -HR      Supraclavicular left;right  -HR      Abdomen superficial;deep  -HR      Opened Regional Lymph Nodes axillary;inguinal  -HR      Inguinal left;right  -HR      Opened Anastamoses axillo-inguinal  -HR      Axillo-Inguinal left;right  -HR      Extremity Treatment MLD to full limb;extremity treatment focus on  -HR      MLD to Full Limb LUE  -HR      Manual Therapy 70  -HR         Compression/Skin Care    Compression/Skin Care skin care;wrapping location;bandaging  -HR      Skin Care lotion applied  -HR      Wrapping Location upper extremity  -HR      Wrapping Location UE left:;hand to axilla  -HR      Wrapping Comments 4\" stockinette, Artiflex around wrist, rosidal soft for padding 1 roll. 6cm, 8cm figure 8, 10cm spiral.  -HR      Bandage Layers cotton liner;soft foam- 1/4 inch;short-stretch bandages (comment size/quantity)  -HR      Bandaging Technique " circumferential/spiral;figure-eight;moderate compression  -HR      Compression/Skin Care Comments Wanted to wait for the taping until Friday as she is likely going to be in the pool tomorrow.  -HR                User Key  (r) = Recorded By, (t) = Taken By, (c) = Cosigned By      Initials Name Provider Type    HR Kerline Good, PT, DPT, CLT-SALVADOR Physical Therapist                      Therapy Education/Self Care 90782   Education offered today Use tape as needed   Medbridge Code    Ongoing HEP   Work on CDT   Timed Minutes        Total Timed Treatment:     70   mins  Total Time of Visit:             70   mins         ASSESSMENT/PLAN     GOALS        Goals                                          Progress Note due by 6/23/23                                                      Recert due by 7/3/23   STG by: 6 weeks Comments Date Status   Patient will have a good basic understanding of lymphedema and its suggested risk reduction practices  Continue to educate 5/24 Ongoing   Patient will be independent with remedial HEP for lymphedema She is working on the HEP 5/24 Ongoing   Patient will be independent with self MLD for lymphedema She is working on the MLD and using the old Flexitouch 5/24 Ongoing             LTG by: 12 weeks         Patient will have appropriate compression garments for lymphedema maintenance  She will be measured for a new custom sleeve 6/23 5/24 Ongoing   Patient will have no sign or symptoms of infection No s/s of infection 5/24 Ongoing   Patient will be independent with comprehensive maintenance program for lymphedema Working towards her home program.Her  is doing well with assisting in bandaging 6/2 Ongoing   She will be able to reach across her body with LUE to perform daily hygiene with only minimal difficulty. She is now able to reach across her body for daily hygiene. 5/24 Met                              Assessment/Plan     ASSESSMENT:  Patient continues to feel that the arm is  softer and not nearly as heavy. She is wearing the wraps daily. Patient presents with stage 2 lymphedema. She has tried to manage her swelling with exercise, elevation, and compression for 4+ weeks. She has also used the [employer]  basic compression pump in home from 09/27/2016 to 12/28/2016.    Despite daily use of this basic pump set at 30mmHg, she was experiencing a proximal increase in fluid due to the basic pump displacing fluid into her lateral chest wall causing proximal and truncal swelling along with discomfort. On 12/28/2016 she was advanced to the Flexitouch compression device from [employer], which she has since been using since with significant volume reduction and ideal results.    The Flexitouch device offers the waist garment, lighter pressures, and wrap around proximal to distal clearing that will lessens the risk of moving more fluid into her abdomen or chest wall.    Her current Flexitouch device is approaching 7 years old, and because of normal wear and tear, no longer operating normally. We would like to get her a new Flexitouch device to continue to treat and maintain her Lymphedema.    PLAN: Continue with CDT, pursue a new Flexitouch pump.     SIGNATURE: Kerline Good, PT, DPT, [facility] License #: [id number]  Electronically Signed on 6/6/2023        [address] [place] [address] [place]  [place] [address]  [phone]

## 2023-06-09 ENCOUNTER — TREATMENT (OUTPATIENT)
Dept: PHYSICAL THERAPY | Facility: CLINIC | Age: 68
End: 2023-06-09
Payer: MEDICARE

## 2023-06-09 DIAGNOSIS — I97.2 POST-MASTECTOMY LYMPHEDEMA SYNDROME: Primary | ICD-10-CM

## 2023-06-09 PROCEDURE — 97140 MANUAL THERAPY 1/> REGIONS: CPT | Performed by: PHYSICAL THERAPIST

## 2023-06-09 NOTE — PROGRESS NOTES
Physical Therapy Treatment Note  [address] [place] [address]    Patient: Demi Morataya                                                 Visit Date: 2023  :     1955    Referring practitioner:    Denise Harrison MD  Date of Initial Visit:          Type: THERAPY  Noted: 2023    Patient seen for 12 sessions    Visit Diagnoses:    ICD-10-CM ICD-9-CM   1. Post-mastectomy lymphedema syndrome  I97.2 457.0     SUBJECTIVE     Subjective  She is happy that her arm looks so good today.    PAIN: 0/10         OBJECTIVE     Objective    Lymphedema       Row Name 23 0900             Subjective Pain    Able to rate subjective pain? yes  -HR      Pre-Treatment Pain Level 0  -HR         Manual Lymphatic Drainage    Manual Lymphatic Drainage initial sequence;opened regional lymph nodes;opened anastamoses;extremity treatment  -HR      Initial Sequence short neck;abdomen;diaphragmatic breathing  -HR      Supraclavicular left;right  -HR      Abdomen superficial;deep  -HR      Diaphragmatic Breathing x 9 with deep abdominals  -HR      Opened Regional Lymph Nodes axillary;inguinal  -HR      Inguinal left;right  -HR      Opened Anastamoses axillo-inguinal  -HR      Axillo-Inguinal left;right  -HR      Extremity Treatment MLD to full limb;extremity treatment focus on  -HR      MLD to Full Limb LUE  -HR      Manual Lymphatic Drainage Comments IASTM with small red therabar  -HR      Manual Therapy 60  -HR         Compression/Skin Care    Compression/Skin Care Comments Applied 3 fan cut kinesiotape to the L arm  -HR                User Key  (r) = Recorded By, (t) = Taken By, (c) = Cosigned By      Initials Name Provider Type    HR Kerline Good, PT, DPT, CLT-SALV[facility] Physical Therapist                      Therapy Education/Self Care 89571   Education offered today Use tape as needed   Medbridge Code    Ongoing HEP   Work on CDT   Timed  Minutes        Total Timed Treatment:     60   mins  Total Time of Visit:             60   mins         ASSESSMENT/PLAN     GOALS        Goals                                          Progress Note due by 6/23/23                                                      Recert due by 7/3/23   STG by: 6 weeks Comments Date Status   Patient will have a good basic understanding of lymphedema and its suggested risk reduction practices  Continue to educate 5/24 Ongoing   Patient will be independent with remedial HEP for lymphedema She is working on the HEP 5/24 Ongoing   Patient will be independent with self MLD for lymphedema She is working on the MLD and using the old Flexitouch 5/24 Ongoing             LTG by: 12 weeks         Patient will have appropriate compression garments for lymphedema maintenance  She will be measured for a new custom sleeve 6/23 5/24 Ongoing   Patient will have no sign or symptoms of infection No s/s of infection 5/24 Ongoing   Patient will be independent with comprehensive maintenance program for lymphedema Working towards her home program.Her  is doing well with assisting in bandaging 6/2 Ongoing   She will be able to reach across her body with LUE to perform daily hygiene with only minimal difficulty. She is now able to reach across her body for daily hygiene. 5/24 Met                              Assessment/Plan     ASSESSMENT:  Patient had her  wrap her arm last night and it looks much better. It is soft and has visible wrinkles especially along the forearm.    PLAN: Continue with CDT. Await decision from insurance about Flexitouch    SIGNATURE: Kerline Good, PT, DPT, [facility] License #: [id number]  Electronically Signed on 6/9/2023        [address] [place] [address]. [address]  [phone]

## 2023-06-16 ENCOUNTER — TREATMENT (OUTPATIENT)
Dept: PHYSICAL THERAPY | Facility: CLINIC | Age: 68
End: 2023-06-16
Payer: MEDICARE

## 2023-06-16 DIAGNOSIS — I97.2 POST-MASTECTOMY LYMPHEDEMA SYNDROME: Primary | ICD-10-CM

## 2023-06-16 PROCEDURE — 97140 MANUAL THERAPY 1/> REGIONS: CPT | Performed by: PHYSICAL THERAPIST

## 2023-06-16 NOTE — PROGRESS NOTES
"                                                                 Physical Therapy Treatment Note  [address] [place] [address]    Patient: Demi Morataya                                                 Visit Date: 2023  :     1955    Referring practitioner:    Denise Harrison MD  Date of Initial Visit:          Type: THERAPY  Noted: 2023    Patient seen for 13 sessions    Visit Diagnoses:    ICD-10-CM ICD-9-CM   1. Post-mastectomy lymphedema syndrome  I97.2 457.0     SUBJECTIVE     Subjective  She is excited about getting measured for new garments next week.    PAIN: 0/10         OBJECTIVE     Objective    Lymphedema       Row Name 23 0930             Subjective Pain    Able to rate subjective pain? yes  -HR      Pre-Treatment Pain Level 0  -HR         Manual Lymphatic Drainage    Manual Lymphatic Drainage initial sequence;opened regional lymph nodes;opened anastamoses;extremity treatment  -HR      Initial Sequence short neck;abdomen;diaphragmatic breathing  -HR      Supraclavicular left;right  -HR      Abdomen superficial;deep  -HR      Opened Regional Lymph Nodes axillary;inguinal  -HR      Inguinal left;right  -HR      Opened Anastamoses axillo-inguinal  -HR      Axillo-Inguinal left;right  -HR      Extremity Treatment MLD to full limb;extremity treatment focus on  -HR      MLD to Full Limb LUE  -HR      Manual Lymphatic Drainage Comments IASTM with small red therabar  -HR      Manual Therapy 60  -HR         Compression/Skin Care    Compression/Skin Care skin care;wrapping location;bandaging  -HR      Skin Care lotion applied  -HR      Wrapping Location upper extremity  -HR      Wrapping Location UE left:;hand to axilla  -HR      Wrapping Comments 4\" stockinette, Artiflex around wrist, rosidal soft for padding 1 roll. 6cm, 8cm figure 8, 10cm spiral.  -HR      Bandage Layers cotton liner;soft foam- 1/4 inch;short-stretch bandages (comment size/quantity)  -HR      Bandaging " Technique circumferential/spiral;figure-eight;moderate compression  -HR                User Key  (r) = Recorded By, (t) = Taken By, (c) = Cosigned By      Initials Name Provider Type    HR Kerline Good, PT, DPT, [facility] Physical Therapist                      Therapy Education/Self Care 72236   Education offered today Use tape as needed   Medbridge Code    Ongoing HEP   Work on CDT   Timed Minutes        Total Timed Treatment:     60   mins  Total Time of Visit:             60   mins         ASSESSMENT/PLAN     GOALS        Goals                                          Progress Note due by 6/23/23                                                      Recert due by 7/3/23   STG by: 6 weeks Comments Date Status   Patient will have a good basic understanding of lymphedema and its suggested risk reduction practices  Continue to educate 5/24 Ongoing   Patient will be independent with remedial HEP for lymphedema She is working on the HEP 5/24 Ongoing   Patient will be independent with self MLD for lymphedema She is working on the MLD and using the old Flexitouch 5/24 Ongoing             LTG by: 12 weeks         Patient will have appropriate compression garments for lymphedema maintenance  She will be measured for a new custom sleeve 6/23 5/24 Ongoing   Patient will have no sign or symptoms of infection No s/s of infection 5/24 Ongoing   Patient will be independent with comprehensive maintenance program for lymphedema Working towards her home program.Her  is doing well with assisting in bandaging 6/2 Ongoing   She will be able to reach across her body with LUE to perform daily hygiene with only minimal difficulty. She is now able to reach across her body for daily hygiene. 5/24 Met                              Assessment/Plan     ASSESSMENT:  Patient's arm still so much softer than it was on initial evaluation. Her entire personality is back to how she was with us before the arm got so out of control. She  is going to get measured for new custom compression next week. Hopefully she can get the new flexitouch soon as well to use at home.     PLAN: Continue with CDT. Await decision from insurance about Flexitouch. Needs more visits approved after next visit.     SIGNATURE: Kerline Good, PT, DPT, CLT[facility] License #: [id number]  Electronically Signed on 6/16/2023        [address] [place] [address]. [address]  [phone]

## 2023-06-19 ENCOUNTER — TREATMENT (OUTPATIENT)
Dept: PHYSICAL THERAPY | Facility: CLINIC | Age: 68
End: 2023-06-19
Payer: MEDICARE

## 2023-06-19 DIAGNOSIS — I97.2 POST-MASTECTOMY LYMPHEDEMA SYNDROME: Primary | ICD-10-CM

## 2023-06-19 NOTE — PROGRESS NOTES
30 Day Progress Note and 90 Day Recertification Addendum      Patient: Demi Morataya           : 1955  Visit Date: 2023  Referring practitioner: Denise Harrison MD  Date of Initial Visit: Type: THERAPY  Noted: 2023  Patient seen for 14 sessions  Visit Diagnoses:    ICD-10-CM ICD-9-CM   1. Post-mastectomy lymphedema syndrome  I97.2 457.0          Clinical Progress: improved  Home Program Compliance: Yes  Progress toward previous goals: Partially Met  Prognosis to achieve goals: good    Objective   See PTA note for goals  Assessment & Plan     Assessment  Impairments: abnormal or restricted ROM, lacks appropriate home exercise program and pain with function  Functional Limitations: lifting, pushing and reaching behind back  Assessment details: Patient has had a reduction of 4.4 cm as compared to her last visit. She has had a total reduction of 12.4 cm in the past 3 weeks. There is only a minimal amount of slightly dense tissue L forearm and one area at the wrist.  She is still waiting for insurance approval for the new Flexitouch.  Patient did have some L hand numbness with the shoulder stretches, this improved with the nerve glides. Overall patient has made great progress.     Plan  Therapy options: will be seen for skilled therapy services  Planned therapy interventions: manual therapy, joint mobilization, functional ROM exercises, therapeutic activities, soft tissue mobilization, stretching and compression  Frequency: 2x week  Duration in weeks: 8  Treatment plan discussed with: PTA      I reviewed the treatment and goals with Divina Robertson PTA and agree with the POC.    SIGNATURE: Odalis Palm PT, License #: [id number]  Electronically Signed on 2023      90 Day Recertification  Certification Period: 2023 through 2023  Based upon review of the patient's progress and continued therapy plan, it is my medical opinion that Demi Morataya should continue physical therapy  treatment at [facility]     PHYSICIAN: Denise Harrison MD (NPI: [id number])    Signature: __________________________________________________DATE: ___________________     Please sign and return via fax to [phone].   Thank you so much for letting us work with Demi. I appreciate your letting us work with your patients. If you have any questions or concerns, please don't hesitate to contact me.

## 2023-06-19 NOTE — PROGRESS NOTES
Physical Therapy Treatment Note, 30 Day Progress Note and 90 Day Recertification Note  [address] [place] [address]    Patient: Demi Morataya                                                 Visit Date: 2023  :     1955    Referring practitioner:    Denise Harrison MD  Date of Initial Visit:          Type: THERAPY  Noted: 2023    Patient seen for 14 sessions    Visit Diagnoses:    ICD-10-CM ICD-9-CM   1. Post-mastectomy lymphedema syndrome  I97.2 457.0     SUBJECTIVE     Subjective  She will be measured for her new garment Friday.  She has not heard anything about the pump.     PAIN: 0/10         OBJECTIVE     Objective    Lymphedema     Row Name 23 0915             Subjective Pain    Able to rate subjective pain? yes  -AL      Pre-Treatment Pain Level 0  -AL         Lymphedema Measurements    Measurement Type(s) Circumferential  -AL      Quick Girth Areas Upper extremities  -AL         LUE Quick Girth (cm)    Axilla 46 cm  -AL      Mid upper arm 42.5 cm  -AL      Elbow 35.9 cm  -AL      Mid forearm 30.6 cm  -AL      Wrist crease 20 cm  -AL      Web space 21.1 cm  -AL      Met-heads 20.1 cm  -AL      LUE Quick Girth Total 216.2  -AL         Manual Lymphatic Drainage    Manual Lymphatic Drainage initial sequence;opened regional lymph nodes;opened anastamoses;extremity treatment  -AL      Initial Sequence short neck;abdomen;diaphragmatic breathing  -AL      Supraclavicular left;right  -AL      Abdomen superficial;deep  -AL      Diaphragmatic Breathing x 9 with deep abdominals  -AL      Opened Regional Lymph Nodes axillary;inguinal  -AL      Inguinal left;right  -AL      Opened Anastamoses axillo-inguinal  -AL      Axillo-Inguinal left;right  -AL      Extremity Treatment MLD to full limb;extremity treatment focus on  -AL      MLD to Full Limb L UE  -AL      Manual Lymphatic Drainage Comments IASTM with the small and  "medium rollers to the L upper and L lower arm.  Also IASTM using the vibrating roller to the same areas.  IASTM with the small red roller and scar tissue massage L axilla.  -AL      Manual Therapy 70  -AL         Compression/Skin Care    Compression/Skin Care skin care;wrapping location;bandaging  -AL      Skin Care lotion applied  -AL      Wrapping Location upper extremity  -AL      Wrapping Location UE left:;hand to axilla  -AL      Wrapping Comments 4\" stockinette, Artiflex around wrist, rosidal soft for padding 1 roll. 6cm, 8cm figure 8, 10cm spiral.  -AL      Bandage Layers cotton liner;soft foam- 1/4 inch;short-stretch bandages (comment size/quantity)  -AL      Bandaging Technique circumferential/spiral;figure-eight;moderate compression  -AL      Compression/Skin Care Comments Kinesio tap applied:  Two \"I\" strips L tricep area, one fan cut stip one \"I\" strip L lower arm.  -AL            User Key  (r) = Recorded By, (t) = Taken By, (c) = Cosigned By    Initials Name Provider Type    Divina Flower, PTA, [facility] Physical Therapist Assistant                  Therapy Education/Self Care [id number]   Education offered today Use tape as needed   Medbridge Code    Ongoing HEP   Work on CDT   Timed Minutes        Total Timed Treatment:     60   mins  Total Time of Visit:             60   mins         ASSESSMENT/PLAN     GOALS        Goals                                          Progress Note due by 7/19/23                                                      Recert due by 9/17/23   STG by: 6 weeks Comments Date Status   Patient will have a good basic understanding of lymphedema and its suggested risk reduction practices  Continue to educate 6/19 Partially Met   Patient will be independent with remedial HEP for lymphedema She is working on the HEP 6/19 Met   Patient will be independent with self MLD for lymphedema She is working on the MLD and using the old Flexitouch 6/19 Partially Met             LTG by: 12 weeks     "     Patient will have appropriate compression garments for lymphedema maintenance  She will be measured for a new custom sleeve 6/23 6/19 Ongoing   Patient will have no sign or symptoms of infection No s/s of infection 6/19 Met   Patient will be independent with comprehensive maintenance program for lymphedema Trying to get a new Flexitouch pump. 6/19 Ongoing   She will be able to reach across her body with LUE to perform daily hygiene with only minimal difficulty. She is now able to reach across her body for daily hygiene. 5/24 Met                              Assessment/Plan     ASSESSMENT:  No significant change in size of the L UE as compared to her last visit.  She is still waiting on the approval for the Flexitouch, she will be fitted for a custom garment at the end of this week. The tissue remains soft L UE today.  She is happy with the results of her Lymphedema treatments.     PLAN: Continue with CDT. Await decision from insurance about Flexitouch.     SIGNATURE: Divina Robertson PTA, [place], [place] License #: [id number]  Electronically Signed on 6/19/2023        [facility] [address]  [phone]

## 2023-08-03 ENCOUNTER — TREATMENT (OUTPATIENT)
Dept: PHYSICAL THERAPY | Facility: CLINIC | Age: 68
End: 2023-08-03
Payer: MEDICARE

## 2023-08-03 DIAGNOSIS — I97.2 POST-MASTECTOMY LYMPHEDEMA SYNDROME: Primary | ICD-10-CM

## 2023-08-03 PROCEDURE — 97140 MANUAL THERAPY 1/> REGIONS: CPT | Performed by: PHYSICAL THERAPIST

## 2023-08-22 NOTE — PROGRESS NOTES
Addended by: KAROL MAYER on: 8/22/2023 03:02 PM     Modules accepted: SmartSet     Subjective:      Patient ID: Luis Benitez is a 66 y.o. female  [address], APRN - CNP  No ref. provider found    HPI  Chief Complaint   Patient presents with    Gastroesophageal Reflux     with Reza's esophagus    Abdominal Pain     F/u visit to schedule EGD. Patient diagnosed with Reza's esophagus one year ago. Biopsy neg for dysplasia. She has had some return of pain in RUQ of abdomen. 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 After this she did not have any pain for long time. Last year she was prescribed Levsin SL for the pain as this had helped in the past. Her insurance would not cover it anymore and she did not get the rx. She states reflux is well controlled. She tried taking H2 blocker due to concern for potential for risks associated with long term PPI use. This did not work as well for her. She is now taking omeprazole 20 mg daily. This works for her unless she eats trigger foods. She denies dysphagia, melena, n/v.   No anemia or weight loss. Last screening colonoscopy 4/26/2017 with 5 yr repeat screening recommended. Voices not lowe abdominal complaints.      Family History   Problem Relation Age of Onset    Colon Cancer Neg Hx     Colon Polyps Neg Hx     Liver Cancer Neg Hx     Liver Disease Neg Hx     Esophageal Cancer Neg Hx     Rectal Cancer Neg Hx     Stomach Cancer Neg Hx        Past Medical History:   Diagnosis Date    Acid reflux     Breast cancer ([address]     Dysplasia of cervix     Hyperlipidemia     Hypertension     Thyroid disease        Past Surgical History:   Procedure Laterality Date    BREAST RECONSTRUCTION  2010    BREAST RECONSTRUCTION  2011    BREAST RECONSTRUCTION  2011    CHOLECYSTECTOMY  2006    COLONOSCOPY  12/2011    Pt will see Dr. Saran Delacruz 2017    GYNECOLOGIC CRYOSURGERY  2014   Mack Fryer with retained ovaries    MASTECTOMY, BILATERAL  2008    OTHER SURGICAL HISTORY  2006    Sphincter ODODDI  MI COLONOSCOPY FLX DX W/COLLJ SPEC WHEN PFRMD N/A 4/26/2017    Dr Mart-diverticulosis, 5 yr recall    MI EGD TRANSORAL BIOPSY SINGLE/MULTIPLE N/A 4/11/2018    Dr Suzanna Bell (+) Reza's (-) dysplasia-Zuri (-)--1 yr recall       Current Outpatient Medications   Medication Sig Dispense Refill    omeprazole (PRILOSEC) 20 MG delayed release capsule Take 20 mg by mouth daily      hyoscyamine (LEVBID) 375 MCG extended release tablet Take 1 tablet by mouth every 12 hours as needed for Cramping or Diarrhea 60 tablet 5    bisoprolol (ZEBETA) 5 MG tablet       clobetasol (TEMOVATE) 0.05 % cream Apply topically 2 times daily. 1 Tube 2    pantoprazole (PROTONIX) 40 MG tablet Take 1 tablet by mouth daily Take daily first thing in the morning on an empty stomach. 90 tablet 3    hyoscyamine (LEVSIN/SL) 125 MCG sublingual tablet Place 1 tablet under the tongue every 4 hours as needed for Cramping 360 tablet 3    escitalopram (LEXAPRO) 10 MG tablet       ezetimibe (ZETIA) 10 MG tablet Take 10 mg by mouth daily      Cholecalciferol (VITAMIN D3) 2000 UNITS CAPS Take by mouth      aspirin 81 MG tablet Take 81 mg by mouth daily       No current facility-administered medications for this visit. Allergies   Allergen Reactions    Neosporin [Neomycin-Polymyxin-Gramicidin]     Penicillins     Sulfa Antibiotics     Zithromax [Azithromycin]         reports that she has never smoked. She has never used smokeless tobacco. She reports that she drinks alcohol. She reports that she does not use drugs. Review of Systems   Constitutional: Negative for appetite change, fever and unexpected weight change. HENT: Negative for sore throat and voice change. Respiratory: Negative for cough, chest tightness and shortness of breath. Cardiovascular: Negative for chest pain, palpitations and leg swelling. Gastrointestinal: Positive for abdominal pain.  Negative for abdominal distention, blood in stool, constipation, diarrhea, nausea, rectal pain and vomiting. Musculoskeletal: Negative for arthralgias, back pain and gait problem. Skin: Negative for pallor, rash and wound. Neurological: Negative for dizziness, weakness and light-headedness. Hematological: Negative for adenopathy. Does not bruise/bleed easily. All other systems reviewed and are negative. Objective:   Physical Exam   Constitutional: She is oriented to person, place, and time. She appears well-developed and well-nourished. No distress. /88   Pulse 61   Ht 5' 4\" (1.626 m)   Wt 245 lb 12.8 oz (111.5 kg)   SpO2 96%   BMI 42.19 kg/m²      Eyes: Conjunctivae are normal. No scleral icterus. Neck: No tracheal deviation present. Cardiovascular: Normal rate and regular rhythm. Exam reveals no gallop and no friction rub. No murmur heard. Pulmonary/Chest: Effort normal and breath sounds normal. No respiratory distress. She has no wheezes. She has no rhonchi. She has no rales. Abdominal: Soft. Normal appearance and bowel sounds are normal. She exhibits no distension and no mass. There is no hepatomegaly. There is no tenderness. There is no rebound and no guarding. Musculoskeletal: She exhibits no edema. Neurological: She is alert and oriented to person, place, and time. She has normal strength. Skin: Skin is warm, dry and intact. No cyanosis. No pallor. Psychiatric: She has a normal mood and affect. Her behavior is normal. Thought content normal. Cognition and memory are normal.       Assessment:      1. Reza's esophagus determined by biopsy    2. Chronic GERD    3. RUQ abdominal pain    4. History of sphincterotomy of sphincter of Oddi          Plan:      Schedule EGD  Biopsies for surveillance of Reza's    Nothing to eat or drink after midnight. No driving for 24 hours after procedure. Bring a  to procedure. No aspirin, NSAIDs, fish oil 5 days before procedure.     I have discussed the benefits, alternatives, and risks

## 2023-09-06 ENCOUNTER — TREATMENT (OUTPATIENT)
Dept: PHYSICAL THERAPY | Facility: CLINIC | Age: 68
End: 2023-09-06
Payer: MEDICARE

## 2023-09-06 DIAGNOSIS — I97.2 POST-MASTECTOMY LYMPHEDEMA SYNDROME: Primary | ICD-10-CM

## 2023-09-06 NOTE — PROGRESS NOTES
Physical Therapy Treatment Note, 30 Day Progress Note, and 90 Day Recertification Note  [address] [place] [address]    Patient: Demi Morataya                                                 Visit Date: 2023  :     1955    Referring practitioner:    Denise Harrison MD  Date of Initial Visit:          Type: THERAPY  Noted: 2023    Patient seen for 18 sessions    Visit Diagnoses:    ICD-10-CM ICD-9-CM   1. Post-mastectomy lymphedema syndrome  I97.2 457.0     SUBJECTIVE     Subjective  She has been out of town and knows the L arm is up in size.      PAIN: 5/10         OBJECTIVE     Objective    Lymphedema       Row Name 23 0915             Subjective Pain    Able to rate subjective pain? yes  -AL         Lymphedema Measurements    Measurement Type(s) Circumferential  -AL      Quick Girth Areas Upper extremities  -AL         LUE Quick Girth (cm)    Axilla 46.2 cm  -AL      Mid upper arm 42.8 cm  -AL      Elbow 35.7 cm  -AL      Mid forearm 33.6 cm  -AL      Wrist crease 21.2 cm  -AL      Web space 20.6 cm  -AL      Met-heads 20.9 cm  -AL      LUE Quick Girth Total 221  -AL         Manual Lymphatic Drainage    Manual Lymphatic Drainage initial sequence;opened regional lymph nodes;opened anastamoses;extremity treatment  -AL      Initial Sequence short neck;abdomen;diaphragmatic breathing  -AL      Supraclavicular left;right  -AL      Abdomen superficial;deep  -AL      Diaphragmatic Breathing x 9 with deep abdominals  -AL      Opened Regional Lymph Nodes axillary;inguinal  -AL      Inguinal left;right  -AL      Opened Anastamoses axillo-inguinal  -AL      Axillo-Inguinal left;right  -AL      Extremity Treatment MLD to full limb;extremity treatment focus on  -AL      MLD to Full Limb L UE  -AL      Manual Lymphatic Drainage Comments IASTM to the L lateral trunk and L UE with the hand held massager and small red roller. STM to  "the L upper traps and shoulder.  -AL      Manual Therapy 60  -AL         Compression/Skin Care    Compression/Skin Care compression garment  -AL      Compression Garment Comments She now has a custom [employer] CCL 2.  I helped patient don the compression sleeve, she donned the gauntlet.  -AL      Compression/Skin Care Comments --  -AL                User Key  (r) = Recorded By, (t) = Taken By, (c) = Cosigned By      Initials Name Provider Type    Divina Flower, PTA, [facility] Physical Therapist Assistant                  Therapeutic Activities    [id number] Comments   Checked the fit on the custom compression sleeve and gauntlet Patient needs assistance donning the sleeve.   Kinesiotape to the L UE:  Two \"I\" strips triceps area, one \"I\" strip bicep area, one fan cut from cubital fossa to dorsum of hand                Timed Minutes 10        Therapy Education/Self Care [id number]   Education offered today Use tape as needed, wear the new compression garments during the day, wrap the L UE at night.    Medbridge Code    Ongoing HEP   Work on CDT   Timed Minutes        Total Timed Treatment:     70   mins  Total Time of Visit:             70   mins         ASSESSMENT/PLAN     GOALS        Goals                                          Progress Note due by 10/5/23                                                      Recert due by 12/4/23   STG by: 6 weeks Comments Date Status   Patient will have a good basic understanding of lymphedema and its suggested risk reduction practices  She has a good understanding 8/3 Met   Patient will be independent with remedial HEP for lymphedema She is working on the HEP 6/19 Met   Patient will be independent with self MLD for lymphedema She now has the new Flexitouch pump. 8/3 Met             LTG by: 12 weeks         Patient will have appropriate compression garments for lymphedema maintenance The sleeve and gauntlet were a good fit today.  She now has a custom [employer] " CCL 2.   9/6 Progressing   Patient will have no sign or symptoms of infection No s/s of infection 6/19 Met   Patient will be independent with comprehensive maintenance program for lymphedema She has been using the new Flexitouch pump. 9/6 Progressing   She will be able to reach across her body with LUE to perform daily hygiene with only minimal difficulty. She is now able to reach across her body for daily hygiene. 5/24 Met                              Assessment/Plan     ASSESSMENT:  Patient has had an increase in size of the L UE of 6.5 cm as compared to her last treatment.  She has been out of town and was not able to wrap as much.  She continues to use the Flexitouch pump.  The custom sleeve and gauntlet are in and are both a good fit.  Will measure next treatment and assess how she is doing with the new garments.     PLAN: Continue with CDT.    SIGNATURE: Divina Robertson PTA, [place], [place] License #: [id number]  Electronically Signed on 9/6/2023        [address] [place] [address]. [address]  [phone]

## 2023-09-15 ENCOUNTER — TELEPHONE (OUTPATIENT)
Dept: HEMATOLOGY | Age: 68
End: 2023-09-15

## 2023-09-15 NOTE — TELEPHONE ENCOUNTER
I have spoken with patient regarding insurance coverage changes. I have explained to patient that she can file out a waiver through insurance for continuation of care. Patient has stated that she will be in contact with insurance company.

## 2023-09-27 ENCOUNTER — TREATMENT (OUTPATIENT)
Dept: PHYSICAL THERAPY | Facility: CLINIC | Age: 68
End: 2023-09-27
Payer: MEDICARE

## 2023-09-27 DIAGNOSIS — I97.2 POST-MASTECTOMY LYMPHEDEMA SYNDROME: Primary | ICD-10-CM

## 2023-09-27 NOTE — PROGRESS NOTES
Physical Therapy Treatment Note and 30 Day Progress Note  [address] [place] [address]    Patient: Demi Morataya                                                 Visit Date: 2023  :     1955    Referring practitioner:    Denise Harrison MD  Date of Initial Visit:          Type: THERAPY  Noted: 2023    Patient seen for 19 sessions    Visit Diagnoses:    ICD-10-CM ICD-9-CM   1. Post-mastectomy lymphedema syndrome  I97.2 457.0     SUBJECTIVE     Subjective  She has been doing everything she can to get the arm down. She hasn't been able to get the custom sleeve on since she was here last.    PAIN: 5/10         OBJECTIVE     Objective    Lymphedema       Row Name 23 1100 23 1000          Subjective Pain    Able to rate subjective pain? -- yes  -HR        Lymphedema Measurements    Measurement Type(s) Circumferential  -HR --     Quick Girth Areas Upper extremities  -HR --        LUE Quick Girth (cm)    Axilla 44.7 cm  -HR --     Mid upper arm 42.5 cm  -HR --     Elbow 36 cm  -HR --     Mid forearm 32 cm  -HR --     Wrist crease 20.8 cm  -HR --     Web space 20.8 cm  -HR --     Met-heads 20 cm  -HR --     LUE Quick Girth Total 216.8  -HR --        Manual Lymphatic Drainage    Manual Lymphatic Drainage initial sequence;opened regional lymph nodes;opened anastamoses;extremity treatment  -HR --     Initial Sequence short neck;abdomen;diaphragmatic breathing  -HR --     Supraclavicular left;right  -HR --     Abdomen superficial;deep  -HR --     Diaphragmatic Breathing x 9 with deep abdominals  -HR --     Opened Regional Lymph Nodes axillary;inguinal  -HR --     Inguinal left;right  -HR --     Opened Anastamoses axillo-inguinal  -HR --     Axillo-Inguinal left;right  -HR --     Extremity Treatment MLD to full limb;extremity treatment focus on  -HR --     MLD to Full Limb LUE  -HR --     Manual Lymphatic Drainage Comments  IASTM to the L lateral trunk and L UE with the hand held massager. STM to the L upper traps and shoulder.  -HR --        Compression/Skin Care    Compression/Skin Care compression garment  -HR --     Compression Garment Comments She now has a custom Mediven Mondi Esprit CCL 2. I helped patient don the compression sleeve, she donned the gauntlet.  -HR --               User Key  (r) = Recorded By, (t) = Taken By, (c) = Cosigned By      Initials Name Provider Type    HR Kerline Good, PT, DPT, CLT-SALVADOR Physical Therapist                  Therapeutic Activities    [id number] Comments   Checked the fit on the custom compression sleeve and gauntlet Patient needs assistance donning the sleeve.                   Timed Minutes 8        Therapy Education/Self Care 58099   Education offered today Use tape as needed, wear the new compression garments during the day, wrap the L UE at night.    Medbridge Code    Ongoing HEP   Work on CDT   Timed Minutes        Total Timed Treatment:     68   mins  Total Time of Visit:             68   mins         ASSESSMENT/PLAN     GOALS        Goals                                          Progress Note due by 10/26/23                                                      Recert due by 12/4/23   STG by: 6 weeks Comments Date Status   Patient will have a good basic understanding of lymphedema and its suggested risk reduction practices  She has a good understanding 8/3 Met   Patient will be independent with remedial HEP for lymphedema She is working on the HEP 6/19 Met   Patient will be independent with self MLD for lymphedema She now has the new Flexitouch pump. 8/3 Met             LTG by: 12 weeks         Patient will have appropriate compression garments for lymphedema maintenance She has not been able to get the garments on at home. We were able to get them on in the clinic today with moderate difficulty.   9/27 Progressing   Patient will have no sign or symptoms of infection No s/s of  infection 6/19 Met   Patient will be independent with comprehensive maintenance program for lymphedema She has been using the new Flexitouch pump.She has been wrapping at night. She hasn't been able to don the compression garments as they were so tight.  9/27 Progressing   She will be able to reach across her body with LUE to perform daily hygiene with only minimal difficulty. She is now able to reach across her body for daily hygiene. 5/24 Met                              Assessment & Plan       Assessment  Impairments: abnormal or restricted ROM, lacks appropriate home exercise program and pain with function   Functional limitations: lifting, pushing and reaching behind back     Plan  Therapy options: will be seen for skilled therapy services  Planned therapy interventions: manual therapy, joint mobilization, functional ROM exercises, therapeutic activities, soft tissue mobilization, stretching and compression  Frequency: 1x week  Duration in weeks: 8  Treatment plan discussed with: patient       ASSESSMENT:  Patient has had a reduction in the arm and we were able to get the sleeve on today along with the gauntlet. She hasn't been able to get the sleeve on at home since she was here. Hopefully, with the reduction in the arm, she can get the garments on at home daily now and she will continue to wrap at night. The garments were a good fit once they were on and felt very comfortable to her. Continued PT is needed to treat the Stage 2 lymphedema and make sure she can manage the compression garments for maintenance. The garments are essential to her long term maintenance to prevent progression again of the lymphedema as well as reduce infection risk and risk of hospitalization.     PLAN: Continue with CDT weekly    SIGNATURE: Kerline Good, PT, DPT, [facility] License #: [id number]  Electronically Signed on 9/27/2023        [facility]  [place] [address]  [phone]

## 2023-10-13 ENCOUNTER — TREATMENT (OUTPATIENT)
Dept: PHYSICAL THERAPY | Facility: CLINIC | Age: 68
End: 2023-10-13
Payer: MEDICARE

## 2023-10-13 DIAGNOSIS — I97.2 POST-MASTECTOMY LYMPHEDEMA SYNDROME: Primary | ICD-10-CM

## 2023-10-13 PROCEDURE — 97110 THERAPEUTIC EXERCISES: CPT | Performed by: PHYSICAL THERAPIST

## 2023-10-13 PROCEDURE — 97140 MANUAL THERAPY 1/> REGIONS: CPT | Performed by: PHYSICAL THERAPIST

## 2023-10-13 NOTE — PROGRESS NOTES
Physical Therapy Treatment Note  [address] [place] [address]    Patient: Demi Morataya                                                 Visit Date: 10/13/2023  :     1955    Referring practitioner:    Denise Harrison MD  Date of Initial Visit:          Type: THERAPY  Noted: 2023    Patient seen for 20 sessions    Visit Diagnoses:    ICD-10-CM ICD-9-CM   1. Post-mastectomy lymphedema syndrome  I97.2 457.0     SUBJECTIVE     Subjective  She still has not been able to wear the custom sleeve and gauntlet.  She has been wrapping the L UE and using the pump.     PAIN: 0/10         OBJECTIVE     Objective    Lymphedema       Row Name 10/13/23 0800             Subjective Pain    Able to rate subjective pain? yes  -AL      Pre-Treatment Pain Level 0  -AL         Lymphedema Measurements    Measurement Type(s) Circumferential  -AL      Quick Girth Areas Upper extremities  -AL         LUE Quick Girth (cm)    Axilla 46.1 cm  -AL      Mid upper arm 42.8 cm  -AL      Elbow 34.6 cm  -AL      Mid forearm 31.1 cm  -AL      Wrist crease 21.1 cm  -AL      Web space 21.1 cm  -AL      Met-heads 20.6 cm  -AL      LUE Quick Girth Total 217.4  -AL         Manual Lymphatic Drainage    Manual Lymphatic Drainage initial sequence;opened regional lymph nodes;opened anastamoses;extremity treatment  -AL      Initial Sequence short neck;abdomen;diaphragmatic breathing  -AL      Supraclavicular left;right  -AL      Abdomen superficial;deep  -AL      Diaphragmatic Breathing x 9 with deep abdominals  -AL      Opened Regional Lymph Nodes axillary;inguinal  -AL      Inguinal left;right  -AL      Opened Anastamoses axillo-inguinal  -AL      Axillo-Inguinal left;right  -AL      Extremity Treatment MLD to full limb;extremity treatment focus on  -AL      MLD to Full Limb LUE  -AL      Manual Lymphatic Drainage Comments IASTM with the hand held massager and small roller  "to the L upper arm and lateral trunk.  -AL      Manual Therapy 60  -AL         Compression/Skin Care    Compression/Skin Care compression garment  -AL      Compression Garment Comments Donned her custom Mediven Gregorio Normant CCL 2, she donned the gauntlet.  Made sure there were no wrinkles at the wrist.  -AL      Compression/Skin Care Comments Gave patient a new set of short stretch bandages and a roll of Comprifoam.  -AL                User Key  (r) = Recorded By, (t) = Taken By, (c) = Cosigned By      Initials Name Provider Type    Divina Flower, PTA, CLT-SALVADOR Physical Therapist Assistant                          Therapeutic Exercises    98954 Units Comments   Vibraflex 10 Hz 1.5 min Squat   Standing hip abduction X 20    Standing hip extension X 20    Arm wall slides X 20    Taped the L UE with Kinesio tape  Two \"I\" strips along the triceps, one \"I\" strip L medial arm and forearm, two fan cut dorsum of arm and forearm.    Timed Minutes 15        Therapy Education/Self Care 13057   Education offered today Use tape as needed, wear the new compression garments during the day, wrap the L UE at night.    Medbridge Code    Ongoing HEP   Work on CDT   Timed Minutes        Total Timed Treatment:    75   mins  Total Time of Visit:            75   mins         ASSESSMENT/PLAN     GOALS        Goals                                          Progress Note due by 10/26/23                                                      Recert due by 12/4/23   STG by: 6 weeks Comments Date Status   Patient will have a good basic understanding of lymphedema and its suggested risk reduction practices  She has a good understanding 8/3 Met   Patient will be independent with remedial HEP for lymphedema She is working on the HEP 6/19 Met   Patient will be independent with self MLD for lymphedema She now has the new Flexitouch pump. 8/3 Met             LTG by: 12 weeks         Patient will have appropriate compression garments for lymphedema " maintenance She will have her  don the compression sleeve with gloves on. 10/13 Progressing   Patient will have no sign or symptoms of infection No s/s of infection 6/19 Met   Patient will be independent with comprehensive maintenance program for lymphedema She has been using the new Flexitouch pump.She has been wrapping at night. She hasn't been able to don the compression garments as they were so tight.  9/27 Progressing   She will be able to reach across her body with LUE to perform daily hygiene with only minimal difficulty. She is now able to reach across her body for daily hygiene. 5/24 Met                              Assessment/Plan     ASSESSMENT:  No significant change in the overall size of the L UE but locally the proximal arm is up.  She has not been able to don the compression garment at home, her  with wear gloves when trying to don the garment.  Her short stretch bandages are stretched out so I gave her a replacement set. I was able to don the garment with minimal difficulty, I also taped the L UE today to help move the fluid out of the arm.  She did not have any issues with the Vibraflex.     PLAN: Continue with CDT weekly    SIGNATURE: Divina Robertson PTA, [facility] License #: [id number]  Electronically Signed on 10/13/2023        [address] [place] [address]. [address]  [phone]

## 2023-10-16 ENCOUNTER — TREATMENT (OUTPATIENT)
Dept: PHYSICAL THERAPY | Facility: CLINIC | Age: 68
End: 2023-10-16
Payer: MEDICARE

## 2023-10-16 DIAGNOSIS — I97.2 POST-MASTECTOMY LYMPHEDEMA SYNDROME: Primary | ICD-10-CM

## 2023-10-16 PROCEDURE — 97140 MANUAL THERAPY 1/> REGIONS: CPT | Performed by: PHYSICAL THERAPIST

## 2023-10-16 PROCEDURE — 97110 THERAPEUTIC EXERCISES: CPT | Performed by: PHYSICAL THERAPIST

## 2023-10-16 NOTE — PROGRESS NOTES
Physical Therapy Treatment Note  [address] [place] [address]    Patient: Demi Morataya                                                 Visit Date: 10/16/2023  :     1955    Referring practitioner:    Denise Harrison MD  Date of Initial Visit:          Type: THERAPY  Noted: 2023    Patient seen for 21 sessions    Visit Diagnoses:    ICD-10-CM ICD-9-CM   1. Post-mastectomy lymphedema syndrome  I97.2 457.0     SUBJECTIVE     Subjective  She states the custom gauntlet made her hand swell.  She does have another gauntlet that works better. She could tell the wraps were new. She is having tightness and pain in the L shoulder, she can not relate this to any activity.     PAIN: 7/10  L shoulder         OBJECTIVE     Objective    Lymphedema       Row Name 10/16/23 1100             Subjective Pain    Able to rate subjective pain? yes  -AL      Pre-Treatment Pain Level 0  -AL         Lymphedema Measurements    Measurement Type(s) Circumferential  -AL      Quick Girth Areas Upper extremities  -AL         LUE Quick Girth (cm)    Axilla 48.1 cm  -AL      Mid upper arm 42.3 cm  -AL      Elbow 35.5 cm  -AL      Mid forearm 34 cm  -AL      Wrist crease 21 cm  -AL      Web space 21.1 cm  -AL      Met-heads 19.9 cm  -AL      LUE Quick Girth Total 221.9  -AL         Manual Lymphatic Drainage    Manual Lymphatic Drainage initial sequence;opened regional lymph nodes;opened anastamoses;extremity treatment  -AL      Initial Sequence short neck;abdomen;diaphragmatic breathing  -AL      Supraclavicular left;right  -AL      Abdomen superficial;deep  -AL      Diaphragmatic Breathing x 9 with deep abdominals  -AL      Opened Regional Lymph Nodes axillary;inguinal  -AL      Inguinal left;right  -AL      Opened Anastamoses axillo-inguinal  -AL      Axillo-Inguinal left;right  -AL      Extremity Treatment MLD to full limb;extremity treatment focus on  -AL    "   MLD to Full Limb LUE  -AL      Manual Lymphatic Drainage Comments IASTM with the hand held massager and small roller to the L upper arm and lateral trunk.  -AL      Manual Therapy 60  -AL         Compression/Skin Care    Compression/Skin Care skin care;wrapping location;bandaging  -AL      Skin Care lotion applied  -AL      Wrapping Location upper extremity  -AL      Wrapping Location UE left:;hand to axilla  -AL      Wrapping Comments 4\" stockinette, Artiflex around wrist, rosidal soft for padding 1 roll. 6cm, 8cm figure 8, 10cm spiral.  -AL      Bandage Layers cotton liner;soft foam- 1/4 inch;short-stretch bandages (comment size/quantity)  -AL      Bandaging Technique circumferential/spiral;figure-eight;moderate compression  -AL      Compression/Skin Care Comments Try the custom sleeve and gauntlet when the wraps are removed.  -AL                User Key  (r) = Recorded By, (t) = Taken By, (c) = Cosigned By      Initials Name Provider Type    Divina Flower, RENNY, YOANA Physical Therapist Assistant                            Therapeutic Exercises    86454 Units Comments   Vibraflex 10 Hz 1.5 min Squat   Standing hip abduction X 20    Standing hip extension X 20    Arm wall slides X 20    Cut pieces of Kinesio tape for patient's  to replace tape this week.     Timed Minutes 15        Therapy Education/Self Care 79183   Education offered today Use tape as needed, wear the new compression garments during the day, wrap the L UE at night.    Medbridge Code    Ongoing HEP   Work on CDT   Timed Minutes        Total Timed Treatment:    75   mins  Total Time of Visit:            75   mins         ASSESSMENT/PLAN     GOALS        Goals                                          Progress Note due by 10/26/23                                                      Recert due by 12/4/23   STG by: 6 weeks Comments Date Status   Patient will have a good basic understanding of lymphedema and its suggested risk reduction " practices  She has a good understanding 8/3 Met   Patient will be independent with remedial HEP for lymphedema She is working on the HEP 6/19 Met   Patient will be independent with self MLD for lymphedema She now has the new Flexitouch pump. 8/3 Met             LTG by: 12 weeks         Patient will have appropriate compression garments for lymphedema maintenance She custom gauntlet has been too tight. 10/16 Progressing   Patient will have no sign or symptoms of infection No s/s of infection 6/19 Met   Patient will be independent with comprehensive maintenance program for lymphedema She has been using the new Flexitouch pump.She has been wrapping at night. She hasn't been able to don the compression garments as they were so tight.  9/27 Progressing   She will be able to reach across her body with LUE to perform daily hygiene with only minimal difficulty. She is now able to reach across her body for daily hygiene. 5/24 Met                              Assessment/Plan     ASSESSMENT:  Patient has had an increase in size of the L UE as compared to her last measurements. Since she had an increase in the L arm I did wrap the L UE today.  I am  hoping this will help to reduce the size of the L UE so she can wear the custom garments again.      PLAN: Continue with CDT weekly    SIGNATURE: Divina Robertson PTA, [facility] [place] License #: [id number]  Electronically Signed on 10/16/2023        [address] [place] [address]. [address]  [phone]

## 2023-10-24 ENCOUNTER — TREATMENT (OUTPATIENT)
Dept: PHYSICAL THERAPY | Facility: CLINIC | Age: 68
End: 2023-10-24
Payer: MEDICARE

## 2023-10-24 DIAGNOSIS — I97.2 POST-MASTECTOMY LYMPHEDEMA SYNDROME: Primary | ICD-10-CM

## 2023-10-24 PROCEDURE — 97140 MANUAL THERAPY 1/> REGIONS: CPT | Performed by: PHYSICAL THERAPIST

## 2023-10-27 NOTE — PROGRESS NOTES
Physical Therapy Treatment Note  [address] [place] [place] [address]    Patient: Demi Morataya                                                 Visit Date: 10/24/2023  :     1955    Referring practitioner:    Denise Harrison MD  Date of Initial Visit:          Type: THERAPY  Noted: 2023    Patient seen for 22 sessions    Visit Diagnoses:    ICD-10-CM ICD-9-CM   1. Post-mastectomy lymphedema syndrome  I97.2 457.0     SUBJECTIVE     Subjective  She has been able to wear the sleeve but it is coming up pretty high into her axilla and kind of rubbing. She is going to try to just put some soft cotton in it.      PAIN: 5/10  L shoulder         OBJECTIVE     Objective        10/24/23 1000   Subjective Pain   Able to rate subjective pain? yes   Pre-Treatment Pain Level 0   Manual Lymphatic Drainage   Manual Lymphatic Drainage initial sequence;opened regional lymph nodes;opened anastamoses;extremity treatment   Initial Sequence short neck;abdomen;diaphragmatic breathing   Supraclavicular left;right   Abdomen superficial;deep   Opened Regional Lymph Nodes axillary;inguinal   Inguinal left;right   Opened Anastamoses axillo-inguinal   Axillo-Inguinal left;right   Extremity Treatment MLD to full limb;extremity treatment focus on   MLD to Full Limb LUE   Manual Lymphatic Drainage Comments IASTM with the hand held massager and small roller to the L upper arm and lateral trunk.   Manual Therapy 60   Compression/Skin Care   Compression Garment Comments She wore the garments to tx today   Compression/Skin Care Comments Helped her don the compression sleeve and gauntlet                    Therapy Education/Self Care 27015   Education offered today Use tape as needed, wear the new compression garments during the day, wrap the L UE at night.    Medbridge Code    Ongoing HEP   Work on CDT   Timed Minutes        Total Timed Treatment:    60   mins  Total Time  ----- Message from Talat Marcano DO sent at 1/9/2021  9:59 PM CST -----  Thyroid function is stable and in normal range. Continue current levothyroxine. Stable anemia is seen (low hemoglobin level) likely due to underlying known thalassemia. The blood counts are stable.   of Visit:            60   mins         ASSESSMENT/PLAN     GOALS        Goals                                          Progress Note due by 11/23/23                                                      Recert due by 12/4/23   STG by: 6 weeks Comments Date Status   Patient will have a good basic understanding of lymphedema and its suggested risk reduction practices  She has a good understanding 8/3 Met   Patient will be independent with remedial HEP for lymphedema She is working on the HEP 6/19 Met   Patient will be independent with self MLD for lymphedema She now has the new Flexitouch pump. 8/3 Met             LTG by: 12 weeks         Patient will have appropriate compression garments for lymphedema maintenance She custom gauntlet has been too tight. 10/24 Progressing   Patient will have no sign or symptoms of infection No s/s of infection 6/19 Met   Patient will be independent with comprehensive maintenance program for lymphedema She has been using the new Flexitouch pump.She has been wrapping at night. She is wearing the custom sleeve 10/24 Progressing   She will be able to reach across her body with LUE to perform daily hygiene with only minimal difficulty. She is now able to reach across her body for daily hygiene. 5/24 Met                              Assessment & Plan       Assessment  Impairments: abnormal or restricted ROM, lacks appropriate home exercise program and pain with function   Functional limitations: lifting, pushing and reaching behind back     Plan  Therapy options: will be seen for skilled therapy services  Planned therapy interventions: manual therapy, joint mobilization, functional ROM exercises, therapeutic activities, soft tissue mobilization, stretching and compression  Frequency: 1x week  Duration in weeks: 4  Treatment plan discussed with: patient         ASSESSMENT:  Patient has been able to get into the custom sleeve now. She continues to do her HEP.    PLAN: Continue with CDT  weekly    SIGNATURE: Kerline Good, PT, DPT, CLT-MADI FRANCO License #: [id number]  Electronically Signed on 10/27/2023        [address] [facility] [place] [address]  [phone]

## 2023-10-31 ENCOUNTER — TREATMENT (OUTPATIENT)
Dept: PHYSICAL THERAPY | Facility: CLINIC | Age: 68
End: 2023-10-31
Payer: MEDICARE

## 2023-10-31 DIAGNOSIS — I97.2 POST-MASTECTOMY LYMPHEDEMA SYNDROME: Primary | ICD-10-CM

## 2023-10-31 PROCEDURE — 97140 MANUAL THERAPY 1/> REGIONS: CPT | Performed by: PHYSICAL THERAPIST

## 2023-10-31 PROCEDURE — 97535 SELF CARE MNGMENT TRAINING: CPT | Performed by: PHYSICAL THERAPIST

## 2023-10-31 NOTE — PROGRESS NOTES
Physical Therapy Treatment Note  [address] [place] [address]    Patient: Demi Morataya                                                 Visit Date: 10/31/2023  :     1955    Referring practitioner:    Denise Harrison MD  Date of Initial Visit:          Type: THERAPY  Noted: 2023    Patient seen for 23 sessions    Visit Diagnoses:    ICD-10-CM ICD-9-CM   1. Post-mastectomy lymphedema syndrome  I97.2 457.0     SUBJECTIVE     Subjective  She has had pain L lateral trunk, she does not have pain today.  She has been able to wear the compression sleeve most of the week, she wrapped the last couple days. She still gets an area at the forearm from the gauntlet.  She also states the gauntlet slips down.     PAIN: 0/10  L shoulder         OBJECTIVE     Objective    Lymphedema       Row Name 10/31/23 0915             Subjective Pain    Able to rate subjective pain? yes  -AL      Pre-Treatment Pain Level 0  -AL         Lymphedema Measurements    Measurement Type(s) Circumferential  -AL      Quick Girth Areas Upper extremities  -AL         LUE Quick Girth (cm)    Axilla 46.5 cm  -AL      Mid upper arm 44.6 cm  -AL      Elbow 36.6 cm  -AL      Mid forearm 34 cm  -AL      Wrist crease 22 cm  -AL      Web space 21.4 cm  -AL      Met-heads 19.9 cm  -AL      LUE Quick Girth Total 225  -AL         Manual Lymphatic Drainage    Manual Lymphatic Drainage initial sequence;opened regional lymph nodes;opened anastamoses;extremity treatment  -AL      Initial Sequence short neck;abdomen;diaphragmatic breathing  -AL      Supraclavicular left;right  -AL      Abdomen superficial;deep  -AL      Diaphragmatic Breathing x 9 with deep abdominals  -AL      Opened Regional Lymph Nodes axillary;inguinal  -AL      Inguinal left;right  -AL      Opened Anastamoses axillo-inguinal  -AL      Axillo-Inguinal left;right  -AL      Extremity Treatment MLD to full  "limb;extremity treatment focus on  -AL      MLD to Full Limb LUE  -AL      Manual Lymphatic Drainage Comments IASTM with the hand held massager and small roller to the L upper arm and lateral trunk and L UE.  -AL      Manual Therapy 60  -AL         Compression/Skin Care    Compression/Skin Care skin care;wrapping location;bandaging  -AL      Skin Care lotion applied  -AL      Wrapping Location upper extremity  -AL      Wrapping Location UE left:;hand to axilla  -AL      Wrapping Comments 4\" stockinette, Artiflex around wrist, rosidal soft for padding 1 roll. 6cm, 8cm figure 8, 10cm spiral.  -AL      Bandage Layers cotton liner;soft foam- 1/4 inch;short-stretch bandages (comment size/quantity)  -AL      Bandaging Technique circumferential/spiral;figure-eight;moderate compression  -AL      Compression/Skin Care Comments Try the custom sleeve and gauntlet when the wraps are removed.  -AL                User Key  (r) = Recorded By, (t) = Taken By, (c) = Cosigned By      Initials Name Provider Type    AL Divina Robertson, PTA, CLT-SALVADOR Physical Therapist Assistant                  Therapeutic Exercises    84380 Units Comments   Taped the L UE with Kinesio tape   Two \"I\" strips along the triceps, one fan cut dorsum of arm and forearm.                         Timed Minutes 10           Therapy Education/Self Care 73630   Education offered today Use tape as needed, wear the new compression garments during the day, wrap the L UE at night.    Medbridge Code    Ongoing HEP   Work on CDT   Timed Minutes        Total Timed Treatment:    70   mins  Total Time of Visit:            70   mins         ASSESSMENT/PLAN     GOALS        Goals                                          Progress Note due by 11/23/23                                                      Recert due by 12/4/23   STG by: 6 weeks Comments Date Status   Patient will have a good basic understanding of lymphedema and its suggested risk reduction practices  She has a " good understanding 8/3 Met   Patient will be independent with remedial HEP for lymphedema She is working on the HEP 6/19 Met   Patient will be independent with self MLD for lymphedema She now has the new Flexitouch pump. 8/3 Met             LTG by: 12 weeks         Patient will have appropriate compression garments for lymphedema maintenance She has been able to wear the sleeve and gauntlet.  The gauntlet slips down. 10/31 Progressing   Patient will have no sign or symptoms of infection No s/s of infection 6/19 Met   Patient will be independent with comprehensive maintenance program for lymphedema She has been using the new Flexitouch pump.She has been wrapping at night. She is wearing the custom sleeve 10/24 Progressing   She will be able to reach across her body with LUE to perform daily hygiene with only minimal difficulty. She is now able to reach across her body for daily hygiene. 5/24 Met                              Assessment/Plan     ASSESSMENT:  Patient has had an increase of 3.1 cm in the L UE as compared to her last visit.  I did wrap the L UE today so she will be able to fit into her custom sleeve better.      PLAN: Continue with CDT weekly, discuss options for different bras.      SIGNATURE: Divina Robertson PTA, [place], [place] License #: [id number]  Electronically Signed on 10/31/2023        [facility] [place] [address]  [phone]   Epidural

## 2023-11-06 ENCOUNTER — TREATMENT (OUTPATIENT)
Dept: PHYSICAL THERAPY | Facility: CLINIC | Age: 68
End: 2023-11-06
Payer: MEDICARE

## 2023-11-06 DIAGNOSIS — I97.2 POST-MASTECTOMY LYMPHEDEMA SYNDROME: Primary | ICD-10-CM

## 2023-11-06 PROCEDURE — 97140 MANUAL THERAPY 1/> REGIONS: CPT | Performed by: PHYSICAL THERAPIST

## 2023-11-06 PROCEDURE — 97110 THERAPEUTIC EXERCISES: CPT | Performed by: PHYSICAL THERAPIST

## 2023-11-06 NOTE — PROGRESS NOTES
Physical Therapy Treatment Note  [address] [place] [address]    Patient: Demi Morataya                                                 Visit Date: 2023  :     1955    Referring practitioner:    Denise Harrison MD  Date of Initial Visit:          Type: THERAPY  Noted: 2023    Patient seen for 24 sessions    Visit Diagnoses:    ICD-10-CM ICD-9-CM   1. Post-mastectomy lymphedema syndrome  I97.2 457.0     SUBJECTIVE     Subjective  She states she has been wearing the compression bandages.      PAIN: 5/10  L lateral trunk         OBJECTIVE     Objective    Lymphedema       Row Name 23 0930             Subjective Pain    Able to rate subjective pain? yes  -AL      Pre-Treatment Pain Level 5  -AL         Lymphedema Measurements    Measurement Type(s) Circumferential  -AL      Quick Girth Areas Upper extremities  -AL         LUE Quick Girth (cm)    Axilla 47.5 cm  -AL      Mid upper arm 43.5 cm  -AL      Elbow 37 cm  -AL      Mid forearm 31.2 cm  -AL      Wrist crease 20.6 cm  -AL      Web space 20.9 cm  -AL      Met-heads 19.8 cm  -AL      LUE Quick Girth Total 220.5  -AL         Manual Lymphatic Drainage    Manual Lymphatic Drainage initial sequence;opened regional lymph nodes;opened anastamoses;extremity treatment  -AL      Initial Sequence short neck;abdomen;diaphragmatic breathing  -AL      Supraclavicular left;right  -AL      Abdomen superficial;deep  -AL      Diaphragmatic Breathing x 9 with deep abdominals  -AL      Opened Regional Lymph Nodes axillary;inguinal  -AL      Inguinal left;right  -AL      Opened Anastamoses axillo-inguinal  -AL      Axillo-Inguinal left;right  -AL      Extremity Treatment MLD to full limb;extremity treatment focus on  -AL      MLD to Full Limb LUE  -AL      Manual Lymphatic Drainage Comments IASTM with the hand held massager and small roller to the L upper arm and lateral trunk and L UE.   "-AL      Manual Therapy 60  -AL         Compression/Skin Care    Compression/Skin Care compression garment  -AL      Compression Garment Comments Helped her don the compression sleeve, she did don the gauntlet first.  We used skinmaterial tape to help keep the gauntlet in place, it did not stick to the material very well.  -AL                User Key  (r) = Recorded By, (t) = Taken By, (c) = Cosigned By      Initials Name Provider Type    Divina Flower, PTA, CLT-SALVADOR Physical Therapist Assistant                    Therapeutic Exercises    42753 Units Comments   Taped the L UE with Kinesio tape   Two \"I\" strips along the triceps, One \"I\" strip over the bicep, one fan cut dorsum of arm and forearm.     Vibraflex 12 Hz 2 min    Standing hip flex X 20    Standing hip extension X 20    Standing B wall arm slides X 20    Timed Minutes 15           Therapy Education/Self Care 96689   Education offered today Use tape as needed, wear the new compression garments during the day, wrap the L UE at night.    Medbridge Code    Ongoing HEP   Work on CDT   Timed Minutes        Total Timed Treatment:    75   mins  Total Time of Visit:            75   mins         ASSESSMENT/PLAN     GOALS        Goals                                          Progress Note due by 11/23/23                                                      Recert due by 12/4/23   STG by: 6 weeks Comments Date Status   Patient will have a good basic understanding of lymphedema and its suggested risk reduction practices  She has a good understanding 8/3 Met   Patient will be independent with remedial HEP for lymphedema She is working on the HEP 6/19 Met   Patient will be independent with self MLD for lymphedema She now has the new Flexitouch pump. 8/3 Met             LTG by: 12 weeks         Patient will have appropriate compression garments for lymphedema maintenance She has been able to wear the sleeve and gauntlet.  The gauntlet slips down. 11/6 Met   Patient " will have no sign or symptoms of infection No s/s of infection 6/19 Met   Patient will be independent with comprehensive maintenance program for lymphedema She is independent with her home maintenance program. 11/6 Met   She will be able to reach across her body with LUE to perform daily hygiene with only minimal difficulty. She is now able to reach across her body for daily hygiene. 5/24 Met                              Assessment/Plan     ASSESSMENT:  Patient has had a reduction of 4.5 cm in the L UE as compared to her last visit.  She was able to wear the gauntlet and the sleeve today after her treatment.  We did try the taping the garment to the skin, the tape would not stay on the material.  The tape rolled off of the material.  She is independent with her home maintenance program and has met all of her goals.  She will look into different bras such as the Alena bra and the Leonisa bra.     PLAN: Will place patient on hold, if we do not hear from her in the next few weeks will d/c.      SIGNATURE: Divina Robertson PTA, [facility] [place] License #: [id number]  Electronically Signed on 11/6/2023        [address] [place] [address]. [id number]  [phone]

## 2024-03-04 ENCOUNTER — TELEPHONE (OUTPATIENT)
Dept: HEMATOLOGY | Age: 69
End: 2024-03-04

## 2024-03-04 NOTE — TELEPHONE ENCOUNTER
Called patient to remind them of their appointment on 03/06/2024 but was unable to leave vm due to mailbox being full or not set up or memory is full.

## 2024-03-04 NOTE — PROGRESS NOTES
GASTROINTESTINAL ENDOSCOPY N/A 05/30/2019    Dr VIRGINIE Beatty-Gastropathy, (+) Reza's, (-) dysplasia--1 yr recall    UPPER GASTROINTESTINAL ENDOSCOPY N/A 11/09/2022    Dr VIRGINIE Beatty (+) Reza's, gastritis, 3 yr recall       Social History:    Marital status, children: , 2 children  Smoking status: Never smoker  ETOH status: One desmond monthly  Resides: [place], [place]    Family History:   Family History   Problem Relation Age of Onset    Kidney Disease Mother     Cancer Father 40        Lymphoma    No Known Problems Sister     No Known Problems Brother     Hypertension Maternal Grandmother     Stroke Maternal Grandfather     Cancer Paternal Grandmother     Alzheimer's Disease Paternal Grandfather     Colon Cancer Neg Hx     Colon Polyps Neg Hx     Liver Cancer Neg Hx     Liver Disease Neg Hx     Esophageal Cancer Neg Hx     Rectal Cancer Neg Hx     Stomach Cancer Neg Hx        Current Hospital Medications:    Current Outpatient Medications   Medication Sig Dispense Refill    ferrous sulfate (FE TABS 325) 325 (65 Fe) MG EC tablet Take 1 tablet by mouth daily      bisoprolol (ZEBETA) 10 MG tablet Take 1 tablet by mouth daily      escitalopram (LEXAPRO) 10 MG tablet Take 1 tablet by mouth daily      polycarbophil (FIBERCON) 625 MG tablet Take 2 tablets by mouth every evening      levothyroxine (SYNTHROID) 50 MCG tablet Take 1 tablet by mouth Daily      omeprazole (PRILOSEC) 20 MG delayed release capsule Take 1 capsule by mouth daily      ezetimibe (ZETIA) 10 MG tablet Take 1 tablet by mouth daily      Cholecalciferol (VITAMIN D3) 2000 UNITS CAPS Take by mouth       No current facility-administered medications for this visit.       Allergies:   Allergies   Allergen Reactions    Neosporin [Neomycin-Polymyxin-Gramicidin]     Penicillins     Sulfa Antibiotics     Zithromax [Azithromycin]      Subjective   REVIEW OF SYSTEMS:   CONSTITUTIONAL: no fever, no night sweats, no fatigue;  HEENT: no blurring of vision, no

## 2024-03-06 ENCOUNTER — OFFICE VISIT (OUTPATIENT)
Dept: HEMATOLOGY | Age: 69
End: 2024-03-06
Payer: MEDICARE

## 2024-03-06 ENCOUNTER — HOSPITAL ENCOUNTER (OUTPATIENT)
Dept: INFUSION THERAPY | Age: 69
Discharge: HOME | End: 2024-03-06
Payer: MEDICARE

## 2024-03-06 VITALS
TEMPERATURE: 97.5 F | SYSTOLIC BLOOD PRESSURE: 128 MMHG | DIASTOLIC BLOOD PRESSURE: 82 MMHG | HEART RATE: 59 BPM | WEIGHT: 271 LBS | BODY MASS INDEX: 46.26 KG/M2 | OXYGEN SATURATION: 98 % | HEIGHT: 64 IN

## 2024-03-06 DIAGNOSIS — C50.912 INVASIVE DUCTAL CARCINOMA OF BREAST, LEFT (HCC): Primary | ICD-10-CM

## 2024-03-06 DIAGNOSIS — Z71.89 CARE PLAN DISCUSSED WITH PATIENT: ICD-10-CM

## 2024-03-06 DIAGNOSIS — Z08 ENCOUNTER FOR FOLLOW-UP SURVEILLANCE OF BREAST CANCER: ICD-10-CM

## 2024-03-06 DIAGNOSIS — C50.912 INVASIVE DUCTAL CARCINOMA OF BREAST, LEFT (HCC): ICD-10-CM

## 2024-03-06 DIAGNOSIS — Z85.3 ENCOUNTER FOR FOLLOW-UP SURVEILLANCE OF BREAST CANCER: ICD-10-CM

## 2024-03-06 LAB
ALBUMIN SERPL-MCNC: 3.9 G/DL (ref 3.5–5.2)
ALP SERPL-CCNC: 66 U/L (ref 35–104)
ALT SERPL-CCNC: 17 U/L (ref 9–52)
ANION GAP SERPL CALCULATED.3IONS-SCNC: 10 MMOL/L (ref 7–19)
AST SERPL-CCNC: 18 U/L (ref 14–36)
BASOPHILS # BLD: 0.05 K/UL (ref 0.01–0.08)
BASOPHILS NFR BLD: 0.6 % (ref 0.1–1.2)
BILIRUB SERPL-MCNC: 0.9 MG/DL (ref 0.2–1.3)
BUN SERPL-MCNC: 17 MG/DL (ref 7–17)
CALCIUM SERPL-MCNC: 8.8 MG/DL (ref 8.4–10.2)
CHLORIDE SERPL-SCNC: 101 MMOL/L (ref 98–111)
CO2 SERPL-SCNC: 29 MMOL/L (ref 22–29)
CREAT SERPL-MCNC: 0.8 MG/DL (ref 0.5–1)
EOSINOPHIL # BLD: 0.19 K/UL (ref 0.04–0.54)
EOSINOPHIL NFR BLD: 2.3 % (ref 0.7–7)
ERYTHROCYTE [DISTWIDTH] IN BLOOD BY AUTOMATED COUNT: 13 % (ref 11.7–14.4)
GLOBULIN: 2.9 G/DL
GLUCOSE SERPL-MCNC: 123 MG/DL (ref 74–106)
HCT VFR BLD AUTO: 42.6 % (ref 34.1–44.9)
HGB BLD-MCNC: 13.3 G/DL (ref 11.2–15.7)
LYMPHOCYTES # BLD: 1.81 K/UL (ref 1.18–3.74)
LYMPHOCYTES NFR BLD: 21.8 % (ref 19.3–53.1)
MCH RBC QN AUTO: 27.9 PG (ref 25.6–32.2)
MCHC RBC AUTO-ENTMCNC: 31.2 G/DL (ref 32.3–35.5)
MCV RBC AUTO: 89.5 FL (ref 79.4–94.8)
MONOCYTES # BLD: 0.54 K/UL (ref 0.24–0.82)
MONOCYTES NFR BLD: 6.5 % (ref 4.7–12.5)
NEUTROPHILS # BLD: 5.69 K/UL (ref 1.56–6.13)
NEUTS SEG NFR BLD: 68.4 % (ref 34–71.1)
PLATELET # BLD AUTO: 375 K/UL (ref 182–369)
PMV BLD AUTO: 9.2 FL (ref 7.4–10.4)
POTASSIUM SERPL-SCNC: 4.5 MMOL/L (ref 3.5–5.1)
PROT SERPL-MCNC: 6.8 G/DL (ref 6.3–8.2)
RBC # BLD AUTO: 4.76 M/UL (ref 3.93–5.22)
SODIUM SERPL-SCNC: 140 MMOL/L (ref 137–145)
WBC # BLD AUTO: 8.31 K/UL (ref 3.98–10.04)

## 2024-03-06 PROCEDURE — 80053 COMPREHEN METABOLIC PANEL: CPT

## 2024-03-06 PROCEDURE — 99213 OFFICE O/P EST LOW 20 MIN: CPT | Performed by: INTERNAL MEDICINE

## 2024-03-06 PROCEDURE — 85025 COMPLETE CBC W/AUTO DIFF WBC: CPT

## 2024-03-06 PROCEDURE — 1123F ACP DISCUSS/DSCN MKR DOCD: CPT | Performed by: INTERNAL MEDICINE

## 2024-03-06 PROCEDURE — 36415 COLL VENOUS BLD VENIPUNCTURE: CPT

## 2024-03-06 PROCEDURE — 99212 OFFICE O/P EST SF 10 MIN: CPT

## 2024-12-30 ENCOUNTER — OFFICE VISIT (OUTPATIENT)
Age: 69
End: 2024-12-30
Payer: MEDICARE

## 2024-12-30 VITALS — HEIGHT: 64 IN | BODY MASS INDEX: 42.68 KG/M2 | WEIGHT: 250 LBS

## 2024-12-30 DIAGNOSIS — R60.0 LOWER EXTREMITY EDEMA: Primary | ICD-10-CM

## 2024-12-30 DIAGNOSIS — M67.01 CONTRACTURE OF BOTH ACHILLES TENDONS: ICD-10-CM

## 2024-12-30 DIAGNOSIS — M72.2 PLANTAR FASCIITIS OF LEFT FOOT: ICD-10-CM

## 2024-12-30 DIAGNOSIS — M79.672 PAIN IN LEFT FOOT: ICD-10-CM

## 2024-12-30 DIAGNOSIS — M67.02 CONTRACTURE OF BOTH ACHILLES TENDONS: ICD-10-CM

## 2024-12-30 PROCEDURE — 1123F ACP DISCUSS/DSCN MKR DOCD: CPT | Performed by: PODIATRIST

## 2024-12-30 PROCEDURE — 1159F MED LIST DOCD IN RCRD: CPT | Performed by: PODIATRIST

## 2024-12-30 PROCEDURE — 20550 NJX 1 TENDON SHEATH/LIGAMENT: CPT | Performed by: PODIATRIST

## 2024-12-30 PROCEDURE — 99213 OFFICE O/P EST LOW 20 MIN: CPT | Performed by: PODIATRIST

## 2024-12-30 RX ORDER — MELOXICAM 15 MG/1
15 TABLET ORAL DAILY
Qty: 30 TABLET | Refills: 0 | Status: SHIPPED | OUTPATIENT
Start: 2024-12-30

## 2024-12-30 NOTE — PROGRESS NOTES
esophageal ulceration     Breast cancer (HCC) 2008    Dysplasia of cervix     Hyperlipidemia     Hypertension     Thyroid disease       Past Surgical History:   Procedure Laterality Date    BREAST RECONSTRUCTION  2010    BREAST RECONSTRUCTION  2011    BREAST RECONSTRUCTION  2011    BREAST SURGERY Right     implant changed    CHOLECYSTECTOMY  2006    COLONOSCOPY  12/2011    Pt will see Dr. Mart 2017    COLONOSCOPY N/A 11/09/2022    Dr VIRGINIE Beatty-Diverticular disease, 5 yr recall    GYNECOLOGIC CRYOSURGERY  2014    HYSTERECTOMY (CERVIX STATUS UNKNOWN)  1996    TV[facility] with retained ovaries    MASTECTOMY, BILATERAL  2008    OTHER SURGICAL HISTORY  2006    Sphincter ODODDI    [place] COLONOSCOPY FLX DX W/COLLJ SPEC WHEN PFRMD N/A 04/26/2017    Dr Mart-Diverticulosis, 5 yr recall    [place] EGD TRANSORAL BIOPSY SINGLE/MULTIPLE N/A 04/11/2018    Dr Mart (+) Reza's (-) dysplasia-Zuri (-)--1 yr recall    UPPER GASTROINTESTINAL ENDOSCOPY N/A 05/30/2019    Dr VIRGNIIE Beatty-Gastropathy, (+) Reza's, (-) dysplasia--1 yr recall    UPPER GASTROINTESTINAL ENDOSCOPY N/A 11/09/2022    Dr VIRGINIE Beatty (+) Reza's, gastritis, 3 yr recall      Social History     Socioeconomic History    Marital status:      Spouse name: None    Number of children: None    Years of education: None    Highest education level: None   Tobacco Use    Smoking status: Never    Smokeless tobacco: Never   Vaping Use    Vaping status: Never Used   Substance and Sexual Activity    Alcohol use: Yes     Comment: rarely    Drug use: No    Sexual activity: Defer     Social Determinants of Health      [facility]    Family and Community Support    [facility]    Safety and Environment    Received from [facility], [facility] Housing Stability Vital Sign      Social History     Occupational History    Not on file   Tobacco Use    Smoking status: Never    Smokeless

## 2025-01-15 ENCOUNTER — OFFICE VISIT (OUTPATIENT)
Dept: OBGYN CLINIC | Age: 70
End: 2025-01-15
Payer: MEDICARE

## 2025-01-15 VITALS
SYSTOLIC BLOOD PRESSURE: 161 MMHG | BODY MASS INDEX: 45.93 KG/M2 | HEIGHT: 64 IN | DIASTOLIC BLOOD PRESSURE: 76 MMHG | HEART RATE: 54 BPM | WEIGHT: 269 LBS

## 2025-01-15 DIAGNOSIS — Z78.0 POSTMENOPAUSAL: ICD-10-CM

## 2025-01-15 DIAGNOSIS — L29.2 VULVAR ITCHING: Primary | ICD-10-CM

## 2025-01-15 DIAGNOSIS — N94.10 DYSPAREUNIA IN FEMALE: ICD-10-CM

## 2025-01-15 DIAGNOSIS — N90.5 VULVAR ATROPHY: ICD-10-CM

## 2025-01-15 PROCEDURE — 99214 OFFICE O/P EST MOD 30 MIN: CPT | Performed by: NURSE PRACTITIONER

## 2025-01-15 PROCEDURE — 1123F ACP DISCUSS/DSCN MKR DOCD: CPT | Performed by: NURSE PRACTITIONER

## 2025-01-15 PROCEDURE — 1159F MED LIST DOCD IN RCRD: CPT | Performed by: NURSE PRACTITIONER

## 2025-01-15 PROCEDURE — 1160F RVW MEDS BY RX/DR IN RCRD: CPT | Performed by: NURSE PRACTITIONER

## 2025-01-15 ASSESSMENT — ENCOUNTER SYMPTOMS
ALLERGIC/IMMUNOLOGIC NEGATIVE: 1
GASTROINTESTINAL NEGATIVE: 1
RESPIRATORY NEGATIVE: 1
EYES NEGATIVE: 1

## 2025-01-15 NOTE — PROGRESS NOTES
Ashlyn Gonzalez is a 69 y.o. female who presents today for her medical conditions/ complaints as noted below. Ashlyn Gonzalez is c/o of vaginal dryness         HPI  Pt presents today with c/o vaginal dryness, barrett area tearing while wiping and during intercourse, and needing a bone density ordered. Pt states that she has been experiencing vaginal dryness for 2 years. Pt has been on hormones but does have hx of breast cancer. Pt had mastectomy in 2008. Pt is ok with using hormones thru [facility] at this time.   Has an active sexual life, weekly intercourse typically. Uses astroglide for lubricant.   Worst complaint is itching and discomfort with sex  Needs dexa, states overdue    No LMP recorded. Patient has had a hysterectomy.  No obstetric history on file.    Past Medical History:   Diagnosis Date    Acid reflux     Reza's esophageal ulceration     Breast cancer (HCC) 2008    Dysplasia of cervix     Hyperlipidemia     Hypertension     Thyroid disease      Past Surgical History:   Procedure Laterality Date    BREAST RECONSTRUCTION  2010    BREAST RECONSTRUCTION  2011    BREAST RECONSTRUCTION  2011    BREAST SURGERY Right     implant changed    CHOLECYSTECTOMY  2006    COLONOSCOPY  12/2011    Pt will see Dr. Mart 2017    COLONOSCOPY N/A 11/09/2022    Dr VIRGINIE Beatty-Diverticular disease, 5 yr recall    GYNECOLOGIC CRYOSURGERY  2014    HYSTERECTOMY (CERVIX STATUS UNKNOWN)  1996    TVH with retained ovaries    MASTECTOMY, BILATERAL  2008    OTHER SURGICAL HISTORY  2006    Sphincter ODODDI    [place] COLONOSCOPY FLX DX W/COLLJ SPEC WHEN PFRMD N/A 04/26/2017    Dr Mart-Diverticulosis, 5 yr recall    [place] EGD TRANSORAL BIOPSY SINGLE/MULTIPLE N/A 04/11/2018    Dr Mart (+) Reza's (-) dysplasia-Zuri (-)--1 yr recall    UPPER GASTROINTESTINAL ENDOSCOPY N/A 05/30/2019    Dr VIRGINIE Beatty-Gastropathy, (+) Reza's, (-) dysplasia--1 yr recall    UPPER GASTROINTESTINAL ENDOSCOPY N/A 11/09/2022    Dr VIRGINIE Beatty (+) Reza's, gastritis, 3 yr recall

## 2025-01-30 ENCOUNTER — OFFICE VISIT (OUTPATIENT)
Age: 70
End: 2025-01-30
Payer: MEDICARE

## 2025-01-30 VITALS — BODY MASS INDEX: 45.93 KG/M2 | WEIGHT: 269 LBS | HEIGHT: 64 IN

## 2025-01-30 DIAGNOSIS — M72.2 PLANTAR FASCIITIS OF LEFT FOOT: Primary | ICD-10-CM

## 2025-01-30 DIAGNOSIS — R60.0 LOWER EXTREMITY EDEMA: ICD-10-CM

## 2025-01-30 DIAGNOSIS — M79.672 PAIN IN LEFT FOOT: ICD-10-CM

## 2025-01-30 DIAGNOSIS — M67.02 CONTRACTURE OF BOTH ACHILLES TENDONS: ICD-10-CM

## 2025-01-30 DIAGNOSIS — M67.01 CONTRACTURE OF BOTH ACHILLES TENDONS: ICD-10-CM

## 2025-01-30 PROCEDURE — 1159F MED LIST DOCD IN RCRD: CPT | Performed by: PODIATRIST

## 2025-01-30 PROCEDURE — 1123F ACP DISCUSS/DSCN MKR DOCD: CPT | Performed by: PODIATRIST

## 2025-01-30 PROCEDURE — 99213 OFFICE O/P EST LOW 20 MIN: CPT | Performed by: PODIATRIST

## 2025-01-30 NOTE — PROGRESS NOTES
[facility]  [facility]  [address]  [facility] [id number] [phone]     Patient: Ashlyn Gonzalez   YOB: 1955   Date: 1/30/2025   Visit Type:  Follow up    Body Part:  left foot    When did the symptoms begin/Date of Onset or date of surgery?   Sign/ Symptoms Started June 2024   Last Seen in Clinic: 12/30/24    Pt states that since she came into the clinic she hasn't had any problems or pain out of the left foot. Pt is happy and pleased with the progress.     If over 55, have you aceves an Osteoporosis Screening in the last 2 years? No    History of Present Illness  Chief Complaint   Patient presents with    Follow Up Left Foot       This is a 69 y.o. female  presents today for follow-up of left foot pain.  She relates she is much improved.  She relates her swelling is gone down.  She wore compression stockings for about 10 days and the swelling went away.  She has not been taking the meloxicam over the last couple weeks.  She denies any complications or side effects from it.    Review of Systems  System  Neg/Pos  Details  Constitutional  Negative  Chills, Fatigue, Fever and Night Sweats  Respiratory  Negative  Chest Pain, Cough and Dyspnea  Cardio   Negative  Leg Swelling  GI   Negative  Abdominal Pain, Constipation, Nausea and Vomiting     Negative  Urinary Incontinence   Endocrine  Negative  Weight Gain and Weight Loss  MS   Negative  Except as noted in HPI and Chief Complaint    Past Medical History:   Diagnosis Date    Acid reflux     Reza's esophageal ulceration     Breast cancer (HCC) 2008    Dysplasia of cervix     Hyperlipidemia     Hypertension     Thyroid disease       Past Surgical History:   Procedure Laterality Date    BREAST RECONSTRUCTION  2010    BREAST RECONSTRUCTION  2011    BREAST RECONSTRUCTION  2011    BREAST SURGERY Right     implant changed    CHOLECYSTECTOMY  2006    COLONOSCOPY  12/2011    Pt

## 2025-03-03 ENCOUNTER — TELEPHONE (OUTPATIENT)
Dept: HEMATOLOGY | Age: 70
End: 2025-03-03

## 2025-03-03 NOTE — TELEPHONE ENCOUNTER

## 2025-03-05 NOTE — PROGRESS NOTES
history (History of Present Illness (HPI), Past Family Social History (PFSH), or Review of Systems (ROS) and made changes when appropriated.       (Please note that portions of this note were completed with a voice recognition program. Efforts were made to edit the dictations but occasionally words are mis-transcribed.)Electronically signed by Beto Franco MD on 3/6/2025 at 9:31 AM

## 2025-03-06 ENCOUNTER — OFFICE VISIT (OUTPATIENT)
Dept: HEMATOLOGY | Age: 70
End: 2025-03-06
Payer: MEDICARE

## 2025-03-06 ENCOUNTER — HOSPITAL ENCOUNTER (OUTPATIENT)
Dept: INFUSION THERAPY | Age: 70
Discharge: HOME | End: 2025-03-06
Payer: MEDICARE

## 2025-03-06 VITALS
BODY MASS INDEX: 46.38 KG/M2 | OXYGEN SATURATION: 96 % | HEIGHT: 64 IN | SYSTOLIC BLOOD PRESSURE: 132 MMHG | WEIGHT: 271.7 LBS | DIASTOLIC BLOOD PRESSURE: 88 MMHG | TEMPERATURE: 97.5 F | HEART RATE: 61 BPM

## 2025-03-06 DIAGNOSIS — Z08 ENCOUNTER FOR FOLLOW-UP SURVEILLANCE OF BREAST CANCER: ICD-10-CM

## 2025-03-06 DIAGNOSIS — Z71.89 CARE PLAN DISCUSSED WITH PATIENT: ICD-10-CM

## 2025-03-06 DIAGNOSIS — Z85.3 ENCOUNTER FOR FOLLOW-UP SURVEILLANCE OF BREAST CANCER: ICD-10-CM

## 2025-03-06 DIAGNOSIS — C50.912 INVASIVE DUCTAL CARCINOMA OF BREAST, LEFT (HCC): Primary | ICD-10-CM

## 2025-03-06 PROCEDURE — 1126F AMNT PAIN NOTED NONE PRSNT: CPT | Performed by: INTERNAL MEDICINE

## 2025-03-06 PROCEDURE — 99211 OFF/OP EST MAY X REQ PHY/QHP: CPT

## 2025-03-06 PROCEDURE — 1123F ACP DISCUSS/DSCN MKR DOCD: CPT | Performed by: INTERNAL MEDICINE

## 2025-03-06 PROCEDURE — 1159F MED LIST DOCD IN RCRD: CPT | Performed by: INTERNAL MEDICINE

## 2025-03-06 PROCEDURE — 99212 OFFICE O/P EST SF 10 MIN: CPT | Performed by: INTERNAL MEDICINE

## 2025-04-25 NOTE — PROGRESS NOTES
Subjective    Ms. Morataya is 69 y.o. female    Chief Complaint: Kidney stone    History of Present Illness  69 year old new patient referred by Shivam Chew for a left ureteral stone and left hydronephrosis. Patient state that she started having pain on her left flank and lower left abdominal pain that started the weekend before 3/12/25 and went to see her pcp who performed at CT abd/[facility] that I reviewed today with patient that showed a 4 mm left proximal ureteral stone, left non-obstructing renal stones, and simple renal cyst. She was started on Tamsulosin at that time. She had an ultrasound completed on 4/3 reviewed by me today that showed mild left sided hydronephrosis. She states she believes she passed the stone as she does not have any more pain and a few weeks ago she felt a sharp pain in her urethra while urinating. She had a KUB completed today and reviewed by me that does not show any ureteral stones but a possible left non-obstructing renal stone. She states she has a history of stones and passed one around 5 years ago, she also received ESWL for an additional stone at that time. She denies gross hematuria, current flank pain, nausea or vomiting, or fever. Urine today is clear.     I independently visualized and reviewed the patient's prior imaging studies today in clinic and discussed the imaging findings with the patient.    The following portions of the patient's history were reviewed and updated as appropriate: allergies, current medications, past family history, past medical history, past social history, past surgical history and problem list.    Review of Systems      Current Outpatient Medications:     ALPRAZolam (XANAX) 0.25 MG tablet, Take 1 tablet by mouth 3 (Three) Times a Day As Needed for Anxiety., Disp: , Rfl: 0    bisoprolol (ZEBeta) 10 MG tablet, , Disp: , Rfl:     cefuroxime (CEFTIN) 250 MG tablet, Take 1 tablet by mouth Every 12 (Twelve) Hours., Disp: , Rfl:      escitalopram (LEXAPRO) 10 MG tablet, Daily As Needed (for anxiety)., Disp: , Rfl:     Ezetimibe (ZETIA PO), Take 10 mg by mouth Daily., Disp: , Rfl:     ferrous sulfate 325 (65 FE) MG tablet, Take 1 tablet by mouth Daily., Disp: , Rfl:     levothyroxine (SYNTHROID, LEVOTHROID) 50 MCG tablet, Take 1 tablet by mouth Daily., Disp: , Rfl:     omeprazole (priLOSEC) 20 MG capsule, Take 1 capsule by mouth Daily., Disp: , Rfl:     polycarbophil 625 MG tablet tablet, Take 2 tablets by mouth., Disp: , Rfl:     Vitamin D, Cholecalciferol, (CHOLECALCIFEROL) 400 UNITS tablet, Take 1 tablet by mouth Daily., Disp: , Rfl:     bisoprolol (ZEBeta) 5 MG tablet, Take 5 mg by mouth 2 (Two) Times a Day. (Patient not taking: Reported on 4/30/2025), Disp: , Rfl:     Calcium-Magnesium-Vitamin D (CALCIUM 500 PO), Take 500 mg by mouth Daily. (Patient not taking: Reported on 4/30/2025), Disp: , Rfl:     hydroCHLOROthiazide (HYDRODIURIL) 12.5 MG tablet, , Disp: , Rfl:     HYDROcodone-acetaminophen (NORCO) 5-325 MG per tablet, Take 1 tablet by mouth Every 4 (Four) Hours As Needed for Moderate Pain  or Severe Pain  (Pain) for up to 8 doses. (Patient not taking: Reported on 4/30/2025), Disp: 6 tablet, Rfl: 0    ondansetron ODT (ZOFRAN-ODT) 8 MG disintegrating tablet, Take 4 mg by mouth. (Patient not taking: Reported on 4/30/2025), Disp: , Rfl:     tamsulosin (FLOMAX) 0.4 MG capsule 24 hr capsule, Take 1 capsule by mouth every night at bedtime. (Patient not taking: Reported on 4/30/2025), Disp: , Rfl:     Past Medical History:   Diagnosis Date    Acid reflux     Anxiety     Breast cancer     Disease of thyroid gland     High cholesterol     History of chemotherapy     Hypertension     Kidney stone     Lymphedema of left arm     Panic attacks     PONV (postoperative nausea and vomiting)        Past Surgical History:   Procedure Laterality Date    BONE CYST EXCISION Left 1981    CYST REMOVED FROM LEFT RING FINGER, GRAFT TAKEN FROM RIGHT HIP    BREAST  "RECONSTRUCTION Bilateral 2010    CHOLECYSTECTOMY  2005    EXTRACORPOREAL SHOCK WAVE LITHOTRIPSY (ESWL) Left 7/31/2017    Procedure: EXTRACORPOREAL SHOCKWAVE LITHOTRIPSY;  Surgeon: Hollis Garcia MD;  Location:  [facility];  Service:     FLAP HEAD/NECK N/A 11/6/2019    Procedure: POSSIBLE FLAP;  Surgeon: Lamine Aguiar MD;  Location:  [facility];  Service: ENT    HEAD/NECK LESION/CYST EXCISION Left 11/6/2019    Procedure: 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 with transposition flap for closure;  Surgeon: Lamine Aguiar MD;  Location:  [facility];  Service: ENT    HYSTERECTOMY  2001    MASTECTOMY Bilateral 2008    OTHER SURGICAL HISTORY  2005    SPHINCTER OF ODDI REPAIR        Social History     Socioeconomic History    Marital status:    Tobacco Use    Smoking status: Never     Passive exposure: Never    Smokeless tobacco: Never   Vaping Use    Vaping status: Never Used   Substance and Sexual Activity    Alcohol use: Yes     Comment: occ    Drug use: No    Sexual activity: Defer       Family History   Problem Relation Age of Onset    Cancer Father     Kidney cancer Mother        Objective    Temp 96.1 °F (35.6 °C) (Infrared)   Ht 162.6 cm (64\")   Wt 124 kg (273 lb 6.4 oz)   BMI 46.93 kg/m²     Physical Exam    Constitutional:No apparent distress; vitals reviewed as above  Psychiatric: Appropriate affect; alert and oriented  Musculoskeletal: Normal gait and station  Respiratory: No distress; unlabored movement; no accessory musculature needed with symmetric movements  Skin: No pallor or diaphoresis      Results for orders placed or performed in visit on 04/30/25   POC Urinalysis Dipstick, Multipro    Collection Time: 04/30/25  8:44 AM    Specimen: Urine   Result Value Ref Range    Color Yellow Yellow, Straw, Dark Yellow, Noris    Clarity, UA Clear Clear    Glucose, UA Negative Negative mg/dL    Bilirubin Negative Negative    Ketones, UA Negative Negative    Specific Gravity "  1.020 1.005 - 1.030    Blood, UA Negative Negative    pH, Urine 5.5 5.0 - 8.0    Protein, POC Negative Negative mg/dL    Urobilinogen, UA 0.2 E.U./dL Normal, 0.2 E.U./dL    Nitrite, UA Negative Negative    Leukocytes Small (1+) (A) Negative     Assessment and Plan    Diagnoses and all orders for this visit:    1. Kidney stone (Primary)  -     POC Urinalysis Dipstick, Multipro  -     US Renal Bilateral; Future    2. Hydronephrosis with urinary obstruction due to ureteral calculus    Patient currently asymptomatic from previous ureteral stone seen on CT. Last ultrasound showed mild hydronephrosis. Will repeat ultrasound in 3 months to evaluate hydronephrosis at that time. She may continue Flomax as needed. Discussed non-obstructing stone and if patient wanted to treat in the future we could discuss ESWL at her next appointment. We discussed recommendations for reducing future risk of stone formation and/or stone enlargement including increasing fluid intake with a goal of 6 L daily to achieve a urinary output of 2 to 2.5 L daily, low oxalate diet, benefits of dietary citrate, reducing purine intake, and no added salt.If patient has return of pain to call the office to follow up, otherwise she will return in 3 months with a repeat ultrasound.   Patient reports that she is not currently experiencing any symptoms of urinary incontinence.

## 2025-04-30 ENCOUNTER — HOSPITAL ENCOUNTER (OUTPATIENT)
Dept: GENERAL RADIOLOGY | Facility: HOSPITAL | Age: 70
Discharge: HOME | End: 2025-04-30
Payer: MEDICARE

## 2025-04-30 ENCOUNTER — OFFICE VISIT (OUTPATIENT)
Dept: UROLOGY | Facility: CLINIC | Age: 70
End: 2025-04-30
Payer: MEDICARE

## 2025-04-30 VITALS — TEMPERATURE: 96.1 F | WEIGHT: 273.4 LBS | BODY MASS INDEX: 46.67 KG/M2 | HEIGHT: 64 IN

## 2025-04-30 DIAGNOSIS — N20.0 KIDNEY STONES: ICD-10-CM

## 2025-04-30 DIAGNOSIS — N20.0 KIDNEY STONES: Primary | ICD-10-CM

## 2025-04-30 DIAGNOSIS — N20.0 KIDNEY STONE: Primary | ICD-10-CM

## 2025-04-30 DIAGNOSIS — N13.2 HYDRONEPHROSIS WITH URINARY OBSTRUCTION DUE TO URETERAL CALCULUS: ICD-10-CM

## 2025-04-30 LAB
BILIRUB BLD-MCNC: NEGATIVE MG/DL
CLARITY, POC: CLEAR
COLOR UR: YELLOW
GLUCOSE UR STRIP-MCNC: NEGATIVE MG/DL
KETONES UR QL: NEGATIVE
LEUKOCYTE EST, POC: ABNORMAL
NITRITE UR-MCNC: NEGATIVE MG/ML
PH UR: 5.5 [PH] (ref 5–8)
PROT UR STRIP-MCNC: NEGATIVE MG/DL
RBC # UR STRIP: NEGATIVE /UL
SP GR UR: 1.02 (ref 1–1.03)
UROBILINOGEN UR QL: ABNORMAL

## 2025-04-30 PROCEDURE — 74018 RADEX ABDOMEN 1 VIEW: CPT

## 2025-04-30 RX ORDER — TAMSULOSIN HYDROCHLORIDE 0.4 MG/1
1 CAPSULE ORAL
COMMUNITY
Start: 2025-03-21

## 2025-04-30 RX ORDER — CEFUROXIME AXETIL 250 MG/1
1 TABLET ORAL EVERY 12 HOURS SCHEDULED
COMMUNITY
Start: 2025-04-09

## 2025-05-01 ENCOUNTER — OFFICE VISIT (OUTPATIENT)
Age: 70
End: 2025-05-01

## 2025-05-01 DIAGNOSIS — M79.672 LEFT FOOT PAIN: ICD-10-CM

## 2025-05-01 DIAGNOSIS — M79.672 PAIN IN LEFT FOOT: ICD-10-CM

## 2025-05-01 DIAGNOSIS — M76.62 CALCIFIC ACHILLES TENDINITIS OF LEFT LOWER EXTREMITY: ICD-10-CM

## 2025-05-01 DIAGNOSIS — M72.2 PLANTAR FASCIITIS OF LEFT FOOT: ICD-10-CM

## 2025-05-01 DIAGNOSIS — I87.2 VENOUS STASIS DERMATITIS OF BOTH LOWER EXTREMITIES: Primary | ICD-10-CM

## 2025-05-01 NOTE — PROGRESS NOTES
today in the left Foot infracalcaneal heel area. This was delivered from a medial approach to the left infracalcaneal heel area at the plantar fascia insertion with a 25 gauge 1-1/2 needle. It consisted of 1 cc 0.5 percent Bupivacaine Plain, 4 milligrams of Dexamethasone, 20 millagrams of Kenalog. She tolerated this procedure well without complications.  Written and verbal postoperative care instructions were given.      Return if symptoms worsen or fail to improve.      Patient given educational materials - see patient instructions.   Discussed use, benefit, and side effects of prescribed medications.  All patient questions answered.  Pt voiced understanding. Patient agreed with treatment plan. Follow up as needed.    This dictation was generated by voice recognition computer software. Although all attempts are made to edit the dictation for accuracy, there may be errors in the transcription that are not intended.    Electronically signed by Tyron Bob II, DPM on 5/1/2025 at 4:20 PM

## 2025-05-01 NOTE — PATIENT INSTRUCTIONS
your toes pointing forward.  Keeping your back leg straight and your back heel on the floor, bend your front knee and gently bring your hip and chest toward the wall until you feel a stretch in the calf of your back leg.  Hold the stretch for 15 to 30 seconds.  Repeat 2 to 4 times for each leg.  Calf stretch on a step    Stand on the bottom step of a staircase, facing up toward the stairs. Put the balls of your feet on the step. Hold on to the handrail or wall.  Slowly let your heels down over the edge of the step as you relax your calf muscles. You should feel a gentle stretch up the back of your leg to your knee.  Hold the stretch about 15 to 30 seconds, and then tighten your calf muscle a little to bring your heel back up to the level of the step.  Repeat 2 to 4 times.  Towel scrunch    Sit in a chair, and place your affected foot on a towel on a hard floor (not a floor with carpet).  Scrunch the towel toward you with your toes. Then use your toes to push the towel back into place.  Repeat 8 to 12 times.  It's a good idea to repeat these steps with your other foot.  Make this exercise more challenging by placing a weighted object, such as a soup can, on the other end of the towel.  [place] pickups    Put some marbles, dice, or small smooth rocks on the floor next to a cup.  Sit in a chair, and use the toes of your affected foot to lift up one item from the floor. Then try to put the item in the cup.  Repeat 8 to 12 times.  It's a good idea to repeat these steps with your other foot.  Follow-up care is a key part of your treatment and safety. Be sure to make and go to all appointments, and call your doctor if you are having problems. It's also a good idea to know your test results and keep a list of the medicines you take.  Current as of: July 31, 2024  Content Version: 14.4  © 0409-2601 [employer].   Care instructions adapted under license by [employer]. If you have questions about a medical

## 2025-05-02 RX ORDER — DEXAMETHASONE SODIUM PHOSPHATE 4 MG/ML
4 INJECTION, SOLUTION INTRA-ARTICULAR; INTRALESIONAL; INTRAMUSCULAR; INTRAVENOUS; SOFT TISSUE ONCE
Status: COMPLETED | OUTPATIENT
Start: 2025-05-02 | End: 2025-05-02

## 2025-05-02 RX ORDER — BUPIVACAINE HYDROCHLORIDE 5 MG/ML
1 INJECTION, SOLUTION EPIDURAL; INTRACAUDAL; PERINEURAL ONCE
Status: COMPLETED | OUTPATIENT
Start: 2025-05-02 | End: 2025-05-02

## 2025-05-02 RX ORDER — TRIAMCINOLONE ACETONIDE 40 MG/ML
20 INJECTION, SUSPENSION INTRA-ARTICULAR; INTRAMUSCULAR ONCE
Status: COMPLETED | OUTPATIENT
Start: 2025-05-02 | End: 2025-05-02

## 2025-05-02 RX ADMIN — DEXAMETHASONE SODIUM PHOSPHATE 4 MG: 4 INJECTION, SOLUTION INTRA-ARTICULAR; INTRALESIONAL; INTRAMUSCULAR; INTRAVENOUS; SOFT TISSUE at 14:15

## 2025-05-02 RX ADMIN — BUPIVACAINE HYDROCHLORIDE 5 MG: 5 INJECTION, SOLUTION EPIDURAL; INTRACAUDAL; PERINEURAL at 14:15

## 2025-05-02 RX ADMIN — TRIAMCINOLONE ACETONIDE 20 MG: 40 INJECTION, SUSPENSION INTRA-ARTICULAR; INTRAMUSCULAR at 14:17

## 2025-05-07 ENCOUNTER — PATIENT MESSAGE (OUTPATIENT)
Dept: HEMATOLOGY | Age: 70
End: 2025-05-07

## 2025-05-08 ENCOUNTER — CLINICAL DOCUMENTATION (OUTPATIENT)
Dept: HEMATOLOGY | Age: 70
End: 2025-05-08

## 2025-05-08 DIAGNOSIS — Z98.890 HISTORY OF LYMPH NODE DISSECTION OF AXILLA: ICD-10-CM

## 2025-05-08 DIAGNOSIS — I89.0 LYMPHEDEMA OF LEFT ARM: ICD-10-CM

## 2025-05-08 DIAGNOSIS — C50.912 INVASIVE DUCTAL CARCINOMA OF BREAST, LEFT (HCC): Primary | ICD-10-CM

## 2025-05-08 NOTE — PROGRESS NOTES
Pt sent My Chart message asking for referral to [facility] due to her having left arm swelling. Discussed with Dr Franco who agrees for the referral to be placed. Referral placed today. My Chart message response sent to pt to inform her the referral will be sent to [facility].

## 2025-05-13 ENCOUNTER — TRANSCRIBE ORDERS (OUTPATIENT)
Dept: PHYSICAL THERAPY | Facility: HOSPITAL | Age: 70
End: 2025-05-13
Payer: MEDICARE

## 2025-05-13 DIAGNOSIS — Z98.890 HISTORY OF LYMPH NODE DISSECTION OF AXILLA: ICD-10-CM

## 2025-05-13 DIAGNOSIS — C50.912 INVASIVE DUCTAL CARCINOMA OF BREAST, LEFT: Primary | ICD-10-CM

## 2025-05-13 DIAGNOSIS — I89.0 LYMPHEDEMA OF LEFT ARM: ICD-10-CM

## 2025-05-27 ENCOUNTER — APPOINTMENT (OUTPATIENT)
Dept: CT IMAGING | Facility: HOSPITAL | Age: 70
End: 2025-05-27
Payer: MEDICARE

## 2025-05-27 ENCOUNTER — HOSPITAL ENCOUNTER (EMERGENCY)
Facility: HOSPITAL | Age: 70
Discharge: HOME | End: 2025-05-28
Attending: EMERGENCY MEDICINE
Payer: MEDICARE

## 2025-05-27 DIAGNOSIS — N20.1 LEFT URETERAL STONE: Primary | ICD-10-CM

## 2025-05-27 LAB
ANION GAP SERPL CALCULATED.3IONS-SCNC: 12 MMOL/L (ref 5–15)
BACTERIA UR QL AUTO: ABNORMAL /HPF
BASOPHILS # BLD AUTO: 0.06 10*3/MM3 (ref 0–0.2)
BASOPHILS NFR BLD AUTO: 0.6 % (ref 0–1.5)
BILIRUB UR QL STRIP: NEGATIVE
BUN SERPL-MCNC: 20.2 MG/DL (ref 8–23)
BUN/CREAT SERPL: 20.4 (ref 7–25)
CALCIUM SPEC-SCNC: 9.2 MG/DL (ref 8.6–10.5)
CHLORIDE SERPL-SCNC: 100 MMOL/L (ref 98–107)
CLARITY UR: ABNORMAL
CO2 SERPL-SCNC: 26 MMOL/L (ref 22–29)
COLOR UR: YELLOW
CREAT SERPL-MCNC: 0.99 MG/DL (ref 0.57–1)
DEPRECATED RDW RBC AUTO: 43.3 FL (ref 37–54)
EGFRCR SERPLBLD CKD-EPI 2021: 62.6 ML/MIN/1.73
EOSINOPHIL # BLD AUTO: 0.14 10*3/MM3 (ref 0–0.4)
EOSINOPHIL NFR BLD AUTO: 1.3 % (ref 0.3–6.2)
ERYTHROCYTE [DISTWIDTH] IN BLOOD BY AUTOMATED COUNT: 13.6 % (ref 12.3–15.4)
GLUCOSE SERPL-MCNC: 146 MG/DL (ref 65–99)
GLUCOSE UR STRIP-MCNC: NEGATIVE MG/DL
HCT VFR BLD AUTO: 42.3 % (ref 34–46.6)
HGB BLD-MCNC: 13.5 G/DL (ref 12–15.9)
HGB UR QL STRIP.AUTO: ABNORMAL
HOLD SPECIMEN: NORMAL
HYALINE CASTS UR QL AUTO: ABNORMAL /LPF
IMM GRANULOCYTES # BLD AUTO: 0.04 10*3/MM3 (ref 0–0.05)
IMM GRANULOCYTES NFR BLD AUTO: 0.4 % (ref 0–0.5)
KETONES UR QL STRIP: NEGATIVE
LEUKOCYTE ESTERASE UR QL STRIP.AUTO: NEGATIVE
LYMPHOCYTES # BLD AUTO: 2.43 10*3/MM3 (ref 0.7–3.1)
LYMPHOCYTES NFR BLD AUTO: 23.1 % (ref 19.6–45.3)
MCH RBC QN AUTO: 27.8 PG (ref 26.6–33)
MCHC RBC AUTO-ENTMCNC: 31.9 G/DL (ref 31.5–35.7)
MCV RBC AUTO: 87 FL (ref 79–97)
MONOCYTES # BLD AUTO: 0.84 10*3/MM3 (ref 0.1–0.9)
MONOCYTES NFR BLD AUTO: 8 % (ref 5–12)
NEUTROPHILS NFR BLD AUTO: 66.6 % (ref 42.7–76)
NEUTROPHILS NFR BLD AUTO: 7.01 10*3/MM3 (ref 1.7–7)
NITRITE UR QL STRIP: NEGATIVE
NRBC BLD AUTO-RTO: 0 /100 WBC (ref 0–0.2)
PH UR STRIP.AUTO: <=5 [PH] (ref 5–8)
PLATELET # BLD AUTO: 353 10*3/MM3 (ref 140–450)
PMV BLD AUTO: 9.4 FL (ref 6–12)
POTASSIUM SERPL-SCNC: 4.3 MMOL/L (ref 3.5–5.2)
PROT UR QL STRIP: ABNORMAL
RBC # BLD AUTO: 4.86 10*6/MM3 (ref 3.77–5.28)
RBC # UR STRIP: ABNORMAL /HPF
REF LAB TEST METHOD: ABNORMAL
SODIUM SERPL-SCNC: 138 MMOL/L (ref 136–145)
SP GR UR STRIP: >=1.03 (ref 1–1.03)
SQUAMOUS #/AREA URNS HPF: ABNORMAL /HPF
UROBILINOGEN UR QL STRIP: ABNORMAL
WBC # UR STRIP: ABNORMAL /HPF
WBC NRBC COR # BLD AUTO: 10.52 10*3/MM3 (ref 3.4–10.8)
WHOLE BLOOD HOLD COAG: NORMAL
WHOLE BLOOD HOLD SPECIMEN: NORMAL

## 2025-05-27 PROCEDURE — 25010000002 KETOROLAC TROMETHAMINE PER 15 MG: Performed by: EMERGENCY MEDICINE

## 2025-05-27 PROCEDURE — 80048 BASIC METABOLIC PNL TOTAL CA: CPT | Performed by: EMERGENCY MEDICINE

## 2025-05-27 PROCEDURE — 99284 EMERGENCY DEPT VISIT MOD MDM: CPT | Performed by: EMERGENCY MEDICINE

## 2025-05-27 PROCEDURE — 74176 CT ABD & PELVIS W/O CONTRAST: CPT

## 2025-05-27 PROCEDURE — 81001 URINALYSIS AUTO W/SCOPE: CPT

## 2025-05-27 PROCEDURE — 96374 THER/PROPH/DIAG INJ IV PUSH: CPT

## 2025-05-27 PROCEDURE — 85025 COMPLETE CBC W/AUTO DIFF WBC: CPT | Performed by: EMERGENCY MEDICINE

## 2025-05-27 PROCEDURE — 87086 URINE CULTURE/COLONY COUNT: CPT

## 2025-05-27 RX ORDER — KETOROLAC TROMETHAMINE 15 MG/ML
15 INJECTION, SOLUTION INTRAMUSCULAR; INTRAVENOUS ONCE
Status: COMPLETED | OUTPATIENT
Start: 2025-05-27 | End: 2025-05-27

## 2025-05-27 RX ADMIN — KETOROLAC TROMETHAMINE 15 MG: 15 INJECTION, SOLUTION INTRAMUSCULAR; INTRAVENOUS at 22:49

## 2025-05-28 ENCOUNTER — OFFICE VISIT (OUTPATIENT)
Dept: UROLOGY | Facility: CLINIC | Age: 70
End: 2025-05-28
Payer: MEDICARE

## 2025-05-28 VITALS
WEIGHT: 270 LBS | RESPIRATION RATE: 20 BRPM | TEMPERATURE: 98.2 F | BODY MASS INDEX: 46.1 KG/M2 | SYSTOLIC BLOOD PRESSURE: 149 MMHG | HEART RATE: 59 BPM | HEIGHT: 64 IN | OXYGEN SATURATION: 97 % | DIASTOLIC BLOOD PRESSURE: 70 MMHG

## 2025-05-28 VITALS — BODY MASS INDEX: 45.75 KG/M2 | HEIGHT: 64 IN | TEMPERATURE: 98.6 F | WEIGHT: 268 LBS

## 2025-05-28 DIAGNOSIS — R39.89 SENSATION OF PRESSURE IN BLADDER AREA: Primary | ICD-10-CM

## 2025-05-28 DIAGNOSIS — R30.0 DYSURIA: ICD-10-CM

## 2025-05-28 LAB
BACTERIA SPEC AEROBE CULT: NORMAL
BILIRUB BLD-MCNC: NEGATIVE MG/DL
CLARITY, POC: CLEAR
COLOR UR: YELLOW
GLUCOSE UR STRIP-MCNC: NEGATIVE MG/DL
KETONES UR QL: NEGATIVE
LEUKOCYTE EST, POC: NEGATIVE
NITRITE UR-MCNC: NEGATIVE MG/ML
PH UR: 5.5 [PH] (ref 5–8)
PROT UR STRIP-MCNC: NEGATIVE MG/DL
RBC # UR STRIP: NEGATIVE /UL
SP GR UR: 1.02 (ref 1–1.03)
UROBILINOGEN UR QL: NORMAL

## 2025-05-28 RX ORDER — PHENAZOPYRIDINE HYDROCHLORIDE 100 MG/1
100 TABLET, FILM COATED ORAL 3 TIMES DAILY PRN
Qty: 20 TABLET | Refills: 0 | Status: SHIPPED | OUTPATIENT
Start: 2025-05-28

## 2025-05-28 RX ORDER — CEFDINIR 300 MG/1
300 CAPSULE ORAL 2 TIMES DAILY
Qty: 10 CAPSULE | Refills: 0 | Status: SHIPPED | OUTPATIENT
Start: 2025-05-28 | End: 2025-06-02

## 2025-05-28 RX ORDER — TAMSULOSIN HYDROCHLORIDE 0.4 MG/1
1 CAPSULE ORAL DAILY
Qty: 5 CAPSULE | Refills: 0 | Status: SHIPPED | OUTPATIENT
Start: 2025-05-28 | End: 2025-06-02

## 2025-05-28 RX ORDER — TRAMADOL HYDROCHLORIDE 50 MG/1
50 TABLET ORAL EVERY 6 HOURS PRN
Qty: 10 TABLET | Refills: 0 | Status: SHIPPED | OUTPATIENT
Start: 2025-05-28

## 2025-05-28 RX ORDER — ONDANSETRON 4 MG/1
4 TABLET, ORALLY DISINTEGRATING ORAL EVERY 8 HOURS PRN
Qty: 10 TABLET | Refills: 0 | Status: SHIPPED | OUTPATIENT
Start: 2025-05-28

## 2025-05-28 RX ORDER — CEFDINIR 300 MG/1
300 CAPSULE ORAL ONCE
Status: COMPLETED | OUTPATIENT
Start: 2025-05-28 | End: 2025-05-28

## 2025-05-28 RX ADMIN — CEFDINIR 300 MG: 300 CAPSULE ORAL at 00:33

## 2025-05-28 NOTE — PROGRESS NOTES
Subjective    Ms. Morataya is 69 y.o. female    Chief Complaint: Pressure in Bladder Area    History of Present Illness    69 year old established patient with history of kidney stones.  She is here today with new problem of possibly passing another kidney stones. She stated yesterday she called the office due to urinary retention and dysuria, stating she was having bladder spasms. Last night she felt sharp pain in her left abdomen/flank area and went to the ED. She had a CT completed and reviewed by me today with patient that showed a 3 mm distal left ureteral stone and a non-obstructing left renal stone. She states that she used the bathroom prior to leaving the ED and passed the stone. She was given cefdinir at discharge for a possible UTI. Urine culture is still in process. She recently possibly passed a 4 mm left ureteral stone seen on CT. She states she has a history of stones and passed one around 5 years ago, she also received ESWL for an additional stone at that time. She denies gross hematuria, current flank pain, nausea or vomiting, or fever. She reports still with bladder spasms and dysuria. Urine today is clear.      I independently visualized and reviewed the patient's prior imaging studies today in clinic and discussed the imaging findings with the patient.    The following portions of the patient's history were reviewed and updated as appropriate: allergies, current medications, past family history, past medical history, past social history, past surgical history and problem list.    Review of Systems      Current Outpatient Medications:     ALPRAZolam (XANAX) 0.25 MG tablet, Take 1 tablet by mouth 3 (Three) Times a Day As Needed for Anxiety., Disp: , Rfl: 0    bisoprolol (ZEBeta) 10 MG tablet, , Disp: , Rfl:     bisoprolol (ZEBeta) 5 MG tablet, Take 5 mg by mouth 2 (Two) Times a Day. (Patient not taking: Reported on 4/30/2025), Disp: , Rfl:     Calcium-Magnesium-Vitamin D (CALCIUM 500 PO), Take 500 mg  by mouth Daily. (Patient not taking: Reported on 4/30/2025), Disp: , Rfl:     cefdinir (OMNICEF) 300 MG capsule, Take 1 capsule by mouth 2 (Two) Times a Day for 5 days., Disp: 10 capsule, Rfl: 0    cefuroxime (CEFTIN) 250 MG tablet, Take 1 tablet by mouth Every 12 (Twelve) Hours., Disp: , Rfl:     escitalopram (LEXAPRO) 10 MG tablet, Daily As Needed (for anxiety)., Disp: , Rfl:     Ezetimibe (ZETIA PO), Take 10 mg by mouth Daily., Disp: , Rfl:     ferrous sulfate 325 (65 FE) MG tablet, Take 1 tablet by mouth Daily., Disp: , Rfl:     hydroCHLOROthiazide (HYDRODIURIL) 12.5 MG tablet, , Disp: , Rfl:     HYDROcodone-acetaminophen (NORCO) 5-325 MG per tablet, Take 1 tablet by mouth Every 4 (Four) Hours As Needed for Moderate Pain  or Severe Pain  (Pain) for up to 8 doses. (Patient not taking: Reported on 4/30/2025), Disp: 6 tablet, Rfl: 0    levothyroxine (SYNTHROID, LEVOTHROID) 50 MCG tablet, Take 1 tablet by mouth Daily., Disp: , Rfl:     omeprazole (priLOSEC) 20 MG capsule, Take 1 capsule by mouth Daily., Disp: , Rfl:     ondansetron ODT (ZOFRAN-ODT) 4 MG disintegrating tablet, Place 1 tablet on the tongue Every 8 (Eight) Hours As Needed for Nausea or Vomiting., Disp: 10 tablet, Rfl: 0    ondansetron ODT (ZOFRAN-ODT) 8 MG disintegrating tablet, Take 4 mg by mouth. (Patient not taking: Reported on 4/30/2025), Disp: , Rfl:     polycarbophil 625 MG tablet tablet, Take 2 tablets by mouth., Disp: , Rfl:     tamsulosin (FLOMAX) 0.4 MG capsule 24 hr capsule, Take 1 capsule by mouth every night at bedtime. (Patient not taking: Reported on 4/30/2025), Disp: , Rfl:     tamsulosin (FLOMAX) 0.4 MG capsule 24 hr capsule, Take 1 capsule by mouth Daily for 5 days., Disp: 5 capsule, Rfl: 0    traMADol (ULTRAM) 50 MG tablet, Take 1 tablet by mouth Every 6 (Six) Hours As Needed for Moderate Pain., Disp: 10 tablet, Rfl: 0    Vitamin D, Cholecalciferol, (CHOLECALCIFEROL) 400 UNITS tablet, Take 1 tablet by mouth Daily., Disp: , Rfl:   No  current facility-administered medications for this visit.    Past Medical History:   Diagnosis Date    Acid reflux     Anxiety     Breast cancer     Disease of thyroid gland     High cholesterol     History of chemotherapy     Hypertension     Kidney stone     Lymphedema of left arm     Panic attacks     PONV (postoperative nausea and vomiting)        Past Surgical History:   Procedure Laterality Date    BONE CYST EXCISION Left 1981    CYST REMOVED FROM LEFT RING FINGER, GRAFT TAKEN FROM RIGHT HIP    BREAST RECONSTRUCTION Bilateral 2010    CHOLECYSTECTOMY  2005    EXTRACORPOREAL SHOCK WAVE LITHOTRIPSY (ESWL) Left 7/31/2017    Procedure: EXTRACORPOREAL SHOCKWAVE LITHOTRIPSY;  Surgeon: Hollis Garcia MD;  Location:  [facility];  Service:     FLAP HEAD/NECK N/A 11/6/2019    Procedure: POSSIBLE FLAP;  Surgeon: Lamine Aguiar MD;  Location:  [facility];  Service: ENT    HEAD/NECK LESION/CYST EXCISION Left 11/6/2019    Procedure: 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 with transposition flap for closure;  Surgeon: Lamine Aguiar MD;  Location:  [facility];  Service: ENT    HYSTERECTOMY  2001    MASTECTOMY Bilateral 2008    OTHER SURGICAL HISTORY  2005    SPHINCTER OF ODDI REPAIR        Social History     Socioeconomic History    Marital status:    Tobacco Use    Smoking status: Never     Passive exposure: Never    Smokeless tobacco: Never   Vaping Use    Vaping status: Never Used   Substance and Sexual Activity    Alcohol use: Yes     Comment: occ    Drug use: No    Sexual activity: Defer       Family History   Problem Relation Age of Onset    Cancer Father     Kidney cancer Mother        Objective    There were no vitals taken for this visit.    Physical Exam    Constitutional:No apparent distress; vitals reviewed as above  Psychiatric: Appropriate affect; alert and oriented  Musculoskeletal: Normal gait and station  Respiratory: No distress; unlabored movement; no accessory  musculature needed with symmetric movements  Skin: No pallor or diaphoresis      Results for orders placed or performed during the hospital encounter of 05/27/25   Basic Metabolic Panel    Collection Time: 05/27/25  9:17 PM    Specimen: Blood   Result Value Ref Range    Glucose 146 (H) 65 - 99 mg/dL    BUN 20.2 8.0 - 23.0 mg/dL    Creatinine 0.99 0.57 - 1.00 mg/dL    Sodium 138 136 - 145 mmol/L    Potassium 4.3 3.5 - 5.2 mmol/L    Chloride 100 98 - 107 mmol/L    CO2 26.0 22.0 - 29.0 mmol/L    Calcium 9.2 8.6 - 10.5 mg/dL    BUN/Creatinine Ratio 20.4 7.0 - 25.0    Anion Gap 12.0 5.0 - 15.0 mmol/L    eGFR 62.6 >60.0 mL/min/1.73   CBC Auto Differential    Collection Time: 05/27/25  9:17 PM    Specimen: Blood   Result Value Ref Range    WBC 10.52 3.40 - 10.80 10*3/mm3    RBC 4.86 3.77 - 5.28 10*6/mm3    Hemoglobin 13.5 12.0 - 15.9 g/dL    Hematocrit 42.3 34.0 - 46.6 %    MCV 87.0 79.0 - 97.0 fL    MCH 27.8 26.6 - 33.0 pg    MCHC 31.9 31.5 - 35.7 g/dL    RDW 13.6 12.3 - 15.4 %    RDW-SD 43.3 37.0 - 54.0 fl    MPV 9.4 6.0 - 12.0 fL    Platelets 353 140 - 450 10*3/mm3    Neutrophil % 66.6 42.7 - 76.0 %    Lymphocyte % 23.1 19.6 - 45.3 %    Monocyte % 8.0 5.0 - 12.0 %    Eosinophil % 1.3 0.3 - 6.2 %    Basophil % 0.6 0.0 - 1.5 %    Immature Grans % 0.4 0.0 - 0.5 %    Neutrophils, Absolute 7.01 (H) 1.70 - 7.00 10*3/mm3    Lymphocytes, Absolute 2.43 0.70 - 3.10 10*3/mm3    Monocytes, Absolute 0.84 0.10 - 0.90 10*3/mm3    Eosinophils, Absolute 0.14 0.00 - 0.40 10*3/mm3    Basophils, Absolute 0.06 0.00 - 0.20 10*3/mm3    Immature Grans, Absolute 0.04 0.00 - 0.05 10*3/mm3    nRBC 0.0 0.0 - 0.2 /100 WBC   Green Top (Gel)    Collection Time: 05/27/25  9:17 PM   Result Value Ref Range    Extra Tube Hold for add-ons.    Lavender Top    Collection Time: 05/27/25  9:17 PM   Result Value Ref Range    Extra Tube hold for add-on    Red Top    Collection Time: 05/27/25  9:17 PM   Result Value Ref Range    Extra Tube Hold for add-ons.     Snow Top    Collection Time: 05/27/25  9:17 PM   Result Value Ref Range    Extra Tube Hold for add-ons.    Light Blue Top    Collection Time: 05/27/25  9:17 PM   Result Value Ref Range    Extra Tube Hold for add-ons.    Urinalysis With Culture If Indicated - Urine, Clean Catch    Collection Time: 05/27/25  9:47 PM    Specimen: Urine, Clean Catch   Result Value Ref Range    Color, UA Yellow Yellow, Straw    Appearance, UA Slightly Cloudy (A) Clear    pH, UA <=5.0 5.0 - 8.0    Specific Gravity, UA >=1.030 1.005 - 1.030    Glucose, UA Negative Negative    Ketones, UA Negative Negative    Bilirubin, UA Negative Negative    Blood, UA Large (3+) (A) Negative    Protein, UA Trace (A) Negative    Leuk Esterase, UA Negative Negative    Nitrite, UA Negative Negative    Urobilinogen, UA 0.2 E.U./dL 0.2 - 1.0 E.U./dL   Urinalysis, Microscopic Only - Urine, Clean Catch    Collection Time: 05/27/25  9:47 PM    Specimen: Urine, Clean Catch   Result Value Ref Range    RBC, UA 6-10 (A) None Seen, 0-2 /HPF    WBC, UA 3-5 (A) None Seen, 0-2 /HPF    Bacteria, UA 1+ (A) None Seen /HPF    Squamous Epithelial Cells, UA 0-2 None Seen, 0-2 /HPF    Hyaline Casts, UA None Seen None Seen /LPF    Methodology Manual Light Microscopy      Assessment and Plan    Diagnoses and all orders for this visit:    1. Sensation of pressure in bladder area (Primary)  -     POC Urinalysis Dipstick, Multipro      Patient doing well after passing recent stone but still with dysuria and bladder spasms. Urine culture from ED still in process and urine today is normal. She was started on cefdinir in the ED which I recommend continuing as we await urine culture. Can take pyridium as needed for dysuria and bladder spasms. She will follow up at her next scheduled appointment with pre-clinic ultrasound, or sooner if needed.

## 2025-05-28 NOTE — ED PROVIDER NOTES
Subjective   History of Present Illness  Pt presents to the ED with L flank pain.  Reports that this began around 6pm tonight.  She had some discomfort with urination/urinary hesitancy prior to this.  No f/c.  No injuries.  Pt reports she recently had a kidney stone and this feels the same.        Review of Systems   Constitutional:  Negative for fever.   Respiratory:  Negative for cough.    Cardiovascular:  Negative for chest pain.   Gastrointestinal:  Negative for abdominal pain and vomiting.   Genitourinary:  Positive for difficulty urinating and flank pain. Negative for hematuria.   All other systems reviewed and are negative.      Past Medical History:   Diagnosis Date    Acid reflux     Anxiety     Breast cancer     Disease of thyroid gland     High cholesterol     History of chemotherapy     Hypertension     Kidney stone     Lymphedema of left arm     Panic attacks     PONV (postoperative nausea and vomiting)        Allergies   Allergen Reactions    Sulfa Antibiotics Swelling     TONGUE  Swelling tongue      Azithromycin Rash    Ciprofloxacin Rash    Neomycin-Bacitracin-Polymyxin [Bacitracin-Neomycin-Polymyxin] Rash    Neomycin-Polymyxin-Gramicidin Rash    Neosporin [Neomycin-Bacitracin Zn-Polymyx] Rash    Other Other (See Comments)     CIPROMAX--RASH    Penicillins Rash       Past Surgical History:   Procedure Laterality Date    BONE CYST EXCISION Left 1981    CYST REMOVED FROM LEFT RING FINGER, GRAFT TAKEN FROM RIGHT HIP    BREAST RECONSTRUCTION Bilateral 2010    CHOLECYSTECTOMY  2005    EXTRACORPOREAL SHOCK WAVE LITHOTRIPSY (ESWL) Left 7/31/2017    Procedure: EXTRACORPOREAL SHOCKWAVE LITHOTRIPSY;  Surgeon: Hollis Garcia MD;  Location:  [facility] OR;  Service:     FLAP HEAD/NECK N/A 11/6/2019    Procedure: POSSIBLE FLAP;  Surgeon: Lamine Aguiar MD;  Location:  [facility] OR;  Service: ENT    HEAD/NECK LESION/CYST EXCISION Left 11/6/2019    Procedure: Excision of neoplasm of uncertain behavior of the  left cheek with transposition flap for closure;  Surgeon: Lamine Aguiar MD;  Location:  [facility] OR;  Service: ENT    HYSTERECTOMY  2001    MASTECTOMY Bilateral 2008    OTHER SURGICAL HISTORY  2005    SPHINCTER OF ODDI REPAIR        Family History   Problem Relation Age of Onset    Cancer Father     Kidney cancer Mother        Social History     Socioeconomic History    Marital status:    Tobacco Use    Smoking status: Never     Passive exposure: Never    Smokeless tobacco: Never   Vaping Use    Vaping status: Never Used   Substance and Sexual Activity    Alcohol use: Yes     Comment: occ    Drug use: No    Sexual activity: Defer           Objective   Physical Exam  Vitals and nursing note reviewed.   Constitutional:       General: She is not in acute distress.  Cardiovascular:      Rate and Rhythm: Normal rate and regular rhythm.      Heart sounds: Normal heart sounds.   Pulmonary:      Effort: Pulmonary effort is normal.      Breath sounds: Normal breath sounds.   Abdominal:      General: Abdomen is flat. Bowel sounds are normal.      Palpations: Abdomen is soft.      Tenderness: There is no abdominal tenderness.   Skin:     General: Skin is warm and dry.      Capillary Refill: Capillary refill takes less than 2 seconds.   Neurological:      Mental Status: She is alert.         Procedures           ED Course      Labs Reviewed   URINALYSIS W/ CULTURE IF INDICATED - Abnormal; Notable for the following components:       Result Value    Appearance, UA Slightly Cloudy (*)     Blood, UA Large (3+) (*)     Protein, UA Trace (*)     All other components within normal limits    Narrative:     In absence of clinical symptoms, the presence of pyuria, bacteria, and/or nitrites on the urinalysis result does not correlate with infection.   URINALYSIS, MICROSCOPIC ONLY - Abnormal; Notable for the following components:    RBC, UA 6-10 (*)     WBC, UA 3-5 (*)     Bacteria, UA 1+ (*)     All other components within  normal limits   BASIC METABOLIC PANEL - Abnormal; Notable for the following components:    Glucose 146 (*)     All other components within normal limits    Narrative:     GFR Categories in Chronic Kidney Disease (CKD)              GFR Category          GFR (mL/min/1.73)    Interpretation  G1                    90 or greater        Normal or high (1)  G2                    60-89                Mild decrease (1)  G3a                   45-59                Mild to moderate decrease  G3b                   30-44                Moderate to severe decrease  G4                    15-29                Severe decrease  G5                    14 or less           Kidney failure    (1)In the absence of evidence of kidney disease, neither GFR category G1 or G2 fulfill the criteria for CKD.    eGFR calculation 2021 CKD-EPI creatinine equation, which does not include race as a factor   CBC WITH AUTO DIFFERENTIAL - Abnormal; Notable for the following components:    Neutrophils, Absolute 7.01 (*)     All other components within normal limits   URINE CULTURE   RAINBOW DRAW    Narrative:     The following orders were created for panel order [place] Draw.  Procedure                               Abnormality         Status                     ---------                               -----------         ------                     Green Top (Gel)[[id number]]                                  Final result               Lavender Top[[id number]]                                     Final result               Red Top[[id number]]                                          Final result               Snow Top[[id number]]                                         Final result               Light Blue Top[[id number]]                                   Final result                 Please view results for these tests on the individual orders.   GREEN TOP   LAVENDER TOP   RED TOP   GRAY TOP   LIGHT BLUE TOP   CBC AND DIFFERENTIAL    Narrative:     The following  orders were created for panel order CBC & Differential.  Procedure                               Abnormality         Status                     ---------                               -----------         ------                     CBC Auto Differential[[id number]]        Abnormal            Final result                 Please view results for these tests on the individual orders.     CT Abdomen Pelvis Without Contrast    (Results Pending)                                                        Medical Decision Making  Pt stable in ED - NAD att. NS abdomen.  No evid of SBI/sepsis.  No evid of bowel pathology.  Pt has 4mm L uvj stone.  Has some trace bacteria on UA - will give omnicef for this.  Will d/c to home with flomax/omnicef/ultram/zofran.  Recommend outpt f/u    Amount and/or Complexity of Data Reviewed  Labs: ordered.  Radiology: ordered.    Risk  Prescription drug management.        Final diagnoses:   Left ureteral stone       ED Disposition  ED Disposition       ED Disposition   Discharge    Condition   Stable    Comment   --               Shivam Chew, APRN  [address] [facility] [address] [phone]               Medication List        New Prescriptions      cefdinir 300 MG capsule  Commonly known as: OMNICEF  Take 1 capsule by mouth 2 (Two) Times a Day for 5 days.     traMADol 50 MG tablet  Commonly known as: ULTRAM  Take 1 tablet by mouth Every 6 (Six) Hours As Needed for Moderate Pain.            Changed      * ondansetron ODT 8 MG disintegrating tablet  Commonly known as: ZOFRAN-ODT  What changed: Another medication with the same name was added. Make sure you understand how and when to take each.     * ondansetron ODT 4 MG disintegrating tablet  Commonly known as: ZOFRAN-ODT  Place 1 tablet on the tongue Every 8 (Eight) Hours As Needed for Nausea or Vomiting.  What changed: You were already taking a medication with the same name, and this prescription was added. Make sure you  understand how and when to take each.     * tamsulosin 0.4 MG capsule 24 hr capsule  Commonly known as: FLOMAX  What changed: Another medication with the same name was added. Make sure you understand how and when to take each.     * tamsulosin 0.4 MG capsule 24 hr capsule  Commonly known as: FLOMAX  Take 1 capsule by mouth Daily for 5 days.  What changed: You were already taking a medication with the same name, and this prescription was added. Make sure you understand how and when to take each.           * This list has 4 medication(s) that are the same as other medications prescribed for you. Read the directions carefully, and ask your doctor or other care provider to review them with you.                   Where to Get Your Medications        These medications were sent to [facility] [address] [place] [address] [address] [facility] [phone] [place] [id number] [address] [address]      Phone: [phone]   cefdinir 300 MG capsule  ondansetron ODT 4 MG disintegrating tablet  tamsulosin 0.4 MG capsule 24 hr capsule  traMADol 50 MG tablet            Jerome Ruggiero, DO  05/27/25 2244       Jerome Ruggiero, DO  05/28/25 0011

## 2025-07-08 ENCOUNTER — HOSPITAL ENCOUNTER (OUTPATIENT)
Dept: PHYSICAL THERAPY | Facility: HOSPITAL | Age: 70
Setting detail: THERAPIES SERIES
Discharge: HOME | End: 2025-07-08
Payer: MEDICARE

## 2025-07-08 DIAGNOSIS — M25.512 CHRONIC LEFT SHOULDER PAIN: ICD-10-CM

## 2025-07-08 DIAGNOSIS — G89.29 CHRONIC LEFT SHOULDER PAIN: ICD-10-CM

## 2025-07-08 DIAGNOSIS — M25.532 LEFT WRIST PAIN: ICD-10-CM

## 2025-07-08 DIAGNOSIS — I97.2 POST-MASTECTOMY LYMPHEDEMA SYNDROME: Primary | ICD-10-CM

## 2025-07-08 PROCEDURE — 97535 SELF CARE MNGMENT TRAINING: CPT | Performed by: PHYSICAL THERAPIST

## 2025-07-08 PROCEDURE — 97162 PT EVAL MOD COMPLEX 30 MIN: CPT | Performed by: PHYSICAL THERAPIST

## 2025-07-08 NOTE — THERAPY EVALUATION
Physical Therapy Initial Evaluation and Plan of Care  [address] [place] [address]    Patient: Demi Morataya             : 1955  Today's Date: 2025  Referring practitioner: Denise Harrison MD  Date of Initial Visit: 2025  Patient seen for 25 sessions    Visit Diagnoses:    ICD-10-CM ICD-9-CM   1. Post-mastectomy lymphedema syndrome  I97.2 457.0     Past Medical History:   Diagnosis Date    Acid reflux     Anxiety     Breast cancer     Disease of thyroid gland     High cholesterol     History of chemotherapy     Hypertension     Kidney stone     Lymphedema of left arm     Panic attacks     PONV (postoperative nausea and vomiting)      Past Surgical History:   Procedure Laterality Date    BONE CYST EXCISION Left     CYST REMOVED FROM LEFT RING FINGER, GRAFT TAKEN FROM RIGHT HIP    BREAST RECONSTRUCTION Bilateral     CHOLECYSTECTOMY      EXTRACORPOREAL SHOCK WAVE LITHOTRIPSY (ESWL) Left 2017    Procedure: EXTRACORPOREAL SHOCKWAVE LITHOTRIPSY;  Surgeon: Hollis Garcia MD;  Location:  [facility] OR;  Service:     FLAP HEAD/NECK N/A 2019    Procedure: POSSIBLE FLAP;  Surgeon: Lamine Aguiar MD;  Location: [facility] OR;  Service: ENT    HEAD/NECK LESION/CYST EXCISION Left 2019    Procedure: 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 She has a sleeve but reports it got really tight soon after she got it back in .     Subjective comment: She has been neglectful and her arm has gotten out of  control.     Outcome Measure:   Quick DASH= 50%       OBJECTIVE     Objective      07/08/25 1100   Lymphedema Assessment   Lymphedema Classification LUE:;Trunk:;secondary;stage 2 (Spontaneously Irreversible)   Lymphedema Cancer Related Sx axillary dissection;radical mastectomy;reconstructive;simple mastectomy   Lymphedema Surgery Comments lymphovascular bypasses in LUE February 2021. Original breast cancer surgeries 2011   Chemo Received yes   Radiation Therapy Received yes   Infections or Cellulitis? yes   Infection/Cellulitis Treatment reports 1 bout of cellulitis in the past year.   Lymphedema Edema Assessment   Edema Assessment Comment some pitting in the forearm and hand. Most of upper arm non-pitting.   Skin Changes/Observations   Skin Observations Comment scars visible from LV bypasses that were unsuccessful   Lymphedema Measurements   Measurement Type(s) Circumferential   Quick Girth Areas Upper extremities   LUE Quick Girth (cm)   Axilla 48.2 cm   Mid upper arm 44.8 cm   Elbow 36.5 cm   Mid forearm 35 cm   Wrist crease 22 cm   Web space 23.5 cm   Met-heads 20.6 cm   LUE Quick Girth Total 230.6   RUE Quick Girth (cm)   Axilla 41.5 cm   Mid upper arm 35.5 cm   Elbow 28.5 cm   Mid forearm 23 cm   Wrist crease 16.8 cm   Web space 19 cm   Met-heads 18.8 cm   RUE Quick Girth Total 183.1   Compression/Skin Care   Compression Garment Comments Donned the edema wear- size medium to the L arm     Therapy Education/Self Care 62379   Education offered today Time spent reviewing use of compression, pump and doing self massage. Educated about trying to avoid positioning that allows elbow to stay bent.    Medbride Code    Ongoing HEP   Use the trunk piece of the pump. At least wear the edema wear. Try to keep elbow straighter when at rest.    Timed Minutes 20       Total Timed Treatment:     20   mins  Total Time of Visit:            50   mins    ASSESSMENT/PLAN     Goals                                          Progress  Note due by 8/7/25                                                      Recert due by 10/5/25   STG by: 6 weeks Comments Date Status   Patient will have a good basic understanding of lymphedema and its suggested risk reduction practices      Patient will be independent with remedial HEP for lymphedema      Patient will be independent with self MLD for lymphedema      Quick DASH score will improve by at least 5 points      LTG by: 12 weeks      Patient will have appropriate compression garments for lymphedema maintenance      Patient will have no sign or symptoms of infection      Patient will be independent with comprehensive maintenance program for lymphedema      She will be able to reach across her body with LUE to perform daily hygiene with only minimal difficulty.       Quick DASH score will reduce by at least 10.               Assessment & Plan       Assessment  Assessment details: Mrs. Morataya is a 70-year-old female who returns in need of further lymphedema care for her left postmastectomy lymphedema.  She could never tolerate the custom compression sleeve that was made for her back in 2024.  It had been remade wants and she still felt it was too tight and she could not tolerate it.  She has neglected use of the pump as well.  Her measurements seem to show that the main area that has increased with swelling is distal to the toe.  We discussed trying to keep that arm resting the elbow more straight to allow drainage from the forearm to get past the elbow.  She is also having a lot of discomfort in her shoulder that always hurts.  It has been disrupting her sleep.  The swelling increasing in the forearm and hand has resulted in some sharp wrist pains as well but this seems to be positional.  Her  has recently been diagnosed with early stages of Alzheimer's which she has also having to deal with and adjust around.  She would benefit from a course of CDT to improve her lymphedema symptoms and the pain in  the joints.  We will need to make sure any garments she gets are well-fitting and comfortable.  Barriers to therapy: chronic nature of lymphedema, caregiver for , pain  Prognosis: good    Plan  Therapy options: will be seen for skilled therapy services  Frequency: 1x week  Duration in weeks: 12  Treatment plan discussed with: patient      Anticipated CPT codes: Therapeutic Exercise 89386, Manual Therapy 09945, and Self Care/Home Management 59306    SIGNATURE: Kerline Good, PT, DPT, KIMBERLY-ROBERT FRANCO License #: [id number]  Electronically Signed on 7/8/2025    Initial Certification  Certification Period: 7/8/2025 through 10/5/2025  I certify that the therapy services are furnished while this patient is under my care.  The services outlined above are required by this patient, and will be reviewed every 90 days.     PHYSICIAN: Denise Harrison MD (NPI: [id number])    Signature:_________________________________________DATE: ________      Please sign and return via fax to [phone].   Thank you so much for letting us work with Demi. I appreciate your letting us work with your patients. If you have any questions or concerns, please don't hesitate to contact me.          [address] [address]  [phone]

## 2025-07-22 ENCOUNTER — HOSPITAL ENCOUNTER (OUTPATIENT)
Dept: PHYSICAL THERAPY | Facility: HOSPITAL | Age: 70
Setting detail: THERAPIES SERIES
Discharge: HOME | End: 2025-07-22
Payer: MEDICARE

## 2025-07-22 DIAGNOSIS — M25.532 LEFT WRIST PAIN: ICD-10-CM

## 2025-07-22 DIAGNOSIS — G89.29 CHRONIC LEFT SHOULDER PAIN: ICD-10-CM

## 2025-07-22 DIAGNOSIS — M25.512 CHRONIC LEFT SHOULDER PAIN: ICD-10-CM

## 2025-07-22 DIAGNOSIS — I97.2 POST-MASTECTOMY LYMPHEDEMA SYNDROME: Primary | ICD-10-CM

## 2025-07-22 PROCEDURE — 97140 MANUAL THERAPY 1/> REGIONS: CPT | Performed by: PHYSICAL THERAPIST

## 2025-07-22 NOTE — THERAPY TREATMENT NOTE
Physical Therapy Treatment Note  [facility]  A department [address] [address]    Patient: Demi Morataya                                                 Visit Date: 2025  :     1955    Referring practitioner:    Denise Harrison MD  Date of Initial Visit:          Type: THERAPY  Episode: Worsening LUE lymphedema  Number of visits this episode: 2    Visit Diagnoses:    ICD-10-CM ICD-9-CM   1. Post-mastectomy lymphedema syndrome  I97.2 457.0   2. Chronic left shoulder pain  M25.512 719.41    G89.29 338.29   3. Left wrist pain  M25.532 719.43     SUBJECTIVE     Subjective: Her shoulders are sore, but her arm feels pretty good today. It is amazing how many times a day we bend our arms so she has been much more aware of this.       PAIN: 0/10         OBJECTIVE     Objective    Lymphedema       Row Name 25 1400             Subjective Pain    Able to rate subjective pain? yes  -HR      Pre-Treatment Pain Level 0  -HR         Lymphedema Measurements    Measurement Type(s) Circumferential  -HR      Quick Girth Areas Upper extremities  -HR         LUE Quick Girth (cm)    Axilla 46.6 cm  -HR      Mid upper arm 43.7 cm  -HR      Elbow 37.2 cm  -HR      Mid forearm 35.6 cm  -HR      Wrist crease 22.6 cm  -HR      Web space 24.2 cm  -HR      Met-heads 20.6 cm  -HR      LUE Quick Girth Total 230.5  -HR         Manual Lymphatic Drainage    Manual Lymphatic Drainage initial sequence;opened regional lymph nodes;opened anastamoses;extremity treatment  -HR      Initial Sequence short neck;abdomen  -HR      Abdomen superficial  -HR      Opened Regional Lymph Nodes axillary;inguinal  -HR      Axillary left  -HR      Inguinal right  -HR      Opened Anastamoses anterior axillo-axillary;axillo-inguinal  -HR      Axillo-Inguinal left  -HR      Extremity Treatment MLD to full limb  -HR      MLD to Full Limb LUE  -HR      Manual Lymphatic Drainage  "Comments placed strips of kinesiotape across the skin creases at the wrist and the elbow. 4\" pieces with strong stretch across the creases.  -HR      Manual Therapy 60  -HR         Compression/Skin Care    Compression Garment Comments She brought the old edema wear that is worn out. Is ordering a new one.  -HR                User Key  (r) = Recorded By, (t) = Taken By, (c) = Cosigned By      Initials Name Provider Type    HR Kerline Good, PT, DPT, CLT-SALVADOR Physical Therapist                    Therapy Education/Self Care 64888   Education offered today Use of tape   Medbride Code    Ongoing HEP   Cont using pump and try compression options she has   Timed Minutes        Total Timed Treatment:     60   mins  Total Time of Visit:             60   mins         ASSESSMENT/PLAN     GOALS          Goals                                          Progress Note due by 8/7/25                                                      Recert due by 10/5/25   STG by: 6 weeks Comments Date Status   Patient will have a good basic understanding of lymphedema and its suggested risk reduction practices         Patient will be independent with remedial HEP for lymphedema         Patient will be independent with self MLD for lymphedema         Quick DASH score will improve by at least 5 points         LTG by: 12 weeks         Patient will have appropriate compression garments for lymphedema maintenance         Patient will have no sign or symptoms of infection         Patient will be independent with comprehensive maintenance program for lymphedema         She will be able to reach across her body with LUE to perform daily hygiene with only minimal difficulty.          Quick DASH score will reduce by at least 10.          Anticipated CPT codes: Therapeutic Exercise 97993, Manual Therapy 86486, and Self Care/Home Management 84352      Assessment/Plan     ASSESSMENT:   First visit since eval. She ordered the wrong thing trying to get " a new edema wear sleeve. She has been trying to keep herself from bending her elbow as much when at rest. It has been hard. She is going to try to find her night time sleeve to help.     PLAN:   Cont CDT.    SIGNATURE: Kerline Good, PT, DPT, CLSHIVA-MADI FRANCO License #: [id number]  Electronically Signed on 7/22/2025        [address] [place] [address]. [address]  [phone]

## 2025-08-07 ENCOUNTER — HOSPITAL ENCOUNTER (OUTPATIENT)
Dept: PHYSICAL THERAPY | Facility: HOSPITAL | Age: 70
Setting detail: THERAPIES SERIES
Discharge: HOME | End: 2025-08-07
Payer: MEDICARE

## 2025-08-07 DIAGNOSIS — M25.532 LEFT WRIST PAIN: ICD-10-CM

## 2025-08-07 DIAGNOSIS — G89.29 CHRONIC LEFT SHOULDER PAIN: ICD-10-CM

## 2025-08-07 DIAGNOSIS — I97.2 POST-MASTECTOMY LYMPHEDEMA SYNDROME: Primary | ICD-10-CM

## 2025-08-07 DIAGNOSIS — M25.512 CHRONIC LEFT SHOULDER PAIN: ICD-10-CM

## 2025-08-07 PROCEDURE — 97140 MANUAL THERAPY 1/> REGIONS: CPT

## 2025-08-11 ENCOUNTER — HOSPITAL ENCOUNTER (OUTPATIENT)
Dept: ULTRASOUND IMAGING | Facility: HOSPITAL | Age: 70
Discharge: HOME | End: 2025-08-11
Payer: MEDICARE

## 2025-08-11 DIAGNOSIS — N20.0 KIDNEY STONE: ICD-10-CM

## 2025-08-11 PROCEDURE — 76775 US EXAM ABDO BACK WALL LIM: CPT

## 2025-08-13 ENCOUNTER — OFFICE VISIT (OUTPATIENT)
Dept: UROLOGY | Facility: CLINIC | Age: 70
End: 2025-08-13
Payer: MEDICARE

## 2025-08-13 VITALS — HEIGHT: 64 IN | TEMPERATURE: 97.6 F | WEIGHT: 269 LBS | BODY MASS INDEX: 45.93 KG/M2

## 2025-08-13 DIAGNOSIS — N20.0 KIDNEY STONES: Primary | ICD-10-CM

## 2025-08-13 LAB
BILIRUB BLD-MCNC: NEGATIVE MG/DL
CLARITY, POC: CLEAR
COLOR UR: YELLOW
GLUCOSE UR STRIP-MCNC: NEGATIVE MG/DL
KETONES UR QL: NEGATIVE
LEUKOCYTE EST, POC: ABNORMAL
NITRITE UR-MCNC: NEGATIVE MG/ML
PH UR: 5.5 [PH] (ref 5–8)
PROT UR STRIP-MCNC: NEGATIVE MG/DL
RBC # UR STRIP: ABNORMAL /UL
SP GR UR: 1.03 (ref 1–1.03)
UROBILINOGEN UR QL: ABNORMAL

## 2025-08-14 ENCOUNTER — HOSPITAL ENCOUNTER (OUTPATIENT)
Dept: PHYSICAL THERAPY | Facility: HOSPITAL | Age: 70
Setting detail: THERAPIES SERIES
Discharge: HOME | End: 2025-08-14
Payer: MEDICARE

## 2025-08-14 DIAGNOSIS — G89.29 CHRONIC LEFT SHOULDER PAIN: ICD-10-CM

## 2025-08-14 DIAGNOSIS — M25.532 LEFT WRIST PAIN: ICD-10-CM

## 2025-08-14 DIAGNOSIS — M25.512 CHRONIC LEFT SHOULDER PAIN: ICD-10-CM

## 2025-08-14 DIAGNOSIS — I97.2 POST-MASTECTOMY LYMPHEDEMA SYNDROME: Primary | ICD-10-CM

## 2025-08-14 PROCEDURE — 97140 MANUAL THERAPY 1/> REGIONS: CPT

## 2025-08-18 ENCOUNTER — HOSPITAL ENCOUNTER (OUTPATIENT)
Dept: PHYSICAL THERAPY | Facility: HOSPITAL | Age: 70
Setting detail: THERAPIES SERIES
Discharge: HOME | End: 2025-08-18
Payer: MEDICARE

## 2025-08-18 DIAGNOSIS — G89.29 CHRONIC LEFT SHOULDER PAIN: ICD-10-CM

## 2025-08-18 DIAGNOSIS — I97.2 POST-MASTECTOMY LYMPHEDEMA SYNDROME: Primary | ICD-10-CM

## 2025-08-18 DIAGNOSIS — M25.532 LEFT WRIST PAIN: ICD-10-CM

## 2025-08-18 DIAGNOSIS — M25.512 CHRONIC LEFT SHOULDER PAIN: ICD-10-CM

## 2025-08-18 PROCEDURE — 97140 MANUAL THERAPY 1/> REGIONS: CPT

## 2025-08-27 ENCOUNTER — HOSPITAL ENCOUNTER (OUTPATIENT)
Dept: PHYSICAL THERAPY | Facility: HOSPITAL | Age: 70
Setting detail: THERAPIES SERIES
Discharge: HOME | End: 2025-08-27
Payer: MEDICARE

## 2025-08-27 DIAGNOSIS — M25.532 LEFT WRIST PAIN: ICD-10-CM

## 2025-08-27 DIAGNOSIS — I97.2 POST-MASTECTOMY LYMPHEDEMA SYNDROME: Primary | ICD-10-CM

## 2025-08-27 DIAGNOSIS — M25.512 CHRONIC LEFT SHOULDER PAIN: ICD-10-CM

## 2025-08-27 DIAGNOSIS — G89.29 CHRONIC LEFT SHOULDER PAIN: ICD-10-CM

## 2025-08-27 PROCEDURE — 97140 MANUAL THERAPY 1/> REGIONS: CPT | Performed by: PHYSICAL THERAPIST

## (undated) DEVICE — FORCEPS BX L240CM JAW DIA2.4MM ORNG L CAP W/ NDL DISP RAD

## (undated) DEVICE — FORCEPS BX L240CM DIA2.4MM L NDL RAD JAW 4 133340

## (undated) DEVICE — SUT ETHLN 6/0 P3 18IN 1698G

## (undated) DEVICE — SPNG GZ WOVN 4X4IN 12PLY 10/BX STRL

## (undated) DEVICE — SUT MNCRYL 4/0 P3 18IN UD MCP494G

## (undated) DEVICE — PK TURNOVER RM ADV

## (undated) DEVICE — SUT ETHLN 5/0 P3 18IN 698H

## (undated) DEVICE — PREP SOL POVIDONE/IODINE BT 4OZ

## (undated) DEVICE — ENDO KIT,LOURDES HOSPITAL: Brand: MEDLINE INDUSTRIES, INC.

## (undated) DEVICE — GLV SURG BIOGEL M LTX PF 7 1/2

## (undated) DEVICE — PK ENT HD AND NK 30